# Patient Record
Sex: FEMALE | Race: OTHER | Employment: UNEMPLOYED | URBAN - METROPOLITAN AREA
[De-identification: names, ages, dates, MRNs, and addresses within clinical notes are randomized per-mention and may not be internally consistent; named-entity substitution may affect disease eponyms.]

---

## 2017-09-21 ENCOUNTER — OFFICE VISIT (OUTPATIENT)
Dept: SURGERY | Age: 42
End: 2017-09-21

## 2017-09-21 VITALS — DIASTOLIC BLOOD PRESSURE: 80 MMHG | WEIGHT: 211 LBS | TEMPERATURE: 98.2 F | SYSTOLIC BLOOD PRESSURE: 121 MMHG

## 2017-09-21 DIAGNOSIS — K35.80 ACUTE APPENDICITIS, UNSPECIFIED ACUTE APPENDICITIS TYPE: Primary | ICD-10-CM

## 2017-09-21 PROCEDURE — 99024 POSTOP FOLLOW-UP VISIT: CPT | Performed by: SURGERY

## 2018-07-09 ENCOUNTER — APPOINTMENT (OUTPATIENT)
Dept: CT IMAGING | Age: 43
DRG: 098 | End: 2018-07-09
Payer: COMMERCIAL

## 2018-07-09 ENCOUNTER — HOSPITAL ENCOUNTER (INPATIENT)
Age: 43
LOS: 11 days | Discharge: HOME HEALTH | DRG: 098 | End: 2018-07-21
Attending: EMERGENCY MEDICINE | Admitting: INTERNAL MEDICINE
Payer: COMMERCIAL

## 2018-07-09 DIAGNOSIS — K04.7 DENTAL ABSCESS: Primary | ICD-10-CM

## 2018-07-09 DIAGNOSIS — L02.01 ABSCESS OF CHIN: ICD-10-CM

## 2018-07-09 DIAGNOSIS — R51.9 FACIAL PAIN, ACUTE: ICD-10-CM

## 2018-07-09 DIAGNOSIS — D50.8 OTHER IRON DEFICIENCY ANEMIA: ICD-10-CM

## 2018-07-09 DIAGNOSIS — L03.211 FACIAL CELLULITIS: ICD-10-CM

## 2018-07-09 LAB
ALBUMIN SERPL-MCNC: 3.9 G/DL (ref 3.9–4.9)
ALP BLD-CCNC: 103 U/L (ref 40–130)
ALT SERPL-CCNC: 16 U/L (ref 0–33)
ANION GAP SERPL CALCULATED.3IONS-SCNC: 11 MEQ/L (ref 7–13)
ANISOCYTOSIS: ABNORMAL
AST SERPL-CCNC: 16 U/L (ref 0–35)
BASOPHILS ABSOLUTE: 0 K/UL (ref 0–0.2)
BASOPHILS RELATIVE PERCENT: 0.2 %
BILIRUB SERPL-MCNC: 0.5 MG/DL (ref 0–1.2)
BUN BLDV-MCNC: 13 MG/DL (ref 6–20)
CALCIUM SERPL-MCNC: 8.4 MG/DL (ref 8.6–10.2)
CHLORIDE BLD-SCNC: 102 MEQ/L (ref 98–107)
CO2: 25 MEQ/L (ref 22–29)
CREAT SERPL-MCNC: 0.78 MG/DL (ref 0.5–0.9)
EOSINOPHILS ABSOLUTE: 0 K/UL (ref 0–0.7)
EOSINOPHILS RELATIVE PERCENT: 0.1 %
GFR AFRICAN AMERICAN: >60
GFR NON-AFRICAN AMERICAN: >60
GLOBULIN: 3.5 G/DL (ref 2.3–3.5)
GLUCOSE BLD-MCNC: 102 MG/DL (ref 74–109)
HCT VFR BLD CALC: 26.4 % (ref 37–47)
HEMOGLOBIN: 8.1 G/DL (ref 12–16)
HYPOCHROMIA: ABNORMAL
LACTIC ACID: 0.9 MMOL/L (ref 0.5–2.2)
LYMPHOCYTES ABSOLUTE: 0.3 K/UL (ref 1–4.8)
LYMPHOCYTES RELATIVE PERCENT: 5.6 %
MCH RBC QN AUTO: 18.6 PG (ref 27–31.3)
MCHC RBC AUTO-ENTMCNC: 30.9 % (ref 33–37)
MCV RBC AUTO: 60.3 FL (ref 82–100)
MICROCYTES: ABNORMAL
MONOCYTES ABSOLUTE: 0 K/UL (ref 0.2–0.8)
MONOCYTES RELATIVE PERCENT: 0.8 %
NEUTROPHILS ABSOLUTE: 4.4 K/UL (ref 1.4–6.5)
NEUTROPHILS RELATIVE PERCENT: 93.3 %
PDW BLD-RTO: 17.4 % (ref 11.5–14.5)
PLATELET # BLD: 284 K/UL (ref 130–400)
POTASSIUM SERPL-SCNC: 3.7 MEQ/L (ref 3.5–5.1)
RBC # BLD: 4.38 M/UL (ref 4.2–5.4)
SODIUM BLD-SCNC: 138 MEQ/L (ref 132–144)
TOTAL PROTEIN: 7.4 G/DL (ref 6.4–8.1)
WBC # BLD: 4.7 K/UL (ref 4.8–10.8)

## 2018-07-09 PROCEDURE — 2580000003 HC RX 258: Performed by: EMERGENCY MEDICINE

## 2018-07-09 PROCEDURE — 6360000002 HC RX W HCPCS: Performed by: EMERGENCY MEDICINE

## 2018-07-09 PROCEDURE — 2500000003 HC RX 250 WO HCPCS: Performed by: EMERGENCY MEDICINE

## 2018-07-09 PROCEDURE — 70487 CT MAXILLOFACIAL W/DYE: CPT

## 2018-07-09 PROCEDURE — 80053 COMPREHEN METABOLIC PANEL: CPT

## 2018-07-09 PROCEDURE — G0378 HOSPITAL OBSERVATION PER HR: HCPCS

## 2018-07-09 PROCEDURE — 96376 TX/PRO/DX INJ SAME DRUG ADON: CPT

## 2018-07-09 PROCEDURE — 6360000004 HC RX CONTRAST MEDICATION: Performed by: RADIOLOGY

## 2018-07-09 PROCEDURE — 96375 TX/PRO/DX INJ NEW DRUG ADDON: CPT

## 2018-07-09 PROCEDURE — 6370000000 HC RX 637 (ALT 250 FOR IP): Performed by: EMERGENCY MEDICINE

## 2018-07-09 PROCEDURE — 36415 COLL VENOUS BLD VENIPUNCTURE: CPT

## 2018-07-09 PROCEDURE — 85025 COMPLETE CBC W/AUTO DIFF WBC: CPT

## 2018-07-09 PROCEDURE — 99285 EMERGENCY DEPT VISIT HI MDM: CPT

## 2018-07-09 PROCEDURE — 83605 ASSAY OF LACTIC ACID: CPT

## 2018-07-09 PROCEDURE — 96365 THER/PROPH/DIAG IV INF INIT: CPT

## 2018-07-09 RX ORDER — SODIUM CHLORIDE 0.9 % (FLUSH) 0.9 %
10 SYRINGE (ML) INJECTION PRN
Status: DISCONTINUED | OUTPATIENT
Start: 2018-07-09 | End: 2018-07-21 | Stop reason: HOSPADM

## 2018-07-09 RX ORDER — FENTANYL CITRATE 50 UG/ML
50 INJECTION, SOLUTION INTRAMUSCULAR; INTRAVENOUS ONCE
Status: COMPLETED | OUTPATIENT
Start: 2018-07-09 | End: 2018-07-09

## 2018-07-09 RX ORDER — FENTANYL CITRATE 50 UG/ML
25 INJECTION, SOLUTION INTRAMUSCULAR; INTRAVENOUS ONCE
Status: COMPLETED | OUTPATIENT
Start: 2018-07-09 | End: 2018-07-09

## 2018-07-09 RX ORDER — SODIUM CHLORIDE 0.9 % (FLUSH) 0.9 %
10 SYRINGE (ML) INJECTION EVERY 12 HOURS SCHEDULED
Status: DISCONTINUED | OUTPATIENT
Start: 2018-07-10 | End: 2018-07-21 | Stop reason: HOSPADM

## 2018-07-09 RX ORDER — KETOROLAC TROMETHAMINE 30 MG/ML
30 INJECTION, SOLUTION INTRAMUSCULAR; INTRAVENOUS EVERY 6 HOURS PRN
Status: DISPENSED | OUTPATIENT
Start: 2018-07-09 | End: 2018-07-14

## 2018-07-09 RX ORDER — KETOROLAC TROMETHAMINE 30 MG/ML
30 INJECTION, SOLUTION INTRAMUSCULAR; INTRAVENOUS ONCE
Status: COMPLETED | OUTPATIENT
Start: 2018-07-09 | End: 2018-07-09

## 2018-07-09 RX ORDER — ONDANSETRON 2 MG/ML
4 INJECTION INTRAMUSCULAR; INTRAVENOUS EVERY 6 HOURS PRN
Status: DISCONTINUED | OUTPATIENT
Start: 2018-07-09 | End: 2018-07-21 | Stop reason: HOSPADM

## 2018-07-09 RX ORDER — FERROUS SULFATE 325(65) MG
325 TABLET ORAL ONCE
Status: COMPLETED | OUTPATIENT
Start: 2018-07-09 | End: 2018-07-09

## 2018-07-09 RX ORDER — ONDANSETRON 2 MG/ML
4 INJECTION INTRAMUSCULAR; INTRAVENOUS ONCE
Status: COMPLETED | OUTPATIENT
Start: 2018-07-09 | End: 2018-07-09

## 2018-07-09 RX ORDER — CLINDAMYCIN PHOSPHATE 600 MG/50ML
600 INJECTION INTRAVENOUS EVERY 8 HOURS
Status: DISCONTINUED | OUTPATIENT
Start: 2018-07-10 | End: 2018-07-14

## 2018-07-09 RX ORDER — CLINDAMYCIN PHOSPHATE 600 MG/50ML
600 INJECTION INTRAVENOUS ONCE
Status: COMPLETED | OUTPATIENT
Start: 2018-07-09 | End: 2018-07-09

## 2018-07-09 RX ORDER — 0.9 % SODIUM CHLORIDE 0.9 %
1000 INTRAVENOUS SOLUTION INTRAVENOUS ONCE
Status: COMPLETED | OUTPATIENT
Start: 2018-07-09 | End: 2018-07-09

## 2018-07-09 RX ADMIN — FERROUS SULFATE TAB 325 MG (65 MG ELEMENTAL FE) 325 MG: 325 (65 FE) TAB at 20:35

## 2018-07-09 RX ADMIN — IOPAMIDOL 100 ML: 755 INJECTION, SOLUTION INTRAVENOUS at 20:51

## 2018-07-09 RX ADMIN — ONDANSETRON 4 MG: 2 INJECTION, SOLUTION INTRAMUSCULAR; INTRAVENOUS at 16:11

## 2018-07-09 RX ADMIN — ONDANSETRON 4 MG: 2 INJECTION, SOLUTION INTRAMUSCULAR; INTRAVENOUS at 19:55

## 2018-07-09 RX ADMIN — SODIUM CHLORIDE 1000 ML: 9 INJECTION, SOLUTION INTRAVENOUS at 16:11

## 2018-07-09 RX ADMIN — HYDROMORPHONE HYDROCHLORIDE 1 MG: 1 INJECTION, SOLUTION INTRAMUSCULAR; INTRAVENOUS; SUBCUTANEOUS at 19:55

## 2018-07-09 RX ADMIN — FENTANYL CITRATE 50 MCG: 50 INJECTION, SOLUTION INTRAMUSCULAR; INTRAVENOUS at 18:20

## 2018-07-09 RX ADMIN — CLINDAMYCIN PHOSPHATE 600 MG: 12 INJECTION, SOLUTION INTRAVENOUS at 16:52

## 2018-07-09 RX ADMIN — KETOROLAC TROMETHAMINE 30 MG: 30 INJECTION, SOLUTION INTRAMUSCULAR at 16:11

## 2018-07-09 RX ADMIN — SODIUM CHLORIDE 1000 ML: 9 INJECTION, SOLUTION INTRAVENOUS at 17:14

## 2018-07-09 RX ADMIN — FENTANYL CITRATE 25 MCG: 50 INJECTION, SOLUTION INTRAMUSCULAR; INTRAVENOUS at 16:11

## 2018-07-09 ASSESSMENT — PAIN DESCRIPTION - FREQUENCY
FREQUENCY: CONTINUOUS

## 2018-07-09 ASSESSMENT — PAIN DESCRIPTION - ORIENTATION
ORIENTATION: LEFT

## 2018-07-09 ASSESSMENT — PAIN DESCRIPTION - LOCATION
LOCATION: TEETH
LOCATION: TEETH

## 2018-07-09 ASSESSMENT — PAIN DESCRIPTION - PAIN TYPE
TYPE: ACUTE PAIN

## 2018-07-09 ASSESSMENT — PAIN SCALES - GENERAL
PAINLEVEL_OUTOF10: 10
PAINLEVEL_OUTOF10: 7
PAINLEVEL_OUTOF10: 10
PAINLEVEL_OUTOF10: 10

## 2018-07-09 ASSESSMENT — PAIN DESCRIPTION - PROGRESSION
CLINICAL_PROGRESSION: NOT CHANGED

## 2018-07-09 ASSESSMENT — PAIN DESCRIPTION - DESCRIPTORS
DESCRIPTORS: THROBBING
DESCRIPTORS: ACHING;THROBBING
DESCRIPTORS: THROBBING
DESCRIPTORS: THROBBING

## 2018-07-09 ASSESSMENT — PAIN DESCRIPTION - ONSET
ONSET: ON-GOING

## 2018-07-09 ASSESSMENT — ENCOUNTER SYMPTOMS
ABDOMINAL PAIN: 0
VOMITING: 1
EYE PAIN: 0
FACIAL SWELLING: 1
SHORTNESS OF BREATH: 0
NAUSEA: 1
CHEST TIGHTNESS: 0
SORE THROAT: 0

## 2018-07-09 ASSESSMENT — PAIN SCALES - WONG BAKER
WONGBAKER_NUMERICALRESPONSE: 2
WONGBAKER_NUMERICALRESPONSE: 2

## 2018-07-09 NOTE — ED PROVIDER NOTES
past medical history. SURGICAL HISTORY     History reviewed. No pertinent surgical history. CURRENT MEDICATIONS       There are no discharge medications for this patient. ALLERGIES     Patient has no known allergies. FAMILY HISTORY     History reviewed. No pertinent family history. SOCIAL HISTORY       Social History     Social History    Marital status: Single     Spouse name: N/A    Number of children: N/A    Years of education: N/A     Social History Main Topics    Smoking status: Never Smoker    Smokeless tobacco: Never Used    Alcohol use No    Drug use: No    Sexual activity: Not Asked     Other Topics Concern    None     Social History Narrative    None       SCREENINGS             PHYSICAL EXAM    (up to 7 for level 4, 8 or more for level 5)     ED Triage Vitals [07/09/18 1529]   BP Temp Temp Source Pulse Resp SpO2 Height Weight   126/89 100.4 °F (38 °C) Oral 131 22 97 % 5' 3\" (1.6 m) 194 lb (88 kg)       Physical Exam   Constitutional: She is oriented to person, place, and time. She appears well-developed and well-nourished. She appears distressed. HENT:   Head: Normocephalic and atraumatic. Right Ear: External ear normal.   Left Ear: External ear normal.   Mouth/Throat: No oropharyngeal exudate. Left lower alveolar ridge is swollen. Tooth 30 is tender to percussion and palpation    Sublingual tissue is not swollen. Uvula is midline. Patient is not drooling. Eyes: Conjunctivae are normal. Pupils are equal, round, and reactive to light. Neck: Normal range of motion. Neck supple. No JVD present. No tracheal deviation present. No thyromegaly present. Cardiovascular: Normal rate and normal heart sounds. No murmur heard. Pulmonary/Chest: Effort normal and breath sounds normal. No respiratory distress. She has no wheezes. Abdominal: Soft. Bowel sounds are normal. There is no tenderness. There is no guarding. Musculoskeletal: Normal range of motion.  She PLASMA       All other labs were within normal range or not returned as of this dictation. EMERGENCY DEPARTMENT COURSE and DIFFERENTIAL DIAGNOSIS/MDM:   Vitals:    Vitals:    07/09/18 1605 07/09/18 1820 07/09/18 1926 07/09/18 2032   BP: 129/81 115/71 115/74    Pulse: 128 128 125 122   Resp: 20 20 20 15   Temp:   99.8 °F (37.7 °C)    TempSrc:   Oral    SpO2: 97% 97% 98% 95%   Weight:       Height:               MDM patient with persistent tachycardia rate approximately 125 despite fluid bolus. Temperature was improved. Pain was still an issue but did not seem to be a level of causing her elevated heart rate. She also has chronic anemia. Patient is to be taking iron and she has not been on that for a while. My recommendation is to have her admitted for continued IV fluids, IV antibiotics and follow her vital signs still improvement. She also needs her iron deficiency anemia addressed      REASSESSMENT              CONSULTS:  None    PROCEDURES:  Unless otherwise noted below, none     Procedures    FINAL IMPRESSION      1. Dental abscess    2. Other iron deficiency anemia          DISPOSITION/PLAN   DISPOSITION Decision To Admit 07/09/2018 10:39:58 PM      PATIENT REFERRED TO:  Bernard Cosme, [address]  [id number]  [place] [id number] [phone]            DISCHARGE MEDICATIONS:  There are no discharge medications for this patient.          (Please note that portions of this note were completed with a voice recognition program.  Efforts were made to edit the dictations but occasionally words are mis-transcribed.)    Dago Lam MD (electronically signed)  Attending Emergency Physician          Dago Lam MD  07/09/18 8610       Dago Lam MD  07/09/18 5378

## 2018-07-09 NOTE — Clinical Note
Patient Class: Observation [104]   REQUIRED: Diagnosis: Dental abscess [740395]   Estimated Length of Stay: Estimated stay of less than 2 midnights   Future Attending Provider: Kenia Cee [[id number]]   Admitting Provider: Kenia Cee [[id number]]   Telemetry Bed Required?: Yes

## 2018-07-10 LAB
ABO/RH: NORMAL
ANION GAP SERPL CALCULATED.3IONS-SCNC: 10 MEQ/L (ref 7–13)
ANISOCYTOSIS: ABNORMAL
ANTIBODY SCREEN: NORMAL
BASOPHILS ABSOLUTE: 0 K/UL (ref 0–0.2)
BASOPHILS RELATIVE PERCENT: 0.4 %
BLOOD BANK DISPENSE STATUS: NORMAL
BLOOD BANK DISPENSE STATUS: NORMAL
BLOOD BANK PRODUCT CODE: NORMAL
BLOOD BANK PRODUCT CODE: NORMAL
BPU ID: NORMAL
BPU ID: NORMAL
BUN BLDV-MCNC: 9 MG/DL (ref 6–20)
C-REACTIVE PROTEIN, HIGH SENSITIVITY: 146.1 MG/L (ref 0–5)
CALCIUM SERPL-MCNC: 7.9 MG/DL (ref 8.6–10.2)
CHLORIDE BLD-SCNC: 104 MEQ/L (ref 98–107)
CO2: 25 MEQ/L (ref 22–29)
CREAT SERPL-MCNC: 0.63 MG/DL (ref 0.5–0.9)
DESCRIPTION BLOOD BANK: NORMAL
DESCRIPTION BLOOD BANK: NORMAL
EOSINOPHILS ABSOLUTE: 0 K/UL (ref 0–0.7)
EOSINOPHILS RELATIVE PERCENT: 0.1 %
GFR AFRICAN AMERICAN: >60
GFR NON-AFRICAN AMERICAN: >60
GLUCOSE BLD-MCNC: 104 MG/DL (ref 74–109)
HCT VFR BLD CALC: 22.4 % (ref 37–47)
HEMOGLOBIN: 6.6 G/DL (ref 12–16)
HYPOCHROMIA: ABNORMAL
LYMPHOCYTES ABSOLUTE: 0.2 K/UL (ref 1–4.8)
LYMPHOCYTES RELATIVE PERCENT: 1.9 %
MCH RBC QN AUTO: 18 PG (ref 27–31.3)
MCHC RBC AUTO-ENTMCNC: 29.6 % (ref 33–37)
MCV RBC AUTO: 61 FL (ref 82–100)
MICROCYTES: ABNORMAL
MONOCYTES ABSOLUTE: 0.7 K/UL (ref 0.2–0.8)
MONOCYTES RELATIVE PERCENT: 5.2 %
NEUTROPHILS ABSOLUTE: 11.5 K/UL (ref 1.4–6.5)
NEUTROPHILS RELATIVE PERCENT: 92.4 %
PDW BLD-RTO: 17.4 % (ref 11.5–14.5)
PLATELET # BLD: 223 K/UL (ref 130–400)
POTASSIUM REFLEX MAGNESIUM: 3.6 MEQ/L (ref 3.5–5.1)
RBC # BLD: 3.68 M/UL (ref 4.2–5.4)
SODIUM BLD-SCNC: 139 MEQ/L (ref 132–144)
WBC # BLD: 12.5 K/UL (ref 4.8–10.8)

## 2018-07-10 PROCEDURE — 86850 RBC ANTIBODY SCREEN: CPT

## 2018-07-10 PROCEDURE — 6360000002 HC RX W HCPCS: Performed by: NURSE PRACTITIONER

## 2018-07-10 PROCEDURE — 6360000002 HC RX W HCPCS: Performed by: INTERNAL MEDICINE

## 2018-07-10 PROCEDURE — 2500000003 HC RX 250 WO HCPCS: Performed by: NURSE PRACTITIONER

## 2018-07-10 PROCEDURE — 2580000003 HC RX 258: Performed by: INTERNAL MEDICINE

## 2018-07-10 PROCEDURE — 2580000003 HC RX 258: Performed by: NURSE PRACTITIONER

## 2018-07-10 PROCEDURE — 6370000000 HC RX 637 (ALT 250 FOR IP): Performed by: PHYSICIAN ASSISTANT

## 2018-07-10 PROCEDURE — 99254 IP/OBS CNSLTJ NEW/EST MOD 60: CPT | Performed by: INTERNAL MEDICINE

## 2018-07-10 PROCEDURE — P9016 RBC LEUKOCYTES REDUCED: HCPCS

## 2018-07-10 PROCEDURE — 96376 TX/PRO/DX INJ SAME DRUG ADON: CPT

## 2018-07-10 PROCEDURE — 87040 BLOOD CULTURE FOR BACTERIA: CPT

## 2018-07-10 PROCEDURE — 85025 COMPLETE CBC W/AUTO DIFF WBC: CPT

## 2018-07-10 PROCEDURE — 80048 BASIC METABOLIC PNL TOTAL CA: CPT

## 2018-07-10 PROCEDURE — 86900 BLOOD TYPING SEROLOGIC ABO: CPT

## 2018-07-10 PROCEDURE — 36415 COLL VENOUS BLD VENIPUNCTURE: CPT

## 2018-07-10 PROCEDURE — 86920 COMPATIBILITY TEST SPIN: CPT

## 2018-07-10 PROCEDURE — 96375 TX/PRO/DX INJ NEW DRUG ADDON: CPT

## 2018-07-10 PROCEDURE — 84145 PROCALCITONIN (PCT): CPT

## 2018-07-10 PROCEDURE — 1210000000 HC MED SURG R&B

## 2018-07-10 PROCEDURE — 86901 BLOOD TYPING SEROLOGIC RH(D): CPT

## 2018-07-10 PROCEDURE — 86141 C-REACTIVE PROTEIN HS: CPT

## 2018-07-10 RX ORDER — ACETAMINOPHEN 325 MG/1
650 TABLET ORAL EVERY 4 HOURS PRN
Status: DISCONTINUED | OUTPATIENT
Start: 2018-07-10 | End: 2018-07-21 | Stop reason: HOSPADM

## 2018-07-10 RX ORDER — MORPHINE SULFATE 2 MG/ML
2 INJECTION, SOLUTION INTRAMUSCULAR; INTRAVENOUS EVERY 4 HOURS PRN
Status: DISCONTINUED | OUTPATIENT
Start: 2018-07-10 | End: 2018-07-11

## 2018-07-10 RX ORDER — 0.9 % SODIUM CHLORIDE 0.9 %
250 INTRAVENOUS SOLUTION INTRAVENOUS ONCE
Status: COMPLETED | OUTPATIENT
Start: 2018-07-10 | End: 2018-07-11

## 2018-07-10 RX ORDER — SODIUM CHLORIDE 9 MG/ML
INJECTION, SOLUTION INTRAVENOUS CONTINUOUS
Status: DISCONTINUED | OUTPATIENT
Start: 2018-07-10 | End: 2018-07-12

## 2018-07-10 RX ORDER — RANITIDINE 150 MG/1
150 TABLET ORAL DAILY
COMMUNITY
End: 2022-04-14

## 2018-07-10 RX ADMIN — Medication 10 ML: at 08:24

## 2018-07-10 RX ADMIN — KETOROLAC TROMETHAMINE 30 MG: 30 INJECTION, SOLUTION INTRAMUSCULAR; INTRAVENOUS at 16:00

## 2018-07-10 RX ADMIN — Medication 10 ML: at 21:25

## 2018-07-10 RX ADMIN — ONDANSETRON HYDROCHLORIDE 4 MG: 2 INJECTION, SOLUTION INTRAMUSCULAR; INTRAVENOUS at 12:18

## 2018-07-10 RX ADMIN — CLINDAMYCIN IN 5 PERCENT DEXTROSE 600 MG: 12 INJECTION, SOLUTION INTRAVENOUS at 08:23

## 2018-07-10 RX ADMIN — SODIUM CHLORIDE: 9 INJECTION, SOLUTION INTRAVENOUS at 12:14

## 2018-07-10 RX ADMIN — SODIUM CHLORIDE: 9 INJECTION, SOLUTION INTRAVENOUS at 22:03

## 2018-07-10 RX ADMIN — KETOROLAC TROMETHAMINE 30 MG: 30 INJECTION, SOLUTION INTRAMUSCULAR; INTRAVENOUS at 23:53

## 2018-07-10 RX ADMIN — ONDANSETRON HYDROCHLORIDE 4 MG: 2 INJECTION, SOLUTION INTRAMUSCULAR; INTRAVENOUS at 04:49

## 2018-07-10 RX ADMIN — ONDANSETRON HYDROCHLORIDE 4 MG: 2 INJECTION, SOLUTION INTRAMUSCULAR; INTRAVENOUS at 20:06

## 2018-07-10 RX ADMIN — SODIUM CHLORIDE 250 ML: 9 INJECTION, SOLUTION INTRAVENOUS at 16:01

## 2018-07-10 RX ADMIN — MORPHINE SULFATE 2 MG: 2 INJECTION, SOLUTION INTRAMUSCULAR; INTRAVENOUS at 21:24

## 2018-07-10 RX ADMIN — CLINDAMYCIN IN 5 PERCENT DEXTROSE 600 MG: 12 INJECTION, SOLUTION INTRAVENOUS at 17:18

## 2018-07-10 RX ADMIN — ACETAMINOPHEN 650 MG: 325 TABLET ORAL at 16:13

## 2018-07-10 RX ADMIN — CLINDAMYCIN IN 5 PERCENT DEXTROSE 600 MG: 12 INJECTION, SOLUTION INTRAVENOUS at 00:24

## 2018-07-10 RX ADMIN — KETOROLAC TROMETHAMINE 30 MG: 30 INJECTION, SOLUTION INTRAMUSCULAR; INTRAVENOUS at 07:25

## 2018-07-10 RX ADMIN — KETOROLAC TROMETHAMINE 30 MG: 30 INJECTION, SOLUTION INTRAMUSCULAR; INTRAVENOUS at 00:23

## 2018-07-10 RX ADMIN — MORPHINE SULFATE 2 MG: 2 INJECTION, SOLUTION INTRAMUSCULAR; INTRAVENOUS at 12:14

## 2018-07-10 RX ADMIN — MORPHINE SULFATE 2 MG: 2 INJECTION, SOLUTION INTRAMUSCULAR; INTRAVENOUS at 17:18

## 2018-07-10 ASSESSMENT — PAIN DESCRIPTION - PAIN TYPE
TYPE: ACUTE PAIN
TYPE: ACUTE PAIN

## 2018-07-10 ASSESSMENT — PAIN SCALES - GENERAL
PAINLEVEL_OUTOF10: 9
PAINLEVEL_OUTOF10: 10
PAINLEVEL_OUTOF10: 8
PAINLEVEL_OUTOF10: 9
PAINLEVEL_OUTOF10: 10
PAINLEVEL_OUTOF10: 7

## 2018-07-10 ASSESSMENT — PAIN DESCRIPTION - FREQUENCY
FREQUENCY: CONTINUOUS
FREQUENCY: CONTINUOUS

## 2018-07-10 ASSESSMENT — PAIN DESCRIPTION - ONSET
ONSET: ON-GOING
ONSET: ON-GOING

## 2018-07-10 ASSESSMENT — PAIN DESCRIPTION - ORIENTATION
ORIENTATION: LEFT
ORIENTATION: LEFT

## 2018-07-10 ASSESSMENT — PAIN DESCRIPTION - LOCATION
LOCATION: TEETH;FACE
LOCATION: TEETH;FACE

## 2018-07-10 NOTE — H&P
molar with swelling in jaw  Neck: Supple, with full range of motion. No jugular venous distention. Trachea midline. Respiratory:  Normal respiratory effort. Clear to auscultation, bilaterally without Rales/Wheezes/Rhonchi. Cardiovascular:  Regular rate and rhythm with normal S1/S2 without murmurs, rubs or gallops. Abdomen: Soft, non-tender, non-distended with normal bowel sounds. Musculoskeletal:  No clubbing, cyanosis or edema bilaterally. Full range of motion without deformity. Skin: Skin color, texture, turgor normal.  No rashes or lesions. Neurologic:  Neurovascularly intact without any focal sensory/motor deficits. Cranial nerves: II-XII intact, grossly non-focal.  Psychiatric:  Alert and oriented, thought content appropriate, normal insight  Capillary Refill: Brisk,< 3 seconds   Peripheral Pulses: +2 palpable, equal bilaterally       Labs:     Recent Labs      07/09/18   1600   WBC  4.7*   HGB  8.1*   HCT  26.4*   PLT  284     Recent Labs      07/09/18   1600   NA  138   K  3.7   CL  102   CO2  25   BUN  13   CREATININE  0.78   CALCIUM  8.4*     Recent Labs      07/09/18   1600   AST  16   ALT  16   BILITOT  0.5   ALKPHOS  103     No results for input(s): INR in the last 72 hours. No results for input(s): Dell Meager in the last 72 hours.     Urinalysis:    No results found for: Lena Rosado, BACTERIA, [address] [facility], [facility], [facility] 994    Radiology:     CXR: I have reviewed the CXR with the following interpretation:   EKG:  I have reviewed the EKG with the following interpretation:     CT MAXILLOFACIAL W CONTRAST    (Results Pending)       ASSESSMENT/PLAN:  Dental Abscess/fever : continue IV clindamycin and fluid, encouraged to follow up outpt with dentist, pain control , trend temp and WBC   Anemia: chronic, will monitor cbc     DVT Prophylaxis:Lovenox  Diet:  dental soft   Code Status: full       Dispo - observation        Ema Landaverde, APRN - CNP    Thank you Merlene Bentley MD for the

## 2018-07-10 NOTE — PROGRESS NOTES
Dr. Hawa Cooper ENT called and stated that since abscess is from dental origin oral surgeon would need to be consulted if primary wants to do so.   Electronically signed by Leeroy Contreras RN on 7/10/2018 at 3:24 PM

## 2018-07-10 NOTE — PROGRESS NOTES
Hospitalist Progress Note      PCP: Valerie Faulkner MD    Date of Admission: 7/9/2018    Chief Complaint:  Patient seen and examined, still having severe dental and jaw pain    Medications:  Reviewed    Infusion Medications    sodium chloride       Scheduled Medications    sodium chloride flush  10 mL Intravenous 2 times per day    enoxaparin  40 mg Subcutaneous Daily    clindamycin (CLEOCIN) IV  600 mg Intravenous Q8H     PRN Meds: morphine, sodium chloride flush, magnesium hydroxide, ondansetron, ketorolac    No intake or output data in the 24 hours ending 07/10/18 1113    Exam:    /75   Pulse 108   Temp 97.7 °F (36.5 °C) (Oral)   Resp 18   Ht 5' 3\" (1.6 m)   Wt 194 lb 14.2 oz (88.4 kg)   LMP 07/05/2018   SpO2 98%   BMI 34.52 kg/m²     General appearance: No apparent distress, appears stated age and cooperative. HEENT: has swelling of the left mandible. Neck: Supple, with full range of motion. No jugular venous distention. Trachea midline. Respiratory:  Normal respiratory effort. Clear to auscultation, bilaterally without Rales/Wheezes/Rhonchi. Cardiovascular: Regular rate and rhythm with normal S1/S2 without murmurs, rubs or gallops. Abdomen: Soft, non-tender, non-distended with normal bowel sounds. Musculoskeletal: No clubbing, cyanosis or edema bilaterally. Full range of motion without deformity. Skin: Skin color, texture, turgor normal.  No rashes or lesions. Neuro: Non Focal. Symetrical motor and tone. Nl Comprehension, Alert,awake and oriented. NL CN. Symetrical tone and reflexes.   Psychiatric: Alert and oriented, thought content appropriate, normal insight  Capillary Refill: Brisk,< 3 seconds   Peripheral Pulses: +2 palpable, equal bilaterally       Labs:   Recent Labs      07/09/18   1600  07/10/18   0541   WBC  4.7*  12.5*   HGB  8.1*  6.6*   HCT  26.4*  22.4*   PLT  284  223     Recent Labs      07/09/18   1600  07/10/18   0541   NA  138  139   K  3.7  3.6   CL  102  104

## 2018-07-10 NOTE — ED NOTES
Report called to [facility] charge RN [phone] [place]  07/09/18 6040

## 2018-07-10 NOTE — CARE COORDINATION
MET WITH PATIENT TO DISCUSS D/C PLANS. SHE IS FROM HOME WITH HER CHILDREN PRESENTLY. SHE PLANS TO RETURN HOME. PATIENT WITH LEFT SIDE OF FACE NOTED CONSIDERABLY SWOLLEN. ORAL SURGEON TO SEE HER. SHE IS RECEIVING BLOOD CURRENTLY. CARE TEAM TO FOLLOW FOR ? IV ANTIBIOTIC NEEDS.  Electronically signed by Rodrigo Mitchell RN on 7/10/2018 at 4:27 PM

## 2018-07-10 NOTE — PROGRESS NOTES
Rakesh Camilo served Dr Alice Sinclair as Pt states she is on her period. Just got back a critical hgb of 6.6.  yesterday it was 8.1.

## 2018-07-11 LAB
ANION GAP SERPL CALCULATED.3IONS-SCNC: 12 MEQ/L (ref 7–13)
BASOPHILS ABSOLUTE: 0.1 K/UL (ref 0–0.2)
BASOPHILS RELATIVE PERCENT: 0.6 %
BUN BLDV-MCNC: 8 MG/DL (ref 6–20)
CALCIUM SERPL-MCNC: 8 MG/DL (ref 8.6–10.2)
CHLORIDE BLD-SCNC: 104 MEQ/L (ref 98–107)
CO2: 23 MEQ/L (ref 22–29)
CREAT SERPL-MCNC: 0.6 MG/DL (ref 0.5–0.9)
EOSINOPHILS ABSOLUTE: 0.1 K/UL (ref 0–0.7)
EOSINOPHILS RELATIVE PERCENT: 0.6 %
GFR AFRICAN AMERICAN: >60
GFR NON-AFRICAN AMERICAN: >60
GLUCOSE BLD-MCNC: 91 MG/DL (ref 74–109)
HCT VFR BLD CALC: 26.3 % (ref 37–47)
HEMOGLOBIN: 8.3 G/DL (ref 12–16)
LYMPHOCYTES ABSOLUTE: 0.6 K/UL (ref 1–4.8)
LYMPHOCYTES RELATIVE PERCENT: 8 %
MCH RBC QN AUTO: 20.9 PG (ref 27–31.3)
MCHC RBC AUTO-ENTMCNC: 31.5 % (ref 33–37)
MCV RBC AUTO: 66.3 FL (ref 82–100)
MONOCYTES ABSOLUTE: 0.6 K/UL (ref 0.2–0.8)
MONOCYTES RELATIVE PERCENT: 8.1 %
NEUTROPHILS ABSOLUTE: 6.7 K/UL (ref 1.4–6.5)
NEUTROPHILS RELATIVE PERCENT: 82.7 %
PDW BLD-RTO: 24 % (ref 11.5–14.5)
PLATELET # BLD: 184 K/UL (ref 130–400)
POTASSIUM SERPL-SCNC: 3.3 MEQ/L (ref 3.5–5.1)
RBC # BLD: 3.97 M/UL (ref 4.2–5.4)
SODIUM BLD-SCNC: 139 MEQ/L (ref 132–144)
WBC # BLD: 8.1 K/UL (ref 4.8–10.8)

## 2018-07-11 PROCEDURE — 99232 SBSQ HOSP IP/OBS MODERATE 35: CPT | Performed by: INTERNAL MEDICINE

## 2018-07-11 PROCEDURE — 6360000002 HC RX W HCPCS: Performed by: INTERNAL MEDICINE

## 2018-07-11 PROCEDURE — 6370000000 HC RX 637 (ALT 250 FOR IP): Performed by: INTERNAL MEDICINE

## 2018-07-11 PROCEDURE — 6360000002 HC RX W HCPCS: Performed by: NURSE PRACTITIONER

## 2018-07-11 PROCEDURE — 36415 COLL VENOUS BLD VENIPUNCTURE: CPT

## 2018-07-11 PROCEDURE — 2580000003 HC RX 258: Performed by: INTERNAL MEDICINE

## 2018-07-11 PROCEDURE — 80048 BASIC METABOLIC PNL TOTAL CA: CPT

## 2018-07-11 PROCEDURE — 2500000003 HC RX 250 WO HCPCS: Performed by: NURSE PRACTITIONER

## 2018-07-11 PROCEDURE — 1210000000 HC MED SURG R&B

## 2018-07-11 PROCEDURE — 85025 COMPLETE CBC W/AUTO DIFF WBC: CPT

## 2018-07-11 RX ORDER — MORPHINE SULFATE 2 MG/ML
2 INJECTION, SOLUTION INTRAMUSCULAR; INTRAVENOUS EVERY 4 HOURS PRN
Status: DISPENSED | OUTPATIENT
Start: 2018-07-11 | End: 2018-07-12

## 2018-07-11 RX ORDER — VANCOMYCIN HYDROCHLORIDE 1 G/200ML
1000 INJECTION, SOLUTION INTRAVENOUS EVERY 12 HOURS
Status: DISCONTINUED | OUTPATIENT
Start: 2018-07-11 | End: 2018-07-17

## 2018-07-11 RX ORDER — OXYCODONE HYDROCHLORIDE AND ACETAMINOPHEN 5; 325 MG/1; MG/1
1 TABLET ORAL EVERY 4 HOURS PRN
Status: DISCONTINUED | OUTPATIENT
Start: 2018-07-11 | End: 2018-07-15

## 2018-07-11 RX ORDER — POTASSIUM CHLORIDE 1.5 G/1.77G
30 POWDER, FOR SOLUTION ORAL ONCE
Status: COMPLETED | OUTPATIENT
Start: 2018-07-11 | End: 2018-07-11

## 2018-07-11 RX ORDER — POTASSIUM CHLORIDE 20 MEQ/1
40 TABLET, EXTENDED RELEASE ORAL ONCE
Status: COMPLETED | OUTPATIENT
Start: 2018-07-11 | End: 2018-07-11

## 2018-07-11 RX ADMIN — VANCOMYCIN HYDROCHLORIDE 1000 MG: 1 INJECTION, SOLUTION INTRAVENOUS at 14:34

## 2018-07-11 RX ADMIN — KETOROLAC TROMETHAMINE 30 MG: 30 INJECTION, SOLUTION INTRAMUSCULAR; INTRAVENOUS at 22:48

## 2018-07-11 RX ADMIN — CLINDAMYCIN IN 5 PERCENT DEXTROSE 600 MG: 12 INJECTION, SOLUTION INTRAVENOUS at 09:07

## 2018-07-11 RX ADMIN — ONDANSETRON HYDROCHLORIDE 4 MG: 2 INJECTION, SOLUTION INTRAMUSCULAR; INTRAVENOUS at 21:20

## 2018-07-11 RX ADMIN — MORPHINE SULFATE 2 MG: 2 INJECTION, SOLUTION INTRAMUSCULAR; INTRAVENOUS at 09:07

## 2018-07-11 RX ADMIN — CLINDAMYCIN IN 5 PERCENT DEXTROSE 600 MG: 12 INJECTION, SOLUTION INTRAVENOUS at 16:53

## 2018-07-11 RX ADMIN — VANCOMYCIN HYDROCHLORIDE 1000 MG: 1 INJECTION, SOLUTION INTRAVENOUS at 01:48

## 2018-07-11 RX ADMIN — KETOROLAC TROMETHAMINE 30 MG: 30 INJECTION, SOLUTION INTRAMUSCULAR; INTRAVENOUS at 10:59

## 2018-07-11 RX ADMIN — ONDANSETRON HYDROCHLORIDE 4 MG: 2 INJECTION, SOLUTION INTRAMUSCULAR; INTRAVENOUS at 04:04

## 2018-07-11 RX ADMIN — OXYCODONE HYDROCHLORIDE AND ACETAMINOPHEN 1 TABLET: 5; 325 TABLET ORAL at 20:03

## 2018-07-11 RX ADMIN — SODIUM CHLORIDE: 9 INJECTION, SOLUTION INTRAVENOUS at 21:19

## 2018-07-11 RX ADMIN — MORPHINE SULFATE 2 MG: 2 INJECTION, SOLUTION INTRAMUSCULAR; INTRAVENOUS at 04:04

## 2018-07-11 RX ADMIN — OXYCODONE HYDROCHLORIDE AND ACETAMINOPHEN 1 TABLET: 5; 325 TABLET ORAL at 10:59

## 2018-07-11 RX ADMIN — MORPHINE SULFATE 2 MG: 2 INJECTION, SOLUTION INTRAMUSCULAR; INTRAVENOUS at 18:39

## 2018-07-11 RX ADMIN — CLINDAMYCIN IN 5 PERCENT DEXTROSE 600 MG: 12 INJECTION, SOLUTION INTRAVENOUS at 01:11

## 2018-07-11 RX ADMIN — POTASSIUM CHLORIDE 30 MEQ: 1.5 POWDER, FOR SOLUTION ORAL at 09:07

## 2018-07-11 RX ADMIN — POTASSIUM CHLORIDE 40 MEQ: 20 TABLET, EXTENDED RELEASE ORAL at 10:59

## 2018-07-11 RX ADMIN — MORPHINE SULFATE 2 MG: 2 INJECTION, SOLUTION INTRAMUSCULAR; INTRAVENOUS at 14:34

## 2018-07-11 ASSESSMENT — PAIN SCALES - GENERAL
PAINLEVEL_OUTOF10: 10
PAINLEVEL_OUTOF10: 9
PAINLEVEL_OUTOF10: 10
PAINLEVEL_OUTOF10: 6
PAINLEVEL_OUTOF10: 7
PAINLEVEL_OUTOF10: 9
PAINLEVEL_OUTOF10: 10

## 2018-07-11 ASSESSMENT — PAIN DESCRIPTION - PROGRESSION
CLINICAL_PROGRESSION: NOT CHANGED

## 2018-07-11 ASSESSMENT — PAIN DESCRIPTION - ORIENTATION: ORIENTATION: LEFT;DISTAL

## 2018-07-11 ASSESSMENT — PAIN DESCRIPTION - DESCRIPTORS: DESCRIPTORS: ACHING;CRUSHING;DISCOMFORT;DULL

## 2018-07-11 ASSESSMENT — PAIN DESCRIPTION - LOCATION
LOCATION: TEETH;FACE
LOCATION: FACE;JAW;TEETH

## 2018-07-11 ASSESSMENT — PAIN DESCRIPTION - PAIN TYPE
TYPE: ACUTE PAIN
TYPE: ACUTE PAIN

## 2018-07-11 ASSESSMENT — PAIN DESCRIPTION - FREQUENCY: FREQUENCY: CONTINUOUS

## 2018-07-11 ASSESSMENT — PAIN DESCRIPTION - ONSET: ONSET: GRADUAL

## 2018-07-11 NOTE — FLOWSHEET NOTE
Pt finidhed 2nd unit of blood, tolerated well. Will recheck Hgb in AM with 0600 labs. Pt's left side of face is swollen and painful. Pt getting pain medication as ordered and ice. Pt swishing and spitting, stating there is a bad taste of pus in her mouth. Prior to blood transfusion, pt had low grade oral temp of 99.7 at 2345, 650 mg of po tylenol given. O2 98% RA, will continue to monitor.

## 2018-07-11 NOTE — PROGRESS NOTES
PHARMACY NOTE:  Drug Shortage Communication and Action Plan: IV Opioids    Vishnu Swartz was ordered morphine 2 mg iv q4h prn.  Per the [address], the appropriate stop time was implemented per pharmacy upon order verification based upon the below parameters due to recent drug shortage    IV Opioid Stop Time:  Patient with a diet 24 hour stop time X   Patient NPO 72 hour stop time

## 2018-07-11 NOTE — PROGRESS NOTES
Infectious Disease Progress Note    Patient informed that I am an ID physician on entry and permission obtained from the patient to speak in front of any visitors prior to any discussion for HIPPA purposes. 7/11/2018    Patient is a hospital followup regarding L dental abscess with facial cellulitis - still awaiting oral surgeon consult. L face is still very swollen although she is able to open her mouth a little more. +pain    General: Patient in no acute distress, cooperative  L face swollen and painful  Skin: no new rashes   HEENT: EOMI, MMM, Neck is supple  Heart: S1 S2  Lungs:bilaterally clear  Abdomen: soft, ND, +BS, NTTP  Extrem:+pulses, no calf pain  Neuro exam: CN II-XII intact      Lab Results   Component Value Date    WBC 8.1 07/11/2018    HGB 8.3 (L) 07/11/2018    HCT 26.3 (L) 07/11/2018    MCV 66.3 (L) 07/11/2018     07/11/2018     Lab Results   Component Value Date     07/11/2018    K 3.3 07/11/2018    K 3.6 07/10/2018     07/11/2018    CO2 23 07/11/2018    BUN 8 07/11/2018    CREATININE 0.60 07/11/2018    GLUCOSE 91 07/11/2018    CALCIUM 8.0 07/11/2018        WBC trends are being monitored. Antibiotic doses are being adjusted per most recent renal labs. Vitals:    07/11/18 0834   BP: 127/85   Pulse: 89   Resp: 16   Temp: 99.3 °F (37.4 °C)   SpO2: 97%           Patient Active Problem List   Diagnosis    Dental abscess     CT:  THERE IS DIFFUSE SUBCUTANEOUS STRANDING, INFLAMMATORY CHANGES OVERLYING THE LEFT PERIMANDIBULAR REGION. THERE IS ASSOCIATED ABSCESS SUBJACENT TO THE LEFT MANDIBLE AND REGIONS OF TOOTH NUMBER APPROXIMATELY 19 THROUGH 22. THERE IS SOME ASSOCIATED DENTAL    CARIES. FINDINGS MOST LIKELY REPRESENT ODONTOGENIC ABSCESS IN ORIGIN. ASSESSMENT and PLAN:    From an ID standpoint, will continue treatment for L dental abscess with L face cellulitis. Needs oral surgery evaluation and opinion - waiting  Continue Vanco and Clindamycin for now.    BC

## 2018-07-11 NOTE — CONSULTS
Inpatient consult to Infectious Diseases  Consult performed by: Mary Mcwilliams  Consult ordered by: Lily Cage, [address] [facility]  1975  female  Medical Record Number: [id number]    Patient informed that I am an Infectious Disease physician and permission obtained from the patient to speak in front of any visitors prior to any discussion for HIPPA purposes. HPI: 37 y.o. female with dental abscess L lower jaw. Oral surgery consult is pending. +broken tooth, now with sharp pain and vomiting; sometimes fevers   + dental caries  Scheduled for extraction July 16 but couldn't wait as she was feeling miserable. Took 5 days of Amoxicillin last month. Review of Systems: All 14 review of systems were discussed with the patient and are negative other than as stated above      History reviewed. No pertinent past medical history. History reviewed. No pertinent surgical history. Social History     Social History    Marital status: Single     Spouse name: N/A    Number of children: N/A    Years of education: N/A     Occupational History    Not on file. Social History Main Topics    Smoking status: Never Smoker    Smokeless tobacco: Never Used    Alcohol use No    Drug use: No    Sexual activity: Not on file     Other Topics Concern    Not on file     Social History Narrative    No narrative on file       History reviewed. No pertinent family history. No current facility-administered medications on file prior to encounter. No current outpatient prescriptions on file prior to encounter. Physical Exam:    General: Patient appears in no acute distress, cooperative  Skin: no new rashes or erythema or induration  HEENT: EOMI, MMM, Neck is supple; L jaw swelling and pain  Heart: S1 S2  Lungs: bilaterally clear to auscultation  Abdomen: soft, ND, NTTP, +BS  Extrem:+pulses, no calf pain, no asymmetry of the upper or lower extremities  Neuro exam: CN II-XII intact    Labs:    I

## 2018-07-12 ENCOUNTER — ANESTHESIA EVENT (OUTPATIENT)
Dept: OPERATING ROOM | Age: 43
DRG: 098 | End: 2018-07-12
Payer: COMMERCIAL

## 2018-07-12 ENCOUNTER — ANESTHESIA (OUTPATIENT)
Dept: OPERATING ROOM | Age: 43
DRG: 098 | End: 2018-07-12
Payer: COMMERCIAL

## 2018-07-12 VITALS — SYSTOLIC BLOOD PRESSURE: 112 MMHG | OXYGEN SATURATION: 100 % | DIASTOLIC BLOOD PRESSURE: 83 MMHG

## 2018-07-12 LAB
ANION GAP SERPL CALCULATED.3IONS-SCNC: 8 MEQ/L (ref 7–13)
BASOPHILS ABSOLUTE: 0 K/UL (ref 0–0.2)
BASOPHILS RELATIVE PERCENT: 0.7 %
BUN BLDV-MCNC: 6 MG/DL (ref 6–20)
CALCIUM SERPL-MCNC: 7.8 MG/DL (ref 8.6–10.2)
CHLORIDE BLD-SCNC: 106 MEQ/L (ref 98–107)
CO2: 24 MEQ/L (ref 22–29)
CREAT SERPL-MCNC: 0.55 MG/DL (ref 0.5–0.9)
EOSINOPHILS ABSOLUTE: 0.1 K/UL (ref 0–0.7)
EOSINOPHILS RELATIVE PERCENT: 1.3 %
GFR AFRICAN AMERICAN: >60
GFR NON-AFRICAN AMERICAN: >60
GLUCOSE BLD-MCNC: 112 MG/DL (ref 74–109)
HCT VFR BLD CALC: 24.9 % (ref 37–47)
HEMOGLOBIN: 7.9 G/DL (ref 12–16)
LYMPHOCYTES ABSOLUTE: 0.9 K/UL (ref 1–4.8)
LYMPHOCYTES RELATIVE PERCENT: 14.8 %
MCH RBC QN AUTO: 20.9 PG (ref 27–31.3)
MCHC RBC AUTO-ENTMCNC: 31.6 % (ref 33–37)
MCV RBC AUTO: 66.2 FL (ref 82–100)
MONOCYTES ABSOLUTE: 0.6 K/UL (ref 0.2–0.8)
MONOCYTES RELATIVE PERCENT: 9.6 %
NEUTROPHILS ABSOLUTE: 4.4 K/UL (ref 1.4–6.5)
NEUTROPHILS RELATIVE PERCENT: 73.6 %
PDW BLD-RTO: 25 % (ref 11.5–14.5)
PLATELET # BLD: 195 K/UL (ref 130–400)
POTASSIUM SERPL-SCNC: 3.2 MEQ/L (ref 3.5–5.1)
PROCALCITONIN: 30.77 NG/ML
RBC # BLD: 3.77 M/UL (ref 4.2–5.4)
SODIUM BLD-SCNC: 138 MEQ/L (ref 132–144)
WBC # BLD: 6 K/UL (ref 4.8–10.8)

## 2018-07-12 PROCEDURE — 3700000001 HC ADD 15 MINUTES (ANESTHESIA): Performed by: DENTIST

## 2018-07-12 PROCEDURE — 2500000003 HC RX 250 WO HCPCS: Performed by: NURSE PRACTITIONER

## 2018-07-12 PROCEDURE — 0J950ZZ DRAINAGE OF LEFT NECK SUBCUTANEOUS TISSUE AND FASCIA, OPEN APPROACH: ICD-10-PCS | Performed by: DENTIST

## 2018-07-12 PROCEDURE — 2500000003 HC RX 250 WO HCPCS: Performed by: DENTIST

## 2018-07-12 PROCEDURE — 2580000003 HC RX 258: Performed by: INTERNAL MEDICINE

## 2018-07-12 PROCEDURE — 80048 BASIC METABOLIC PNL TOTAL CA: CPT

## 2018-07-12 PROCEDURE — 3700000000 HC ANESTHESIA ATTENDED CARE: Performed by: DENTIST

## 2018-07-12 PROCEDURE — 36415 COLL VENOUS BLD VENIPUNCTURE: CPT

## 2018-07-12 PROCEDURE — 87185 SC STD ENZYME DETCJ PER NZM: CPT

## 2018-07-12 PROCEDURE — 2580000003 HC RX 258: Performed by: NURSE PRACTITIONER

## 2018-07-12 PROCEDURE — A6402 STERILE GAUZE <= 16 SQ IN: HCPCS | Performed by: DENTIST

## 2018-07-12 PROCEDURE — 2500000003 HC RX 250 WO HCPCS: Performed by: NURSE ANESTHETIST, CERTIFIED REGISTERED

## 2018-07-12 PROCEDURE — 99232 SBSQ HOSP IP/OBS MODERATE 35: CPT | Performed by: INTERNAL MEDICINE

## 2018-07-12 PROCEDURE — 2580000003 HC RX 258: Performed by: NURSE ANESTHETIST, CERTIFIED REGISTERED

## 2018-07-12 PROCEDURE — 6370000000 HC RX 637 (ALT 250 FOR IP): Performed by: INTERNAL MEDICINE

## 2018-07-12 PROCEDURE — 6360000002 HC RX W HCPCS: Performed by: INTERNAL MEDICINE

## 2018-07-12 PROCEDURE — 1210000000 HC MED SURG R&B

## 2018-07-12 PROCEDURE — 6360000002 HC RX W HCPCS: Performed by: NURSE PRACTITIONER

## 2018-07-12 PROCEDURE — 3600000002 HC SURGERY LEVEL 2 BASE: Performed by: DENTIST

## 2018-07-12 PROCEDURE — 6360000002 HC RX W HCPCS: Performed by: ANESTHESIOLOGY

## 2018-07-12 PROCEDURE — 7100000001 HC PACU RECOVERY - ADDTL 15 MIN: Performed by: DENTIST

## 2018-07-12 PROCEDURE — 87070 CULTURE OTHR SPECIMN AEROBIC: CPT

## 2018-07-12 PROCEDURE — 7100000000 HC PACU RECOVERY - FIRST 15 MIN: Performed by: DENTIST

## 2018-07-12 PROCEDURE — 85025 COMPLETE CBC W/AUTO DIFF WBC: CPT

## 2018-07-12 PROCEDURE — 3600000012 HC SURGERY LEVEL 2 ADDTL 15MIN: Performed by: DENTIST

## 2018-07-12 PROCEDURE — 0CDXXZ0 EXTRACTION OF LOWER TOOTH, SINGLE, EXTERNAL APPROACH: ICD-10-PCS | Performed by: DENTIST

## 2018-07-12 PROCEDURE — 87075 CULTR BACTERIA EXCEPT BLOOD: CPT

## 2018-07-12 PROCEDURE — 87205 SMEAR GRAM STAIN: CPT

## 2018-07-12 PROCEDURE — 6360000002 HC RX W HCPCS: Performed by: NURSE ANESTHETIST, CERTIFIED REGISTERED

## 2018-07-12 RX ORDER — METOCLOPRAMIDE HYDROCHLORIDE 5 MG/ML
10 INJECTION INTRAMUSCULAR; INTRAVENOUS
Status: DISCONTINUED | OUTPATIENT
Start: 2018-07-12 | End: 2018-07-12 | Stop reason: HOSPADM

## 2018-07-12 RX ORDER — POTASSIUM CHLORIDE 20 MEQ/1
40 TABLET, EXTENDED RELEASE ORAL 2 TIMES DAILY WITH MEALS
Status: ACTIVE | OUTPATIENT
Start: 2018-07-12 | End: 2018-07-13

## 2018-07-12 RX ORDER — MIDAZOLAM HYDROCHLORIDE 1 MG/ML
INJECTION INTRAMUSCULAR; INTRAVENOUS PRN
Status: DISCONTINUED | OUTPATIENT
Start: 2018-07-12 | End: 2018-07-12 | Stop reason: SDUPTHER

## 2018-07-12 RX ORDER — ONDANSETRON 2 MG/ML
4 INJECTION INTRAMUSCULAR; INTRAVENOUS
Status: DISCONTINUED | OUTPATIENT
Start: 2018-07-12 | End: 2018-07-12 | Stop reason: HOSPADM

## 2018-07-12 RX ORDER — CLINDAMYCIN PHOSPHATE 600 MG/50ML
INJECTION INTRAVENOUS CONTINUOUS PRN
Status: COMPLETED | OUTPATIENT
Start: 2018-07-12 | End: 2018-07-12

## 2018-07-12 RX ORDER — SODIUM CHLORIDE 9 MG/ML
INJECTION, SOLUTION INTRAVENOUS CONTINUOUS PRN
Status: DISCONTINUED | OUTPATIENT
Start: 2018-07-12 | End: 2018-07-12 | Stop reason: SDUPTHER

## 2018-07-12 RX ORDER — LIDOCAINE HYDROCHLORIDE 20 MG/ML
INJECTION, SOLUTION INFILTRATION; PERINEURAL PRN
Status: DISCONTINUED | OUTPATIENT
Start: 2018-07-12 | End: 2018-07-12 | Stop reason: SDUPTHER

## 2018-07-12 RX ORDER — KETOROLAC TROMETHAMINE 30 MG/ML
INJECTION, SOLUTION INTRAMUSCULAR; INTRAVENOUS PRN
Status: DISCONTINUED | OUTPATIENT
Start: 2018-07-12 | End: 2018-07-12 | Stop reason: SDUPTHER

## 2018-07-12 RX ORDER — DIPHENHYDRAMINE HYDROCHLORIDE 50 MG/ML
12.5 INJECTION INTRAMUSCULAR; INTRAVENOUS
Status: DISCONTINUED | OUTPATIENT
Start: 2018-07-12 | End: 2018-07-12 | Stop reason: HOSPADM

## 2018-07-12 RX ORDER — FENTANYL CITRATE 50 UG/ML
INJECTION, SOLUTION INTRAMUSCULAR; INTRAVENOUS PRN
Status: DISCONTINUED | OUTPATIENT
Start: 2018-07-12 | End: 2018-07-12 | Stop reason: SDUPTHER

## 2018-07-12 RX ORDER — HYDROCODONE BITARTRATE AND ACETAMINOPHEN 5; 325 MG/1; MG/1
1 TABLET ORAL PRN
Status: DISCONTINUED | OUTPATIENT
Start: 2018-07-12 | End: 2018-07-12 | Stop reason: HOSPADM

## 2018-07-12 RX ORDER — DIMETHICONE, CAMPHOR (SYNTHETIC), MENTHOL, AND PHENOL 1.1; .5; .625; .5 G/100G; G/100G; G/100G; G/100G
OINTMENT TOPICAL
Status: DISPENSED
Start: 2018-07-12 | End: 2018-07-13

## 2018-07-12 RX ORDER — FENTANYL CITRATE 50 UG/ML
50 INJECTION, SOLUTION INTRAMUSCULAR; INTRAVENOUS EVERY 10 MIN PRN
Status: COMPLETED | OUTPATIENT
Start: 2018-07-12 | End: 2018-07-12

## 2018-07-12 RX ORDER — SUCCINYLCHOLINE/SOD CL,ISO/PF 100 MG/5ML
SYRINGE (ML) INTRAVENOUS PRN
Status: DISCONTINUED | OUTPATIENT
Start: 2018-07-12 | End: 2018-07-12 | Stop reason: SDUPTHER

## 2018-07-12 RX ORDER — PROPOFOL 10 MG/ML
INJECTION, EMULSION INTRAVENOUS PRN
Status: DISCONTINUED | OUTPATIENT
Start: 2018-07-12 | End: 2018-07-12 | Stop reason: SDUPTHER

## 2018-07-12 RX ORDER — ROCURONIUM BROMIDE 10 MG/ML
INJECTION, SOLUTION INTRAVENOUS PRN
Status: DISCONTINUED | OUTPATIENT
Start: 2018-07-12 | End: 2018-07-12 | Stop reason: SDUPTHER

## 2018-07-12 RX ORDER — ONDANSETRON 2 MG/ML
INJECTION INTRAMUSCULAR; INTRAVENOUS PRN
Status: DISCONTINUED | OUTPATIENT
Start: 2018-07-12 | End: 2018-07-12 | Stop reason: SDUPTHER

## 2018-07-12 RX ORDER — MORPHINE SULFATE 2 MG/ML
2 INJECTION, SOLUTION INTRAMUSCULAR; INTRAVENOUS EVERY 4 HOURS PRN
Status: DISPENSED | OUTPATIENT
Start: 2018-07-12 | End: 2018-07-13

## 2018-07-12 RX ORDER — MEPERIDINE HYDROCHLORIDE 25 MG/ML
12.5 INJECTION INTRAMUSCULAR; INTRAVENOUS; SUBCUTANEOUS EVERY 5 MIN PRN
Status: DISCONTINUED | OUTPATIENT
Start: 2018-07-12 | End: 2018-07-12 | Stop reason: HOSPADM

## 2018-07-12 RX ORDER — HYDROCODONE BITARTRATE AND ACETAMINOPHEN 5; 325 MG/1; MG/1
2 TABLET ORAL PRN
Status: DISCONTINUED | OUTPATIENT
Start: 2018-07-12 | End: 2018-07-12 | Stop reason: HOSPADM

## 2018-07-12 RX ADMIN — PROPOFOL 150 MG: 10 INJECTION, EMULSION INTRAVENOUS at 16:47

## 2018-07-12 RX ADMIN — SODIUM CHLORIDE: 9 INJECTION, SOLUTION INTRAVENOUS at 06:17

## 2018-07-12 RX ADMIN — SODIUM CHLORIDE: 9 INJECTION, SOLUTION INTRAVENOUS at 16:25

## 2018-07-12 RX ADMIN — Medication 10 ML: at 08:44

## 2018-07-12 RX ADMIN — ONDANSETRON HYDROCHLORIDE 4 MG: 2 INJECTION, SOLUTION INTRAMUSCULAR; INTRAVENOUS at 08:44

## 2018-07-12 RX ADMIN — PROPOFOL 50 MG: 10 INJECTION, EMULSION INTRAVENOUS at 16:58

## 2018-07-12 RX ADMIN — CLINDAMYCIN IN 5 PERCENT DEXTROSE 600 MG: 12 INJECTION, SOLUTION INTRAVENOUS at 17:41

## 2018-07-12 RX ADMIN — ONDANSETRON HYDROCHLORIDE 4 MG: 2 INJECTION, SOLUTION INTRAMUSCULAR; INTRAVENOUS at 14:41

## 2018-07-12 RX ADMIN — ROCURONIUM BROMIDE 10 MG: 10 INJECTION INTRAVENOUS at 16:47

## 2018-07-12 RX ADMIN — MIDAZOLAM HYDROCHLORIDE 2 MG: 1 INJECTION, SOLUTION INTRAMUSCULAR; INTRAVENOUS at 16:43

## 2018-07-12 RX ADMIN — MORPHINE SULFATE 2 MG: 2 INJECTION, SOLUTION INTRAMUSCULAR; INTRAVENOUS at 14:41

## 2018-07-12 RX ADMIN — VANCOMYCIN HYDROCHLORIDE 1000 MG: 1 INJECTION, SOLUTION INTRAVENOUS at 14:40

## 2018-07-12 RX ADMIN — FENTANYL CITRATE 50 MCG: 50 INJECTION, SOLUTION INTRAMUSCULAR; INTRAVENOUS at 17:10

## 2018-07-12 RX ADMIN — MORPHINE SULFATE 2 MG: 2 INJECTION, SOLUTION INTRAMUSCULAR; INTRAVENOUS at 04:00

## 2018-07-12 RX ADMIN — HYDROMORPHONE HYDROCHLORIDE 0.5 MG: 1 INJECTION, SOLUTION INTRAMUSCULAR; INTRAVENOUS; SUBCUTANEOUS at 17:29

## 2018-07-12 RX ADMIN — KETOROLAC TROMETHAMINE 30 MG: 30 INJECTION, SOLUTION INTRAMUSCULAR at 17:14

## 2018-07-12 RX ADMIN — Medication 100 MG: at 16:47

## 2018-07-12 RX ADMIN — FENTANYL CITRATE 50 MCG: 50 INJECTION, SOLUTION INTRAMUSCULAR; INTRAVENOUS at 17:55

## 2018-07-12 RX ADMIN — OXYCODONE HYDROCHLORIDE AND ACETAMINOPHEN 1 TABLET: 5; 325 TABLET ORAL at 00:18

## 2018-07-12 RX ADMIN — FENTANYL CITRATE 50 MCG: 50 INJECTION, SOLUTION INTRAMUSCULAR; INTRAVENOUS at 17:49

## 2018-07-12 RX ADMIN — FENTANYL CITRATE 50 MCG: 50 INJECTION, SOLUTION INTRAMUSCULAR; INTRAVENOUS at 17:38

## 2018-07-12 RX ADMIN — HYDROMORPHONE HYDROCHLORIDE 0.5 MG: 1 INJECTION, SOLUTION INTRAMUSCULAR; INTRAVENOUS; SUBCUTANEOUS at 17:44

## 2018-07-12 RX ADMIN — HYDROMORPHONE HYDROCHLORIDE 0.5 MG: 1 INJECTION, SOLUTION INTRAMUSCULAR; INTRAVENOUS; SUBCUTANEOUS at 18:01

## 2018-07-12 RX ADMIN — ONDANSETRON HYDROCHLORIDE 4 MG: 2 INJECTION, SOLUTION INTRAMUSCULAR; INTRAVENOUS at 17:00

## 2018-07-12 RX ADMIN — LIDOCAINE HYDROCHLORIDE 80 MG: 20 INJECTION, SOLUTION INFILTRATION; PERINEURAL at 16:47

## 2018-07-12 RX ADMIN — OXYCODONE HYDROCHLORIDE AND ACETAMINOPHEN 1 TABLET: 5; 325 TABLET ORAL at 06:17

## 2018-07-12 RX ADMIN — MORPHINE SULFATE 2 MG: 2 INJECTION, SOLUTION INTRAMUSCULAR; INTRAVENOUS at 08:44

## 2018-07-12 RX ADMIN — VANCOMYCIN HYDROCHLORIDE 1000 MG: 1 INJECTION, SOLUTION INTRAVENOUS at 04:00

## 2018-07-12 RX ADMIN — FENTANYL CITRATE 50 MCG: 50 INJECTION, SOLUTION INTRAMUSCULAR; INTRAVENOUS at 18:05

## 2018-07-12 RX ADMIN — Medication 10 ML: at 21:22

## 2018-07-12 RX ADMIN — CLINDAMYCIN IN 5 PERCENT DEXTROSE 600 MG: 12 INJECTION, SOLUTION INTRAVENOUS at 00:24

## 2018-07-12 RX ADMIN — FENTANYL CITRATE 50 MCG: 50 INJECTION, SOLUTION INTRAMUSCULAR; INTRAVENOUS at 16:47

## 2018-07-12 RX ADMIN — CLINDAMYCIN IN 5 PERCENT DEXTROSE 600 MG: 12 INJECTION, SOLUTION INTRAVENOUS at 08:44

## 2018-07-12 ASSESSMENT — PAIN DESCRIPTION - PROGRESSION
CLINICAL_PROGRESSION: GRADUALLY IMPROVING

## 2018-07-12 ASSESSMENT — PAIN SCALES - GENERAL
PAINLEVEL_OUTOF10: 8
PAINLEVEL_OUTOF10: 10
PAINLEVEL_OUTOF10: 8
PAINLEVEL_OUTOF10: 8
PAINLEVEL_OUTOF10: 4
PAINLEVEL_OUTOF10: 0
PAINLEVEL_OUTOF10: 6
PAINLEVEL_OUTOF10: 10
PAINLEVEL_OUTOF10: 9
PAINLEVEL_OUTOF10: 4
PAINLEVEL_OUTOF10: 8
PAINLEVEL_OUTOF10: 10
PAINLEVEL_OUTOF10: 10
PAINLEVEL_OUTOF10: 7
PAINLEVEL_OUTOF10: 10
PAINLEVEL_OUTOF10: 10

## 2018-07-12 ASSESSMENT — PULMONARY FUNCTION TESTS
PIF_VALUE: 17
PIF_VALUE: 15
PIF_VALUE: 24
PIF_VALUE: 24
PIF_VALUE: 0
PIF_VALUE: 2
PIF_VALUE: 0
PIF_VALUE: 2
PIF_VALUE: 23
PIF_VALUE: 35
PIF_VALUE: 2
PIF_VALUE: 2
PIF_VALUE: 23
PIF_VALUE: 8
PIF_VALUE: 24
PIF_VALUE: 21
PIF_VALUE: 24
PIF_VALUE: 31
PIF_VALUE: 29
PIF_VALUE: 2
PIF_VALUE: 4
PIF_VALUE: 23
PIF_VALUE: 22
PIF_VALUE: 23
PIF_VALUE: 19
PIF_VALUE: 19
PIF_VALUE: 0
PIF_VALUE: 24
PIF_VALUE: 36

## 2018-07-12 ASSESSMENT — PAIN DESCRIPTION - PAIN TYPE: TYPE: ACUTE PAIN

## 2018-07-12 ASSESSMENT — PAIN DESCRIPTION - ORIENTATION: ORIENTATION: LEFT;DISTAL

## 2018-07-12 ASSESSMENT — PAIN DESCRIPTION - LOCATION: LOCATION: FACE;JAW

## 2018-07-12 ASSESSMENT — PAIN DESCRIPTION - ONSET: ONSET: AWAKENED FROM SLEEP

## 2018-07-12 ASSESSMENT — PAIN DESCRIPTION - FREQUENCY: FREQUENCY: CONTINUOUS

## 2018-07-12 ASSESSMENT — PAIN DESCRIPTION - DESCRIPTORS: DESCRIPTORS: ACHING;DISCOMFORT;DULL

## 2018-07-12 NOTE — FLOWSHEET NOTE
2000:  Assessment completed. Left mandible edematous and painful. Denied difficulty swallowing. Cough remains. 2200:  Medicated for pain as requested. 0000:  Slight increase in left mandible edema. No nausea. 0430:  Ice as ordered to left mandible. 0600:  Denies difficulty swallowing or breathing. Left mandible remains edematous and painful. Medicated as ordered throughout night.

## 2018-07-12 NOTE — ANESTHESIA PRE PROCEDURE
Department of Anesthesiology  Preprocedure Note       Name:  Melvin Cruz   Age:  37 y.o.  :  1975                                          MRN:  [id number]         Date:  2018      Surgeon: Magdalene Cano):  Garret Acosta DDS    Procedure: Procedure(s):  REMOVAL OF TOOTH #18 CARIOUS ABSCESS    Medications prior to admission:   Prior to Admission medications    Medication Sig Start Date End Date Taking?  Authorizing Provider   ranitidine (ZANTAC) 150 MG tablet Take 150 mg by mouth daily   Yes Historical Provider, MD       Current medications:    Current Facility-Administered Medications   Medication Dose Route Frequency Provider Last Rate Last Dose    morphine injection 2 mg  2 mg Intravenous Q4H PRN Carisa Nam MD   2 mg at 18 1441    potassium chloride (KLOR-CON M) extended release tablet 40 mEq  40 mEq Oral BID WC Carisa Nam MD        vancomycin (VANCOCIN) 1000 mg in dextrose 5% 200 mL IVPB  1,000 mg Intravenous Q12H Margie Scales  mL/hr at 18 1440 1,000 mg at 18 1440    oxyCODONE-acetaminophen (PERCOCET) 5-325 MG per tablet 1 tablet  1 tablet Oral Q4H PRN Carisa Nam MD   1 tablet at 18 0617    0.9 % sodium chloride infusion   Intravenous Continuous Carisa Nam  mL/hr at 18 0617      acetaminophen (TYLENOL) tablet 650 mg  650 mg Oral Q4H PRN Rigo Delgado PA-C   650 mg at 07/10/18 1613    sodium chloride flush 0.9 % injection 10 mL  10 mL Intravenous 2 times per day Yodit Ajz, APRN - CNP   10 mL at 18 0844    sodium chloride flush 0.9 % injection 10 mL  10 mL Intravenous PRN Yodit Jaz, APRN - CNP        magnesium hydroxide (MILK OF MAGNESIA) 400 MG/5ML suspension 30 mL  30 mL Oral Daily PRN Yodit Jaz, APRN - CNP        ondansetron (ZOFRAN) injection 4 mg  4 mg Intravenous Q6H PRN Yodit Jaz, APRN - CNP   4 mg at 18 1441    enoxaparin (LOVENOX) injection 40 mg  40 mg Subcutaneous Daily Kafilat Lio Mylene - CNP   Stopped at 07/11/18 1050    clindamycin (CLEOCIN) 600 mg in dextrose 5 % 50 mL IVPB  600 mg Intravenous Q8H MARIA EUGENIA Pitt - CNP   Stopped at 07/12/18 0915    ketorolac (TORADOL) injection 30 mg  30 mg Intravenous Q6H PRN Linette Serna APRN - CNP   30 mg at 07/11/18 2248       Allergies:  No Known Allergies    Problem List:    Patient Active Problem List   Diagnosis Code    Dental abscess K04.7    Facial cellulitis L03.211       Past Medical History:  History reviewed. No pertinent past medical history. Past Surgical History:  History reviewed. No pertinent surgical history. Social History:    Social History   Substance Use Topics    Smoking status: Never Smoker    Smokeless tobacco: Never Used    Alcohol use No                                Counseling given: Not Answered      Vital Signs (Current):   Vitals:    07/11/18 0005 07/11/18 0112 07/11/18 0231 07/11/18 0834   BP: 112/74 115/74 119/83 127/85   Pulse: 86 82 80 89   Resp: 16 16 16 16   Temp: 98.6 °F (37 °C) 98.2 °F (36.8 °C) 98.1 °F (36.7 °C) 99.3 °F (37.4 °C)   TempSrc: Oral Oral Oral Oral   SpO2: 98% 99% 98% 97%   Weight:       Height:                                                  BP Readings from Last 3 Encounters:   07/11/18 127/85   09/21/17 121/80       NPO Status:                                                                                 BMI:   Wt Readings from Last 3 Encounters:   07/09/18 194 lb 14.2 oz (88.4 kg)   09/21/17 211 lb (95.7 kg)     Body mass index is 34.52 kg/m².     CBC:   Lab Results   Component Value Date    WBC 6.0 07/12/2018    RBC 3.77 07/12/2018    HGB 7.9 07/12/2018    HCT 24.9 07/12/2018    MCV 66.2 07/12/2018    RDW 25.0 07/12/2018     07/12/2018       CMP:   Lab Results   Component Value Date     07/12/2018    K 3.2 07/12/2018    K 3.6 07/10/2018     07/12/2018    CO2 24 07/12/2018    BUN 6 07/12/2018    CREATININE 0.55 07/12/2018    GFRAA >60.0 07/12/2018

## 2018-07-12 NOTE — PROGRESS NOTES
Infectious Disease Progress Note    Patient informed that I am an ID physician on entry and permission obtained from the patient to speak in front of any visitors prior to any discussion for HIPPA purposes. 7/12/2018    Patient is a hospital followup regarding L dental abscess with facial cellulitis   Surgery today  +pain  + fevers  Oral surgery has seen patient. Cultures from surgery will be pending. L face swollen and painful  HEENT: EOMI, MMM, Neck is supple  Heart: S1 S2  Lungs:bilaterally clear  Abdomen: soft, ND, +BS, NTTP  Extrem:+pulses, no calf pain  Neuro exam: CN II-XII intact      Lab Results   Component Value Date    WBC 6.0 07/12/2018    HGB 7.9 (L) 07/12/2018    HCT 24.9 (L) 07/12/2018    MCV 66.2 (L) 07/12/2018     07/12/2018     Lab Results   Component Value Date     07/12/2018    K 3.2 07/12/2018    K 3.6 07/10/2018     07/12/2018    CO2 24 07/12/2018    BUN 6 07/12/2018    CREATININE 0.55 07/12/2018    GLUCOSE 112 07/12/2018    CALCIUM 7.8 07/12/2018        WBC trends are being monitored. Antibiotic doses are being adjusted per most recent renal labs. Vitals:    07/11/18 0834   BP: 127/85   Pulse: 89   Resp: 16   Temp: 99.3 °F (37.4 °C)   SpO2: 97%           Patient Active Problem List   Diagnosis    Dental abscess    Facial cellulitis     CT:  THERE IS DIFFUSE SUBCUTANEOUS STRANDING, INFLAMMATORY CHANGES OVERLYING THE LEFT PERIMANDIBULAR REGION. THERE IS ASSOCIATED ABSCESS SUBJACENT TO THE LEFT MANDIBLE AND REGIONS OF TOOTH NUMBER APPROXIMATELY 19 THROUGH 22. THERE IS SOME ASSOCIATED DENTAL    CARIES. FINDINGS MOST LIKELY REPRESENT ODONTOGENIC ABSCESS IN ORIGIN. ASSESSMENT and PLAN:    From an ID standpoint, will continue treatment for L dental abscess with L face cellulitis. Continue Vanco and Clindamycin for now. All cultures pending.      Imaging and labs were reviewed per medical records and any ID pertinent labs were addressed with the

## 2018-07-13 LAB
ANION GAP SERPL CALCULATED.3IONS-SCNC: 9 MEQ/L (ref 7–13)
ANISOCYTOSIS: ABNORMAL
BASOPHILS ABSOLUTE: 0 K/UL (ref 0–0.2)
BASOPHILS RELATIVE PERCENT: 0.5 %
BUN BLDV-MCNC: 4 MG/DL (ref 6–20)
CALCIUM SERPL-MCNC: 8.2 MG/DL (ref 8.6–10.2)
CHLORIDE BLD-SCNC: 103 MEQ/L (ref 98–107)
CO2: 27 MEQ/L (ref 22–29)
CREAT SERPL-MCNC: 0.57 MG/DL (ref 0.5–0.9)
EOSINOPHILS ABSOLUTE: 0 K/UL (ref 0–0.7)
EOSINOPHILS RELATIVE PERCENT: 0.5 %
FERRITIN: 28.4 NG/ML (ref 13–150)
GFR AFRICAN AMERICAN: >60
GFR NON-AFRICAN AMERICAN: >60
GLUCOSE BLD-MCNC: 89 MG/DL (ref 74–109)
HCT VFR BLD CALC: 26.7 % (ref 37–47)
HEMOGLOBIN: 8.5 G/DL (ref 12–16)
HYPOCHROMIA: ABNORMAL
IRON SATURATION: 7 % (ref 11–46)
IRON: 20 UG/DL (ref 37–145)
LYMPHOCYTES ABSOLUTE: 1.1 K/UL (ref 1–4.8)
LYMPHOCYTES RELATIVE PERCENT: 13.3 %
MCH RBC QN AUTO: 20.8 PG (ref 27–31.3)
MCHC RBC AUTO-ENTMCNC: 31.7 % (ref 33–37)
MCV RBC AUTO: 65.6 FL (ref 82–100)
MICROCYTES: ABNORMAL
MONOCYTES ABSOLUTE: 0.7 K/UL (ref 0.2–0.8)
MONOCYTES RELATIVE PERCENT: 8.8 %
NEUTROPHILS ABSOLUTE: 6.3 K/UL (ref 1.4–6.5)
NEUTROPHILS RELATIVE PERCENT: 76.9 %
PDW BLD-RTO: 24.8 % (ref 11.5–14.5)
PLATELET # BLD: 229 K/UL (ref 130–400)
PLATELET SLIDE REVIEW: NORMAL
POTASSIUM SERPL-SCNC: 3.3 MEQ/L (ref 3.5–5.1)
RBC # BLD: 4.06 M/UL (ref 4.2–5.4)
SODIUM BLD-SCNC: 139 MEQ/L (ref 132–144)
TOTAL IRON BINDING CAPACITY: 276 UG/DL (ref 178–450)
VANCOMYCIN TROUGH: 5.1 UG/ML (ref 10–20)
WBC # BLD: 8.2 K/UL (ref 4.8–10.8)

## 2018-07-13 PROCEDURE — 83540 ASSAY OF IRON: CPT

## 2018-07-13 PROCEDURE — 99232 SBSQ HOSP IP/OBS MODERATE 35: CPT | Performed by: INTERNAL MEDICINE

## 2018-07-13 PROCEDURE — 36415 COLL VENOUS BLD VENIPUNCTURE: CPT

## 2018-07-13 PROCEDURE — 2580000003 HC RX 258: Performed by: NURSE PRACTITIONER

## 2018-07-13 PROCEDURE — 6360000002 HC RX W HCPCS: Performed by: INTERNAL MEDICINE

## 2018-07-13 PROCEDURE — 2700000000 HC OXYGEN THERAPY PER DAY

## 2018-07-13 PROCEDURE — 6370000000 HC RX 637 (ALT 250 FOR IP): Performed by: INTERNAL MEDICINE

## 2018-07-13 PROCEDURE — 85025 COMPLETE CBC W/AUTO DIFF WBC: CPT

## 2018-07-13 PROCEDURE — 6360000002 HC RX W HCPCS: Performed by: NURSE PRACTITIONER

## 2018-07-13 PROCEDURE — 82728 ASSAY OF FERRITIN: CPT

## 2018-07-13 PROCEDURE — 2500000003 HC RX 250 WO HCPCS: Performed by: NURSE PRACTITIONER

## 2018-07-13 PROCEDURE — 1210000000 HC MED SURG R&B

## 2018-07-13 PROCEDURE — 80048 BASIC METABOLIC PNL TOTAL CA: CPT

## 2018-07-13 PROCEDURE — 83550 IRON BINDING TEST: CPT

## 2018-07-13 PROCEDURE — 80202 ASSAY OF VANCOMYCIN: CPT

## 2018-07-13 RX ORDER — POTASSIUM CHLORIDE 20 MEQ/1
40 TABLET, EXTENDED RELEASE ORAL ONCE
Status: COMPLETED | OUTPATIENT
Start: 2018-07-13 | End: 2018-07-13

## 2018-07-13 RX ADMIN — ENOXAPARIN SODIUM 40 MG: 100 INJECTION SUBCUTANEOUS at 08:17

## 2018-07-13 RX ADMIN — VANCOMYCIN HYDROCHLORIDE 1000 MG: 1 INJECTION, SOLUTION INTRAVENOUS at 14:54

## 2018-07-13 RX ADMIN — CLINDAMYCIN IN 5 PERCENT DEXTROSE 600 MG: 12 INJECTION, SOLUTION INTRAVENOUS at 17:30

## 2018-07-13 RX ADMIN — KETOROLAC TROMETHAMINE 30 MG: 30 INJECTION, SOLUTION INTRAMUSCULAR; INTRAVENOUS at 23:57

## 2018-07-13 RX ADMIN — CLINDAMYCIN IN 5 PERCENT DEXTROSE 600 MG: 12 INJECTION, SOLUTION INTRAVENOUS at 08:17

## 2018-07-13 RX ADMIN — MORPHINE SULFATE 2 MG: 2 INJECTION, SOLUTION INTRAMUSCULAR; INTRAVENOUS at 08:16

## 2018-07-13 RX ADMIN — KETOROLAC TROMETHAMINE 30 MG: 30 INJECTION, SOLUTION INTRAMUSCULAR; INTRAVENOUS at 17:30

## 2018-07-13 RX ADMIN — VANCOMYCIN HYDROCHLORIDE 1000 MG: 1 INJECTION, SOLUTION INTRAVENOUS at 01:19

## 2018-07-13 RX ADMIN — CLINDAMYCIN IN 5 PERCENT DEXTROSE 600 MG: 12 INJECTION, SOLUTION INTRAVENOUS at 00:38

## 2018-07-13 RX ADMIN — KETOROLAC TROMETHAMINE 30 MG: 30 INJECTION, SOLUTION INTRAMUSCULAR; INTRAVENOUS at 11:04

## 2018-07-13 RX ADMIN — OXYCODONE HYDROCHLORIDE AND ACETAMINOPHEN 1 TABLET: 5; 325 TABLET ORAL at 05:26

## 2018-07-13 RX ADMIN — POTASSIUM CHLORIDE 40 MEQ: 20 TABLET, EXTENDED RELEASE ORAL at 11:04

## 2018-07-13 RX ADMIN — Medication 10 ML: at 08:17

## 2018-07-13 RX ADMIN — OXYCODONE HYDROCHLORIDE AND ACETAMINOPHEN 1 TABLET: 5; 325 TABLET ORAL at 21:52

## 2018-07-13 RX ADMIN — MORPHINE SULFATE 2 MG: 2 INJECTION, SOLUTION INTRAMUSCULAR; INTRAVENOUS at 00:38

## 2018-07-13 RX ADMIN — OXYCODONE HYDROCHLORIDE AND ACETAMINOPHEN 1 TABLET: 5; 325 TABLET ORAL at 14:55

## 2018-07-13 RX ADMIN — KETOROLAC TROMETHAMINE 30 MG: 30 INJECTION, SOLUTION INTRAMUSCULAR; INTRAVENOUS at 02:58

## 2018-07-13 ASSESSMENT — PAIN SCALES - GENERAL
PAINLEVEL_OUTOF10: 8
PAINLEVEL_OUTOF10: 9
PAINLEVEL_OUTOF10: 10
PAINLEVEL_OUTOF10: 5
PAINLEVEL_OUTOF10: 9
PAINLEVEL_OUTOF10: 10

## 2018-07-13 NOTE — PROGRESS NOTES
Pt medicated for pain through out this shift. Sleeping at this time. Continues liquid diet. Tolerating fluids well. Unlabored breathing, no distress noted at this time.

## 2018-07-13 NOTE — PROGRESS NOTES
Infectious Disease Progress Note    Patient informed that I am an ID physician on entry and permission obtained from the patient to speak in front of any visitors prior to any discussion for HIPPA purposes. 7/13/2018    Patient is a hospital followup regarding L dental abscess with facial cellulitis     +pain  No new fevers  S/p extraction of tooth - cultures are pending. Her lower lip and chin are swollen. Remains on Vanco and Clinda for now. HEENT: EOMI, MMM, Neck is supple, + chin swelling and lip swelling. Heart: S1 S2  Lungs:bilaterally clear  Abdomen: soft, ND, +BS, NTTP  Extrem:+pulses, no calf pain  Neuro exam: CN II-XII intact      Lab Results   Component Value Date    WBC 8.2 07/13/2018    HGB 8.5 (L) 07/13/2018    HCT 26.7 (L) 07/13/2018    MCV 65.6 (L) 07/13/2018     07/13/2018     Lab Results   Component Value Date     07/13/2018    K 3.3 07/13/2018    K 3.6 07/10/2018     07/13/2018    CO2 27 07/13/2018    BUN 4 07/13/2018    CREATININE 0.57 07/13/2018    GLUCOSE 89 07/13/2018    CALCIUM 8.2 07/13/2018        WBC trends are being monitored. Antibiotic doses are being adjusted per most recent renal labs. Vitals:    07/13/18 0041   BP: 131/74   Pulse: 86   Resp: 16   Temp:    SpO2: 99%           Patient Active Problem List   Diagnosis    Dental abscess    Facial cellulitis     CT:  THERE IS DIFFUSE SUBCUTANEOUS STRANDING, INFLAMMATORY CHANGES OVERLYING THE LEFT PERIMANDIBULAR REGION. THERE IS ASSOCIATED ABSCESS SUBJACENT TO THE LEFT MANDIBLE AND REGIONS OF TOOTH NUMBER APPROXIMATELY 19 THROUGH 22. THERE IS SOME ASSOCIATED DENTAL    CARIES. FINDINGS MOST LIKELY REPRESENT ODONTOGENIC ABSCESS IN ORIGIN. ASSESSMENT and PLAN:    From an ID standpoint, will continue treatment for L dental abscess with L face cellulitis. + chin and lower lip swelling post surgery. Continue Vanco and Clindamycin for now. All cultures pending. Likely PO on discharge.  No PICC

## 2018-07-13 NOTE — PROGRESS NOTES
last 72 hours. No results for input(s): INR in the last 72 hours. No results for input(s): Eyal Car in the last 72 hours. Urinalysis:    No results found for: Mirian Beltranander, BACTERIA, RBCUA, BLOODU, [address]    Radiology:  CT MAXILLOFACIAL W CONTRAST   Final Result      THERE IS DIFFUSE SUBCUTANEOUS STRANDING, INFLAMMATORY CHANGES OVERLYING THE LEFT PERIMANDIBULAR REGION. THERE IS ASSOCIATED ABSCESS SUBJACENT TO THE LEFT MANDIBLE AND REGIONS OF TOOTH NUMBER APPROXIMATELY 19 THROUGH 22. THERE IS SOME ASSOCIATED DENTAL    CARIES. FINDINGS MOST LIKELY REPRESENT ODONTOGENIC ABSCESS IN ORIGIN. Other findings detailed above      All CT scans at this facility use dose modulation, iterative reconstruction, and/or weight based dosing when appropriate to reduce radiation dose to as low as reasonably achievable. Assessment/Plan:    Active Hospital Problems    Diagnosis Date Noted    Facial cellulitis [R21.652]     Dental abscess [K04.7] 07/09/2018     1. Dental Abscess with fever,tachycardia and leukocytosis  -  s/p extraction of tooth on 7/12  -  On IV antibiotics  -  ID and oral surgery following    2. Acute posthemorrhagic/chronic microcytic anemia  - Hb improved s/p transfusion of 2 units of PRBCs  - monitor H/H     3.  Hypokalemia  -replaced     Diet: DIET CLEAR LIQUID; Dental Soft    Code Status: Full Code               Electronically signed by Rebecca Mares MD on 7/13/2018 at 8:53 AM

## 2018-07-13 NOTE — CARE COORDINATION
SPOKE WITH DR Garcia [facility],[address]. AWAITING CULTURE RESULTS.   SHE DID SAY THAT SHE ANTICIPATES SENDING PATIENT HOME ON ORAL ANTIBIOTICS

## 2018-07-14 ENCOUNTER — APPOINTMENT (OUTPATIENT)
Dept: GENERAL RADIOLOGY | Age: 43
DRG: 098 | End: 2018-07-14
Payer: COMMERCIAL

## 2018-07-14 LAB
ANION GAP SERPL CALCULATED.3IONS-SCNC: 11 MEQ/L (ref 7–13)
BASOPHILS ABSOLUTE: 0.1 K/UL (ref 0–0.2)
BASOPHILS RELATIVE PERCENT: 0.9 %
BUN BLDV-MCNC: 6 MG/DL (ref 6–20)
CALCIUM SERPL-MCNC: 8.4 MG/DL (ref 8.6–10.2)
CHLORIDE BLD-SCNC: 105 MEQ/L (ref 98–107)
CO2: 25 MEQ/L (ref 22–29)
CREAT SERPL-MCNC: 0.49 MG/DL (ref 0.5–0.9)
EOSINOPHILS ABSOLUTE: 0.2 K/UL (ref 0–0.7)
EOSINOPHILS RELATIVE PERCENT: 2.4 %
GFR AFRICAN AMERICAN: >60
GFR NON-AFRICAN AMERICAN: >60
GLUCOSE BLD-MCNC: 91 MG/DL (ref 74–109)
HCT VFR BLD CALC: 26.8 % (ref 37–47)
HEMOGLOBIN: 8.3 G/DL (ref 12–16)
LYMPHOCYTES ABSOLUTE: 1.3 K/UL (ref 1–4.8)
LYMPHOCYTES RELATIVE PERCENT: 20.4 %
MCH RBC QN AUTO: 20.3 PG (ref 27–31.3)
MCHC RBC AUTO-ENTMCNC: 31 % (ref 33–37)
MCV RBC AUTO: 65.4 FL (ref 82–100)
MONOCYTES ABSOLUTE: 0.5 K/UL (ref 0.2–0.8)
MONOCYTES RELATIVE PERCENT: 7.4 %
NEUTROPHILS ABSOLUTE: 4.4 K/UL (ref 1.4–6.5)
NEUTROPHILS RELATIVE PERCENT: 68.9 %
PDW BLD-RTO: 24.9 % (ref 11.5–14.5)
PLATELET # BLD: 244 K/UL (ref 130–400)
POTASSIUM SERPL-SCNC: 3.1 MEQ/L (ref 3.5–5.1)
RBC # BLD: 4.09 M/UL (ref 4.2–5.4)
SODIUM BLD-SCNC: 141 MEQ/L (ref 132–144)
WBC # BLD: 6.4 K/UL (ref 4.8–10.8)

## 2018-07-14 PROCEDURE — 2500000003 HC RX 250 WO HCPCS: Performed by: NURSE PRACTITIONER

## 2018-07-14 PROCEDURE — 6360000002 HC RX W HCPCS: Performed by: INTERNAL MEDICINE

## 2018-07-14 PROCEDURE — 6370000000 HC RX 637 (ALT 250 FOR IP): Performed by: INTERNAL MEDICINE

## 2018-07-14 PROCEDURE — 6370000000 HC RX 637 (ALT 250 FOR IP): Performed by: NURSE PRACTITIONER

## 2018-07-14 PROCEDURE — 36415 COLL VENOUS BLD VENIPUNCTURE: CPT

## 2018-07-14 PROCEDURE — 85025 COMPLETE CBC W/AUTO DIFF WBC: CPT

## 2018-07-14 PROCEDURE — 2500000003 HC RX 250 WO HCPCS: Performed by: DENTIST

## 2018-07-14 PROCEDURE — 80048 BASIC METABOLIC PNL TOTAL CA: CPT

## 2018-07-14 PROCEDURE — 70110 X-RAY EXAM OF JAW 4/> VIEWS: CPT

## 2018-07-14 PROCEDURE — 1210000000 HC MED SURG R&B

## 2018-07-14 PROCEDURE — 6360000002 HC RX W HCPCS: Performed by: NURSE PRACTITIONER

## 2018-07-14 PROCEDURE — 2580000003 HC RX 258: Performed by: NURSE PRACTITIONER

## 2018-07-14 PROCEDURE — 99232 SBSQ HOSP IP/OBS MODERATE 35: CPT | Performed by: INTERNAL MEDICINE

## 2018-07-14 RX ORDER — CLINDAMYCIN PHOSPHATE 900 MG/50ML
900 INJECTION INTRAVENOUS EVERY 6 HOURS
Status: DISCONTINUED | OUTPATIENT
Start: 2018-07-14 | End: 2018-07-16

## 2018-07-14 RX ORDER — POTASSIUM CHLORIDE 20 MEQ/1
40 TABLET, EXTENDED RELEASE ORAL ONCE
Status: COMPLETED | OUTPATIENT
Start: 2018-07-14 | End: 2018-07-14

## 2018-07-14 RX ORDER — AMOXICILLIN AND CLAVULANATE POTASSIUM 875; 125 MG/1; MG/1
1 TABLET, FILM COATED ORAL 2 TIMES DAILY
Qty: 20 TABLET | Refills: 0 | Status: SHIPPED | OUTPATIENT
Start: 2018-07-14 | End: 2018-07-18 | Stop reason: HOSPADM

## 2018-07-14 RX ORDER — L. ACIDOPHILUS/L.BULGARICUS 1MM CELL
1 TABLET ORAL 2 TIMES DAILY
Status: DISCONTINUED | OUTPATIENT
Start: 2018-07-14 | End: 2018-07-21 | Stop reason: HOSPADM

## 2018-07-14 RX ORDER — MORPHINE SULFATE 2 MG/ML
2 INJECTION, SOLUTION INTRAMUSCULAR; INTRAVENOUS EVERY 4 HOURS PRN
Status: DISCONTINUED | OUTPATIENT
Start: 2018-07-14 | End: 2018-07-15

## 2018-07-14 RX ADMIN — KETOROLAC TROMETHAMINE 30 MG: 30 INJECTION, SOLUTION INTRAMUSCULAR; INTRAVENOUS at 13:49

## 2018-07-14 RX ADMIN — ONDANSETRON HYDROCHLORIDE 4 MG: 2 INJECTION, SOLUTION INTRAMUSCULAR; INTRAVENOUS at 21:48

## 2018-07-14 RX ADMIN — Medication 10 ML: at 01:23

## 2018-07-14 RX ADMIN — MORPHINE SULFATE 2 MG: 2 INJECTION, SOLUTION INTRAMUSCULAR; INTRAVENOUS at 16:40

## 2018-07-14 RX ADMIN — Medication 10 ML: at 09:46

## 2018-07-14 RX ADMIN — KETOROLAC TROMETHAMINE 30 MG: 30 INJECTION, SOLUTION INTRAMUSCULAR; INTRAVENOUS at 19:56

## 2018-07-14 RX ADMIN — ENOXAPARIN SODIUM 40 MG: 100 INJECTION SUBCUTANEOUS at 09:45

## 2018-07-14 RX ADMIN — POTASSIUM CHLORIDE 40 MEQ: 1500 TABLET, EXTENDED RELEASE ORAL at 13:49

## 2018-07-14 RX ADMIN — LACTOBACILLUS TAB 1 TABLET: TAB at 14:10

## 2018-07-14 RX ADMIN — CLINDAMYCIN IN 5 PERCENT DEXTROSE 600 MG: 12 INJECTION, SOLUTION INTRAVENOUS at 01:24

## 2018-07-14 RX ADMIN — MORPHINE SULFATE 2 MG: 2 INJECTION, SOLUTION INTRAMUSCULAR; INTRAVENOUS at 21:48

## 2018-07-14 RX ADMIN — KETOROLAC TROMETHAMINE 30 MG: 30 INJECTION, SOLUTION INTRAMUSCULAR; INTRAVENOUS at 06:52

## 2018-07-14 RX ADMIN — OXYCODONE HYDROCHLORIDE AND ACETAMINOPHEN 1 TABLET: 5; 325 TABLET ORAL at 09:45

## 2018-07-14 RX ADMIN — CLINDAMYCIN IN 5 PERCENT DEXTROSE 900 MG: 18 INJECTION, SOLUTION INTRAVENOUS at 21:48

## 2018-07-14 RX ADMIN — OXYCODONE HYDROCHLORIDE AND ACETAMINOPHEN 1 TABLET: 5; 325 TABLET ORAL at 02:05

## 2018-07-14 RX ADMIN — LACTOBACILLUS TAB 1 TABLET: TAB at 21:48

## 2018-07-14 RX ADMIN — VANCOMYCIN HYDROCHLORIDE 1000 MG: 1 INJECTION, SOLUTION INTRAVENOUS at 02:23

## 2018-07-14 RX ADMIN — CLINDAMYCIN IN 5 PERCENT DEXTROSE 900 MG: 18 INJECTION, SOLUTION INTRAVENOUS at 15:12

## 2018-07-14 RX ADMIN — VANCOMYCIN HYDROCHLORIDE 1000 MG: 1 INJECTION, SOLUTION INTRAVENOUS at 13:49

## 2018-07-14 RX ADMIN — CLINDAMYCIN IN 5 PERCENT DEXTROSE 600 MG: 12 INJECTION, SOLUTION INTRAVENOUS at 09:45

## 2018-07-14 RX ADMIN — MAGNESIUM HYDROXIDE 30 ML: 400 SUSPENSION ORAL at 21:48

## 2018-07-14 RX ADMIN — Medication 10 ML: at 21:48

## 2018-07-14 ASSESSMENT — PAIN SCALES - GENERAL
PAINLEVEL_OUTOF10: 10
PAINLEVEL_OUTOF10: 8
PAINLEVEL_OUTOF10: 10
PAINLEVEL_OUTOF10: 7
PAINLEVEL_OUTOF10: 8
PAINLEVEL_OUTOF10: 10
PAINLEVEL_OUTOF10: 9
PAINLEVEL_OUTOF10: 8

## 2018-07-14 ASSESSMENT — PAIN DESCRIPTION - PAIN TYPE: TYPE: ACUTE PAIN

## 2018-07-14 ASSESSMENT — PAIN DESCRIPTION - ORIENTATION: ORIENTATION: LEFT

## 2018-07-14 ASSESSMENT — PAIN DESCRIPTION - LOCATION: LOCATION: MOUTH

## 2018-07-14 NOTE — PROGRESS NOTES
7/14/2018    F/U L dental abscess with facial cellulitis with remarkable improvement with decreased swelling, pain. On soft diet, well tolerated. On IV Vanco and Clinda, well tolerated. Cx was noted. 14 ROS is negative otherwise. Vitals:    07/13/18 1545 07/14/18 0019 07/14/18 0801 07/14/18 1118   BP: 117/75 (!) 136/92 127/88 (!) 151/102   Pulse: 74 74 84 77   Resp: 16 18 16 18   Temp: 99 °F (37.2 °C) 97.5 °F (36.4 °C) 98.1 °F (36.7 °C) 98.4 °F (36.9 °C)   TempSrc: Oral Oral Oral Oral   SpO2: 99% 97% 99% 98%   Weight:       Height:         General Appearance: alert and oriented to person, place and time, well-developed and well-nourished, in no acute distress  Skin: warm and dry, no rash. Head: normocephalic and atraumatic  Eyes: anicteric sclerae  ENT: oropharynx clear and moist with normal mucous membranes.  No oral thrush  Decreased L facial swelling and tenderness  Lungs: normal respiratory effort  Abdomen: soft, no tenderness  No leg edema  No erythema, no tenderness    Lab Results   Component Value Date    WBC 6.4 07/14/2018    HGB 8.3 (L) 07/14/2018    HCT 26.8 (L) 07/14/2018    MCV 65.4 (L) 07/14/2018     07/14/2018     Lab Results   Component Value Date     07/14/2018    K 3.1 07/14/2018    K 3.6 07/10/2018     07/14/2018    CO2 25 07/14/2018    BUN 6 07/14/2018    CREATININE 0.49 07/14/2018    GLUCOSE 91 07/14/2018    CALCIUM 8.4 07/14/2018            Patient Active Problem List   Diagnosis    Dental abscess    Facial cellulitis   ASSESSMENT:   L facial cellulitis  Dental abscess    PLAN:  PO Augmentin for 10 days on discharge  Lactobacillus  F/U PRN  Check Cx  Ash Cottrell MD

## 2018-07-15 ENCOUNTER — ANESTHESIA (OUTPATIENT)
Dept: OPERATING ROOM | Age: 43
DRG: 098 | End: 2018-07-15
Payer: COMMERCIAL

## 2018-07-15 ENCOUNTER — ANESTHESIA EVENT (OUTPATIENT)
Dept: OPERATING ROOM | Age: 43
DRG: 098 | End: 2018-07-15
Payer: COMMERCIAL

## 2018-07-15 VITALS — OXYGEN SATURATION: 100 % | SYSTOLIC BLOOD PRESSURE: 116 MMHG | DIASTOLIC BLOOD PRESSURE: 78 MMHG

## 2018-07-15 LAB
ANAEROBIC CULTURE: ABNORMAL
ANION GAP SERPL CALCULATED.3IONS-SCNC: 15 MEQ/L (ref 7–13)
BASOPHILS ABSOLUTE: 0.1 K/UL (ref 0–0.2)
BASOPHILS RELATIVE PERCENT: 0.8 %
BLOOD CULTURE, ROUTINE: NORMAL
BUN BLDV-MCNC: 7 MG/DL (ref 6–20)
CALCIUM SERPL-MCNC: 8.8 MG/DL (ref 8.6–10.2)
CHLORIDE BLD-SCNC: 101 MEQ/L (ref 98–107)
CO2: 24 MEQ/L (ref 22–29)
CREAT SERPL-MCNC: 0.52 MG/DL (ref 0.5–0.9)
EOSINOPHILS ABSOLUTE: 0.2 K/UL (ref 0–0.7)
EOSINOPHILS RELATIVE PERCENT: 2.9 %
GFR AFRICAN AMERICAN: >60
GFR NON-AFRICAN AMERICAN: >60
GLUCOSE BLD-MCNC: 84 MG/DL (ref 74–109)
GRAM STAIN RESULT: ABNORMAL
HCT VFR BLD CALC: 31.1 % (ref 37–47)
HEMOGLOBIN: 9.8 G/DL (ref 12–16)
LYMPHOCYTES ABSOLUTE: 1.8 K/UL (ref 1–4.8)
LYMPHOCYTES RELATIVE PERCENT: 23.2 %
MCH RBC QN AUTO: 20.6 PG (ref 27–31.3)
MCHC RBC AUTO-ENTMCNC: 31.5 % (ref 33–37)
MCV RBC AUTO: 65.5 FL (ref 82–100)
MONOCYTES ABSOLUTE: 0.6 K/UL (ref 0.2–0.8)
MONOCYTES RELATIVE PERCENT: 7.8 %
NEUTROPHILS ABSOLUTE: 5 K/UL (ref 1.4–6.5)
NEUTROPHILS RELATIVE PERCENT: 65.3 %
ORGANISM: ABNORMAL
PDW BLD-RTO: 25.8 % (ref 11.5–14.5)
PLATELET # BLD: 296 K/UL (ref 130–400)
POTASSIUM SERPL-SCNC: 3.7 MEQ/L (ref 3.5–5.1)
RBC # BLD: 4.74 M/UL (ref 4.2–5.4)
SODIUM BLD-SCNC: 140 MEQ/L (ref 132–144)
WBC # BLD: 7.7 K/UL (ref 4.8–10.8)
WOUND/ABSCESS: ABNORMAL

## 2018-07-15 PROCEDURE — 2709999900 HC NON-CHARGEABLE SUPPLY: Performed by: DENTIST

## 2018-07-15 PROCEDURE — 2580000003 HC RX 258: Performed by: DENTIST

## 2018-07-15 PROCEDURE — 6370000000 HC RX 637 (ALT 250 FOR IP): Performed by: DENTIST

## 2018-07-15 PROCEDURE — 87070 CULTURE OTHR SPECIMN AEROBIC: CPT

## 2018-07-15 PROCEDURE — 3700000001 HC ADD 15 MINUTES (ANESTHESIA): Performed by: DENTIST

## 2018-07-15 PROCEDURE — 88311 DECALCIFY TISSUE: CPT

## 2018-07-15 PROCEDURE — 88304 TISSUE EXAM BY PATHOLOGIST: CPT

## 2018-07-15 PROCEDURE — 6360000002 HC RX W HCPCS: Performed by: INTERNAL MEDICINE

## 2018-07-15 PROCEDURE — 2500000003 HC RX 250 WO HCPCS: Performed by: DENTIST

## 2018-07-15 PROCEDURE — 6360000002 HC RX W HCPCS: Performed by: STUDENT IN AN ORGANIZED HEALTH CARE EDUCATION/TRAINING PROGRAM

## 2018-07-15 PROCEDURE — 3600000012 HC SURGERY LEVEL 2 ADDTL 15MIN: Performed by: DENTIST

## 2018-07-15 PROCEDURE — 2580000003 HC RX 258: Performed by: STUDENT IN AN ORGANIZED HEALTH CARE EDUCATION/TRAINING PROGRAM

## 2018-07-15 PROCEDURE — 6370000000 HC RX 637 (ALT 250 FOR IP): Performed by: INTERNAL MEDICINE

## 2018-07-15 PROCEDURE — 3700000000 HC ANESTHESIA ATTENDED CARE: Performed by: DENTIST

## 2018-07-15 PROCEDURE — 85025 COMPLETE CBC W/AUTO DIFF WBC: CPT

## 2018-07-15 PROCEDURE — 0W9500Z DRAINAGE OF LOWER JAW WITH DRAINAGE DEVICE, OPEN APPROACH: ICD-10-PCS | Performed by: DENTIST

## 2018-07-15 PROCEDURE — 7100000000 HC PACU RECOVERY - FIRST 15 MIN: Performed by: DENTIST

## 2018-07-15 PROCEDURE — 6360000002 HC RX W HCPCS: Performed by: DENTIST

## 2018-07-15 PROCEDURE — 80048 BASIC METABOLIC PNL TOTAL CA: CPT

## 2018-07-15 PROCEDURE — 0CDXXZ0 EXTRACTION OF LOWER TOOTH, SINGLE, EXTERNAL APPROACH: ICD-10-PCS | Performed by: DENTIST

## 2018-07-15 PROCEDURE — 87205 SMEAR GRAM STAIN: CPT

## 2018-07-15 PROCEDURE — 7100000001 HC PACU RECOVERY - ADDTL 15 MIN: Performed by: DENTIST

## 2018-07-15 PROCEDURE — 3600000002 HC SURGERY LEVEL 2 BASE: Performed by: DENTIST

## 2018-07-15 PROCEDURE — 87185 SC STD ENZYME DETCJ PER NZM: CPT

## 2018-07-15 PROCEDURE — 87075 CULTR BACTERIA EXCEPT BLOOD: CPT

## 2018-07-15 PROCEDURE — 1210000000 HC MED SURG R&B

## 2018-07-15 PROCEDURE — 0CBX0Z1 EXCISION OF LOWER TOOTH, OPEN APPROACH, MULTIPLE: ICD-10-PCS | Performed by: DENTIST

## 2018-07-15 PROCEDURE — 2580000003 HC RX 258: Performed by: NURSE PRACTITIONER

## 2018-07-15 PROCEDURE — 36415 COLL VENOUS BLD VENIPUNCTURE: CPT

## 2018-07-15 RX ORDER — SUCCINYLCHOLINE/SOD CL,ISO/PF 100 MG/5ML
SYRINGE (ML) INTRAVENOUS PRN
Status: DISCONTINUED | OUTPATIENT
Start: 2018-07-15 | End: 2018-07-15 | Stop reason: SDUPTHER

## 2018-07-15 RX ORDER — DIPHENHYDRAMINE HYDROCHLORIDE 50 MG/ML
12.5 INJECTION INTRAMUSCULAR; INTRAVENOUS
Status: DISCONTINUED | OUTPATIENT
Start: 2018-07-15 | End: 2018-07-15 | Stop reason: HOSPADM

## 2018-07-15 RX ORDER — PROPOFOL 10 MG/ML
INJECTION, EMULSION INTRAVENOUS PRN
Status: DISCONTINUED | OUTPATIENT
Start: 2018-07-15 | End: 2018-07-15 | Stop reason: SDUPTHER

## 2018-07-15 RX ORDER — LIDOCAINE HYDROCHLORIDE 10 MG/ML
1 INJECTION, SOLUTION EPIDURAL; INFILTRATION; INTRACAUDAL; PERINEURAL
Status: DISCONTINUED | OUTPATIENT
Start: 2018-07-15 | End: 2018-07-15 | Stop reason: HOSPADM

## 2018-07-15 RX ORDER — SODIUM CHLORIDE 0.9 % (FLUSH) 0.9 %
10 SYRINGE (ML) INJECTION PRN
Status: DISCONTINUED | OUTPATIENT
Start: 2018-07-15 | End: 2018-07-15 | Stop reason: HOSPADM

## 2018-07-15 RX ORDER — CLINDAMYCIN PHOSPHATE 600 MG/50ML
INJECTION INTRAVENOUS CONTINUOUS PRN
Status: COMPLETED | OUTPATIENT
Start: 2018-07-15 | End: 2018-07-15

## 2018-07-15 RX ORDER — MEPERIDINE HYDROCHLORIDE 25 MG/ML
12.5 INJECTION INTRAMUSCULAR; INTRAVENOUS; SUBCUTANEOUS EVERY 5 MIN PRN
Status: DISCONTINUED | OUTPATIENT
Start: 2018-07-15 | End: 2018-07-15 | Stop reason: HOSPADM

## 2018-07-15 RX ORDER — FENTANYL CITRATE 50 UG/ML
50 INJECTION, SOLUTION INTRAMUSCULAR; INTRAVENOUS EVERY 10 MIN PRN
Status: DISCONTINUED | OUTPATIENT
Start: 2018-07-15 | End: 2018-07-15 | Stop reason: HOSPADM

## 2018-07-15 RX ORDER — FENTANYL CITRATE 50 UG/ML
INJECTION, SOLUTION INTRAMUSCULAR; INTRAVENOUS PRN
Status: DISCONTINUED | OUTPATIENT
Start: 2018-07-15 | End: 2018-07-15 | Stop reason: SDUPTHER

## 2018-07-15 RX ORDER — ONDANSETRON 2 MG/ML
4 INJECTION INTRAMUSCULAR; INTRAVENOUS
Status: DISCONTINUED | OUTPATIENT
Start: 2018-07-15 | End: 2018-07-15 | Stop reason: HOSPADM

## 2018-07-15 RX ORDER — SODIUM CHLORIDE, SODIUM LACTATE, POTASSIUM CHLORIDE, CALCIUM CHLORIDE 600; 310; 30; 20 MG/100ML; MG/100ML; MG/100ML; MG/100ML
INJECTION, SOLUTION INTRAVENOUS CONTINUOUS
Status: DISCONTINUED | OUTPATIENT
Start: 2018-07-15 | End: 2018-07-17

## 2018-07-15 RX ORDER — HYDROCODONE BITARTRATE AND ACETAMINOPHEN 5; 325 MG/1; MG/1
1 TABLET ORAL PRN
Status: DISCONTINUED | OUTPATIENT
Start: 2018-07-15 | End: 2018-07-15 | Stop reason: HOSPADM

## 2018-07-15 RX ORDER — ONDANSETRON 2 MG/ML
INJECTION INTRAMUSCULAR; INTRAVENOUS PRN
Status: DISCONTINUED | OUTPATIENT
Start: 2018-07-15 | End: 2018-07-15 | Stop reason: SDUPTHER

## 2018-07-15 RX ORDER — OXYCODONE HYDROCHLORIDE AND ACETAMINOPHEN 5; 325 MG/1; MG/1
1 TABLET ORAL EVERY 4 HOURS PRN
Status: DISCONTINUED | OUTPATIENT
Start: 2018-07-15 | End: 2018-07-19

## 2018-07-15 RX ORDER — DEXAMETHASONE SODIUM PHOSPHATE 10 MG/ML
INJECTION INTRAMUSCULAR; INTRAVENOUS PRN
Status: DISCONTINUED | OUTPATIENT
Start: 2018-07-15 | End: 2018-07-15 | Stop reason: SDUPTHER

## 2018-07-15 RX ORDER — HYDROCODONE BITARTRATE AND ACETAMINOPHEN 5; 325 MG/1; MG/1
2 TABLET ORAL PRN
Status: DISCONTINUED | OUTPATIENT
Start: 2018-07-15 | End: 2018-07-15 | Stop reason: HOSPADM

## 2018-07-15 RX ORDER — IBUPROFEN 800 MG/1
800 TABLET ORAL EVERY 4 HOURS PRN
Status: DISCONTINUED | OUTPATIENT
Start: 2018-07-15 | End: 2018-07-17

## 2018-07-15 RX ORDER — METOCLOPRAMIDE HYDROCHLORIDE 5 MG/ML
10 INJECTION INTRAMUSCULAR; INTRAVENOUS
Status: DISCONTINUED | OUTPATIENT
Start: 2018-07-15 | End: 2018-07-15 | Stop reason: HOSPADM

## 2018-07-15 RX ORDER — SODIUM CHLORIDE 0.9 % (FLUSH) 0.9 %
10 SYRINGE (ML) INJECTION EVERY 12 HOURS SCHEDULED
Status: DISCONTINUED | OUTPATIENT
Start: 2018-07-15 | End: 2018-07-15 | Stop reason: HOSPADM

## 2018-07-15 RX ORDER — MAGNESIUM HYDROXIDE 1200 MG/15ML
LIQUID ORAL CONTINUOUS PRN
Status: COMPLETED | OUTPATIENT
Start: 2018-07-15 | End: 2018-07-15

## 2018-07-15 RX ORDER — DEXTROSE, SODIUM CHLORIDE, SODIUM LACTATE, POTASSIUM CHLORIDE, AND CALCIUM CHLORIDE 5; .6; .31; .03; .02 G/100ML; G/100ML; G/100ML; G/100ML; G/100ML
INJECTION, SOLUTION INTRAVENOUS CONTINUOUS
Status: DISCONTINUED | OUTPATIENT
Start: 2018-07-15 | End: 2018-07-17

## 2018-07-15 RX ADMIN — HYDROMORPHONE HYDROCHLORIDE 0.5 MG: 1 INJECTION, SOLUTION INTRAMUSCULAR; INTRAVENOUS; SUBCUTANEOUS at 10:37

## 2018-07-15 RX ADMIN — FENTANYL CITRATE 50 MCG: 50 INJECTION, SOLUTION INTRAMUSCULAR; INTRAVENOUS at 08:30

## 2018-07-15 RX ADMIN — OXYCODONE HYDROCHLORIDE AND ACETAMINOPHEN 1 TABLET: 5; 325 TABLET ORAL at 21:32

## 2018-07-15 RX ADMIN — Medication 10 ML: at 22:54

## 2018-07-15 RX ADMIN — FENTANYL CITRATE 50 MCG: 50 INJECTION, SOLUTION INTRAMUSCULAR; INTRAVENOUS at 10:00

## 2018-07-15 RX ADMIN — CLINDAMYCIN IN 5 PERCENT DEXTROSE 900 MG: 18 INJECTION, SOLUTION INTRAVENOUS at 09:24

## 2018-07-15 RX ADMIN — HYDROMORPHONE HYDROCHLORIDE 1 MG: 1 INJECTION, SOLUTION INTRAMUSCULAR; INTRAVENOUS; SUBCUTANEOUS at 14:15

## 2018-07-15 RX ADMIN — ONDANSETRON HYDROCHLORIDE 4 MG: 2 INJECTION, SOLUTION INTRAMUSCULAR; INTRAVENOUS at 08:21

## 2018-07-15 RX ADMIN — OXYCODONE HYDROCHLORIDE AND ACETAMINOPHEN 1 TABLET: 5; 325 TABLET ORAL at 11:57

## 2018-07-15 RX ADMIN — SODIUM CHLORIDE, POTASSIUM CHLORIDE, SODIUM LACTATE AND CALCIUM CHLORIDE: 600; 310; 30; 20 INJECTION, SOLUTION INTRAVENOUS at 08:00

## 2018-07-15 RX ADMIN — LACTOBACILLUS TAB 1 TABLET: TAB at 21:32

## 2018-07-15 RX ADMIN — FENTANYL CITRATE 50 MCG: 50 INJECTION, SOLUTION INTRAMUSCULAR; INTRAVENOUS at 09:50

## 2018-07-15 RX ADMIN — HYDROMORPHONE HYDROCHLORIDE 0.5 MG: 1 INJECTION, SOLUTION INTRAMUSCULAR; INTRAVENOUS; SUBCUTANEOUS at 10:20

## 2018-07-15 RX ADMIN — SODIUM CHLORIDE, POTASSIUM CHLORIDE, SODIUM LACTATE AND CALCIUM CHLORIDE: 600; 310; 30; 20 INJECTION, SOLUTION INTRAVENOUS at 08:07

## 2018-07-15 RX ADMIN — PROPOFOL 200 MG: 10 INJECTION, EMULSION INTRAVENOUS at 08:12

## 2018-07-15 RX ADMIN — DEXAMETHASONE SODIUM PHOSPHATE 10 MG: 10 INJECTION INTRAMUSCULAR; INTRAVENOUS at 08:21

## 2018-07-15 RX ADMIN — CLINDAMYCIN IN 5 PERCENT DEXTROSE 900 MG: 18 INJECTION, SOLUTION INTRAVENOUS at 22:54

## 2018-07-15 RX ADMIN — MORPHINE SULFATE 2 MG: 2 INJECTION, SOLUTION INTRAMUSCULAR; INTRAVENOUS at 03:57

## 2018-07-15 RX ADMIN — PROPOFOL 60 MG: 10 INJECTION, EMULSION INTRAVENOUS at 09:08

## 2018-07-15 RX ADMIN — SODIUM CHLORIDE, SODIUM LACTATE, POTASSIUM CHLORIDE, CALCIUM CHLORIDE AND DEXTROSE MONOHYDRATE: 5; 600; 310; 30; 20 INJECTION, SOLUTION INTRAVENOUS at 11:57

## 2018-07-15 RX ADMIN — SODIUM CHLORIDE, POTASSIUM CHLORIDE, SODIUM LACTATE AND CALCIUM CHLORIDE: 600; 310; 30; 20 INJECTION, SOLUTION INTRAVENOUS at 08:51

## 2018-07-15 RX ADMIN — HYDROMORPHONE HYDROCHLORIDE 1 MG: 1 INJECTION, SOLUTION INTRAMUSCULAR; INTRAVENOUS; SUBCUTANEOUS at 18:31

## 2018-07-15 RX ADMIN — VANCOMYCIN HYDROCHLORIDE 1000 MG: 1 INJECTION, SOLUTION INTRAVENOUS at 01:56

## 2018-07-15 RX ADMIN — Medication 100 MG: at 08:12

## 2018-07-15 RX ADMIN — OXYCODONE HYDROCHLORIDE AND ACETAMINOPHEN 1 TABLET: 5; 325 TABLET ORAL at 00:11

## 2018-07-15 RX ADMIN — HYDROMORPHONE HYDROCHLORIDE 1 MG: 1 INJECTION, SOLUTION INTRAMUSCULAR; INTRAVENOUS; SUBCUTANEOUS at 22:54

## 2018-07-15 RX ADMIN — CLINDAMYCIN IN 5 PERCENT DEXTROSE 900 MG: 18 INJECTION, SOLUTION INTRAVENOUS at 16:48

## 2018-07-15 RX ADMIN — VANCOMYCIN HYDROCHLORIDE 1000 MG: 1 INJECTION, SOLUTION INTRAVENOUS at 14:14

## 2018-07-15 RX ADMIN — HYDROMORPHONE HYDROCHLORIDE 0.5 MG: 1 INJECTION, SOLUTION INTRAMUSCULAR; INTRAVENOUS; SUBCUTANEOUS at 10:10

## 2018-07-15 RX ADMIN — FENTANYL CITRATE 50 MCG: 50 INJECTION, SOLUTION INTRAMUSCULAR; INTRAVENOUS at 08:12

## 2018-07-15 RX ADMIN — CLINDAMYCIN IN 5 PERCENT DEXTROSE 900 MG: 18 INJECTION, SOLUTION INTRAVENOUS at 02:51

## 2018-07-15 RX ADMIN — OXYCODONE HYDROCHLORIDE AND ACETAMINOPHEN 1 TABLET: 5; 325 TABLET ORAL at 16:49

## 2018-07-15 ASSESSMENT — PULMONARY FUNCTION TESTS
PIF_VALUE: 22
PIF_VALUE: 25
PIF_VALUE: 24
PIF_VALUE: 2
PIF_VALUE: 0
PIF_VALUE: 24
PIF_VALUE: 24
PIF_VALUE: 26
PIF_VALUE: 25
PIF_VALUE: 26
PIF_VALUE: 25
PIF_VALUE: 25
PIF_VALUE: 34
PIF_VALUE: 24
PIF_VALUE: 25
PIF_VALUE: 24
PIF_VALUE: 25
PIF_VALUE: 27
PIF_VALUE: 30
PIF_VALUE: 25
PIF_VALUE: 24
PIF_VALUE: 24
PIF_VALUE: 25
PIF_VALUE: 29
PIF_VALUE: 25
PIF_VALUE: 25
PIF_VALUE: 3
PIF_VALUE: 24
PIF_VALUE: 27
PIF_VALUE: 1
PIF_VALUE: 26
PIF_VALUE: 36
PIF_VALUE: 24
PIF_VALUE: 26
PIF_VALUE: 25
PIF_VALUE: 25
PIF_VALUE: 24
PIF_VALUE: 23
PIF_VALUE: 22
PIF_VALUE: 7
PIF_VALUE: 41
PIF_VALUE: 24
PIF_VALUE: 39
PIF_VALUE: 24
PIF_VALUE: 5
PIF_VALUE: 11
PIF_VALUE: 25
PIF_VALUE: 10
PIF_VALUE: 32
PIF_VALUE: 26
PIF_VALUE: 24
PIF_VALUE: 24
PIF_VALUE: 25
PIF_VALUE: 24
PIF_VALUE: 4
PIF_VALUE: 27
PIF_VALUE: 24
PIF_VALUE: 26
PIF_VALUE: 33
PIF_VALUE: 24
PIF_VALUE: 27
PIF_VALUE: 26
PIF_VALUE: 24
PIF_VALUE: 24
PIF_VALUE: 25
PIF_VALUE: 27
PIF_VALUE: 4
PIF_VALUE: 35
PIF_VALUE: 10
PIF_VALUE: 33
PIF_VALUE: 26
PIF_VALUE: 22
PIF_VALUE: 26
PIF_VALUE: 24
PIF_VALUE: 24
PIF_VALUE: 23
PIF_VALUE: 2
PIF_VALUE: 24
PIF_VALUE: 25
PIF_VALUE: 24
PIF_VALUE: 25
PIF_VALUE: 25
PIF_VALUE: 34
PIF_VALUE: 24
PIF_VALUE: 3
PIF_VALUE: 25
PIF_VALUE: 24
PIF_VALUE: 25

## 2018-07-15 ASSESSMENT — PAIN SCALES - GENERAL
PAINLEVEL_OUTOF10: 10
PAINLEVEL_OUTOF10: 9
PAINLEVEL_OUTOF10: 10
PAINLEVEL_OUTOF10: 10
PAINLEVEL_OUTOF10: 9
PAINLEVEL_OUTOF10: 10
PAINLEVEL_OUTOF10: 9
PAINLEVEL_OUTOF10: 10
PAINLEVEL_OUTOF10: 10
PAINLEVEL_OUTOF10: 5
PAINLEVEL_OUTOF10: 10
PAINLEVEL_OUTOF10: 9
PAINLEVEL_OUTOF10: 10
PAINLEVEL_OUTOF10: 5

## 2018-07-15 NOTE — ANESTHESIA POSTPROCEDURE EVALUATION
Department of Anesthesiology  Postprocedure Note    Patient: Aria Morales  MRN: [id number]  YOB: 1975  Date of evaluation: 7/15/2018  Time:  9:45 AM     Procedure Summary     Date:  07/15/18 [facility] / [facility]    Anesthesia Start:  0807 Anesthesia Stop:      Procedure:  DENTAL EXTRACTION (N/A Mouth) Diagnosis:  (Mandibular Abscess)    Surgeon:  Kody Johnson DDS Responsible Provider:  Joshua Christy DO    Anesthesia Type:  general ASA Status:  2          Anesthesia Type: general    Fam Phase I: Fam Score: 10    Fam Phase II: Fam Score: 10    Last vitals: Reviewed and per EMR flowsheets.        Anesthesia Post Evaluation    Patient location during evaluation: bedside  Patient participation: complete - patient participated  Level of consciousness: awake and awake and alert  Pain score: 2  Airway patency: patent  Nausea & Vomiting: no nausea and no vomiting  Complications: no  Cardiovascular status: blood pressure returned to baseline and hemodynamically stable  Respiratory status: acceptable  Hydration status: euvolemic

## 2018-07-15 NOTE — PROGRESS NOTES
Patient has been receiving pain medication throughout the night to manage pain dental abscess. Patient has been resting comfortably throughout the night. Patient got bathed and cleaned up for surgery this morning. Will continue to monitor patient.

## 2018-07-15 NOTE — PROGRESS NOTES
07/13/18   0536  07/14/18   0517  07/15/18   0508   WBC  8.2  6.4  7.7   HGB  8.5*  8.3*  9.8*   HCT  26.7*  26.8*  31.1*   PLT  229  244  296     Recent Labs      07/13/18   0536  07/14/18   0517  07/15/18   0508   NA  139  141  140   K  3.3*  3.1*  3.7   CL  103  105  101   CO2  27  25  24   BUN  4*  6  7   CREATININE  0.57  0.49*  0.52   CALCIUM  8.2*  8.4*  8.8     No results for input(s): AST, ALT, BILIDIR, BILITOT, ALKPHOS in the last 72 hours. No results for input(s): INR in the last 72 hours. No results for input(s): Kylah Sargent in the last 72 hours. Urinalysis:    No results found for: Joshua Arnt, BACTERIA, RBCUA, BLOODU, [address]    Radiology:  CT MAXILLOFACIAL W CONTRAST   Final Result      THERE IS DIFFUSE SUBCUTANEOUS STRANDING, INFLAMMATORY CHANGES OVERLYING THE LEFT PERIMANDIBULAR REGION. THERE IS ASSOCIATED ABSCESS SUBJACENT TO THE LEFT MANDIBLE AND REGIONS OF TOOTH NUMBER APPROXIMATELY 19 THROUGH 22. THERE IS SOME ASSOCIATED DENTAL    CARIES. FINDINGS MOST LIKELY REPRESENT ODONTOGENIC ABSCESS IN ORIGIN. Other findings detailed above      All CT scans at this facility use dose modulation, iterative reconstruction, and/or weight based dosing when appropriate to reduce radiation dose to as low as reasonably achievable. XR MANDIBLE (MIN 4 VIEWS)    (Results Pending)           Assessment/Plan:    Active Hospital Problems    Diagnosis Date Noted    Facial cellulitis [K98.268]     Dental abscess [K04.7] 07/09/2018     1. Dental Abscess with fever,tachycardia and leukocytosis  -  s/p extraction of tooth 19 on 7/12  - discussed with oral surgeon who is concerned regarding further propagation of her abscess, IV clindamycin increased, repeat imaging ordered,   - s/p extraction of tooth 18 and I&D of abscess of symphysis of mandible  - still having a lot of pain,switched to IV dilaudid  -  ID also following    2.  Acute posthemorrhagic/chronic microcytic anemia  - Hb improved s/p transfusion of 2 units of PRBCs  - monitor H/H     3.  Hypokalemia  -replaced     Diet: Diet NPO, After Midnight    Code Status: Full Code               Electronically signed by Yulia Fleming MD on 7/15/2018 at 9:49 AM

## 2018-07-15 NOTE — ANESTHESIA PRE PROCEDURE
Department of Anesthesiology  Preprocedure Note       Name:  Roldan Jim   Age:  37 y.o.  :  1975                                          MRN:  [id number]         Date:  7/15/2018      Surgeon: Bong Ya):  Dusty Friedman DDS    Procedure: Procedure(s):  DENTAL EXTRACTION    Medications prior to admission:   Prior to Admission medications    Medication Sig Start Date End Date Taking?  Authorizing Provider   amoxicillin-clavulanate (AUGMENTIN) 875-125 MG per tablet Take 1 tablet by mouth 2 times daily for 10 days 18 Yes Kamryn Pérez MD   ranitidine (ZANTAC) 150 MG tablet Take 150 mg by mouth daily   Yes Historical Provider, MD       Current medications:    Current Facility-Administered Medications   Medication Dose Route Frequency Provider Last Rate Last Dose    lactobacillus acidophilus [facility]) 1 tablet  1 tablet Oral BID Aisha Dakins Abbud, MD   1 tablet at 18    clindamycin (CLEOCIN) 900 mg in dextrose 5 % 50 mL IVPB  900 mg Intravenous Q6H Dusty Friedman DDS   Stopped at 07/15/18 0359    morphine injection 2 mg  2 mg Intravenous Q4H PRN Kerrie Alfaro MD   2 mg at 07/15/18 0357    vancomycin (VANCOCIN) 1000 mg in dextrose 5% 200 mL IVPB  1,000 mg Intravenous Q12H Margie Scales DO   Stopped at 07/15/18 0256    oxyCODONE-acetaminophen (PERCOCET) 5-325 MG per tablet 1 tablet  1 tablet Oral Q4H PRN Kerrie Alfaro MD   1 tablet at 07/15/18 0011    acetaminophen (TYLENOL) tablet 650 mg  650 mg Oral Q4H PRN Marbella Aviles PA-C   650 mg at 07/10/18 1613    sodium chloride flush 0.9 % injection 10 mL  10 mL Intravenous 2 times per day Mortimer Deputy, APRN - CNP   10 mL at 18    sodium chloride flush 0.9 % injection 10 mL  10 mL Intravenous PRN Mortimer Deputy, APRN - CNP        magnesium hydroxide (MILK OF MAGNESIA) 400 MG/5ML suspension 30 mL  30 mL Oral Daily PRN Mortimer Deputy, APRN - CNP   30 mL at 18    ondansetron (ZOFRAN) injection Davian,    7/15/2018

## 2018-07-16 LAB
ABO/RH: NORMAL
ANION GAP SERPL CALCULATED.3IONS-SCNC: 11 MEQ/L (ref 7–13)
ANISOCYTOSIS: ABNORMAL
ANTIBODY SCREEN: NORMAL
BASOPHILS ABSOLUTE: 0 K/UL (ref 0–0.2)
BASOPHILS RELATIVE PERCENT: 0.5 %
BUN BLDV-MCNC: 6 MG/DL (ref 6–20)
CALCIUM SERPL-MCNC: 8.5 MG/DL (ref 8.6–10.2)
CHLORIDE BLD-SCNC: 102 MEQ/L (ref 98–107)
CO2: 26 MEQ/L (ref 22–29)
CREAT SERPL-MCNC: 0.58 MG/DL (ref 0.5–0.9)
EOSINOPHILS ABSOLUTE: 0 K/UL (ref 0–0.7)
EOSINOPHILS RELATIVE PERCENT: 0.5 %
GFR AFRICAN AMERICAN: >60
GFR NON-AFRICAN AMERICAN: >60
GLUCOSE BLD-MCNC: 105 MG/DL (ref 74–109)
HCT VFR BLD CALC: 25 % (ref 37–47)
HEMOGLOBIN: 7.9 G/DL (ref 12–16)
HYPOCHROMIA: ABNORMAL
LYMPHOCYTES ABSOLUTE: 1.8 K/UL (ref 1–4.8)
LYMPHOCYTES RELATIVE PERCENT: 21 %
MCH RBC QN AUTO: 20.6 PG (ref 27–31.3)
MCHC RBC AUTO-ENTMCNC: 31.8 % (ref 33–37)
MCV RBC AUTO: 64.9 FL (ref 82–100)
METAMYELOCYTES RELATIVE PERCENT: 1 %
MICROCYTES: ABNORMAL
MONOCYTES ABSOLUTE: 0.5 K/UL (ref 0.2–0.8)
MONOCYTES RELATIVE PERCENT: 5.8 %
MYELOCYTE PERCENT: 2 %
NEUTROPHILS ABSOLUTE: 6.4 K/UL (ref 1.4–6.5)
NEUTROPHILS RELATIVE PERCENT: 70 %
PDW BLD-RTO: 25.6 % (ref 11.5–14.5)
PLATELET # BLD: 286 K/UL (ref 130–400)
PLATELET SLIDE REVIEW: NORMAL
POTASSIUM SERPL-SCNC: 3.5 MEQ/L (ref 3.5–5.1)
RBC # BLD: 3.85 M/UL (ref 4.2–5.4)
SMUDGE CELLS: 1
SODIUM BLD-SCNC: 139 MEQ/L (ref 132–144)
WBC # BLD: 8.8 K/UL (ref 4.8–10.8)

## 2018-07-16 PROCEDURE — 6360000002 HC RX W HCPCS: Performed by: INTERNAL MEDICINE

## 2018-07-16 PROCEDURE — 2580000003 HC RX 258: Performed by: NURSE PRACTITIONER

## 2018-07-16 PROCEDURE — 99232 SBSQ HOSP IP/OBS MODERATE 35: CPT | Performed by: INTERNAL MEDICINE

## 2018-07-16 PROCEDURE — 85025 COMPLETE CBC W/AUTO DIFF WBC: CPT

## 2018-07-16 PROCEDURE — 6360000002 HC RX W HCPCS: Performed by: DENTIST

## 2018-07-16 PROCEDURE — 6370000000 HC RX 637 (ALT 250 FOR IP): Performed by: INTERNAL MEDICINE

## 2018-07-16 PROCEDURE — 2500000003 HC RX 250 WO HCPCS: Performed by: DENTIST

## 2018-07-16 PROCEDURE — 86923 COMPATIBILITY TEST ELECTRIC: CPT

## 2018-07-16 PROCEDURE — 36430 TRANSFUSION BLD/BLD COMPNT: CPT

## 2018-07-16 PROCEDURE — 86901 BLOOD TYPING SEROLOGIC RH(D): CPT

## 2018-07-16 PROCEDURE — P9016 RBC LEUKOCYTES REDUCED: HCPCS

## 2018-07-16 PROCEDURE — 2580000003 HC RX 258: Performed by: DENTIST

## 2018-07-16 PROCEDURE — 86900 BLOOD TYPING SEROLOGIC ABO: CPT

## 2018-07-16 PROCEDURE — 86850 RBC ANTIBODY SCREEN: CPT

## 2018-07-16 PROCEDURE — 6370000000 HC RX 637 (ALT 250 FOR IP): Performed by: DENTIST

## 2018-07-16 PROCEDURE — 2580000003 HC RX 258: Performed by: INTERNAL MEDICINE

## 2018-07-16 PROCEDURE — 36415 COLL VENOUS BLD VENIPUNCTURE: CPT

## 2018-07-16 PROCEDURE — 1210000000 HC MED SURG R&B

## 2018-07-16 PROCEDURE — 80048 BASIC METABOLIC PNL TOTAL CA: CPT

## 2018-07-16 RX ORDER — FERROUS SULFATE 325(65) MG
325 TABLET ORAL 2 TIMES DAILY WITH MEALS
Status: DISCONTINUED | OUTPATIENT
Start: 2018-07-16 | End: 2018-07-21 | Stop reason: HOSPADM

## 2018-07-16 RX ORDER — SODIUM CHLORIDE 0.9 % (FLUSH) 0.9 %
10 SYRINGE (ML) INJECTION EVERY 12 HOURS SCHEDULED
Status: DISCONTINUED | OUTPATIENT
Start: 2018-07-16 | End: 2018-07-16

## 2018-07-16 RX ORDER — SODIUM CHLORIDE 0.9 % (FLUSH) 0.9 %
10 SYRINGE (ML) INJECTION PRN
Status: DISCONTINUED | OUTPATIENT
Start: 2018-07-16 | End: 2018-07-16

## 2018-07-16 RX ORDER — LIDOCAINE HYDROCHLORIDE 20 MG/ML
5 INJECTION, SOLUTION INFILTRATION; PERINEURAL ONCE
Status: COMPLETED | OUTPATIENT
Start: 2018-07-16 | End: 2018-07-17

## 2018-07-16 RX ORDER — SODIUM CHLORIDE 9 MG/ML
250 INJECTION, SOLUTION INTRAVENOUS ONCE
Status: COMPLETED | OUTPATIENT
Start: 2018-07-16 | End: 2018-07-16

## 2018-07-16 RX ORDER — 0.9 % SODIUM CHLORIDE 0.9 %
250 INTRAVENOUS SOLUTION INTRAVENOUS ONCE
Status: COMPLETED | OUTPATIENT
Start: 2018-07-16 | End: 2018-07-18

## 2018-07-16 RX ADMIN — SODIUM CHLORIDE, SODIUM LACTATE, POTASSIUM CHLORIDE, CALCIUM CHLORIDE AND DEXTROSE MONOHYDRATE: 5; 600; 310; 30; 20 INJECTION, SOLUTION INTRAVENOUS at 06:00

## 2018-07-16 RX ADMIN — LACTOBACILLUS TAB 1 TABLET: TAB at 10:48

## 2018-07-16 RX ADMIN — Medication 10 ML: at 22:27

## 2018-07-16 RX ADMIN — HYDROMORPHONE HYDROCHLORIDE 1 MG: 1 INJECTION, SOLUTION INTRAMUSCULAR; INTRAVENOUS; SUBCUTANEOUS at 17:08

## 2018-07-16 RX ADMIN — VANCOMYCIN HYDROCHLORIDE 1000 MG: 1 INJECTION, SOLUTION INTRAVENOUS at 02:20

## 2018-07-16 RX ADMIN — HYDROMORPHONE HYDROCHLORIDE 1 MG: 1 INJECTION, SOLUTION INTRAMUSCULAR; INTRAVENOUS; SUBCUTANEOUS at 22:27

## 2018-07-16 RX ADMIN — IBUPROFEN 800 MG: 800 TABLET, FILM COATED ORAL at 10:48

## 2018-07-16 RX ADMIN — FERROUS SULFATE TAB 325 MG (65 MG ELEMENTAL FE) 325 MG: 325 (65 FE) TAB at 10:48

## 2018-07-16 RX ADMIN — OXYCODONE HYDROCHLORIDE AND ACETAMINOPHEN 1 TABLET: 5; 325 TABLET ORAL at 02:25

## 2018-07-16 RX ADMIN — FERROUS SULFATE TAB 325 MG (65 MG ELEMENTAL FE) 325 MG: 325 (65 FE) TAB at 22:27

## 2018-07-16 RX ADMIN — SODIUM CHLORIDE 250 ML: 9 INJECTION, SOLUTION INTRAVENOUS at 22:28

## 2018-07-16 RX ADMIN — OXYCODONE HYDROCHLORIDE AND ACETAMINOPHEN 1 TABLET: 5; 325 TABLET ORAL at 23:44

## 2018-07-16 RX ADMIN — CLINDAMYCIN IN 5 PERCENT DEXTROSE 900 MG: 18 INJECTION, SOLUTION INTRAVENOUS at 04:56

## 2018-07-16 RX ADMIN — LACTOBACILLUS TAB 1 TABLET: TAB at 22:27

## 2018-07-16 RX ADMIN — PIPERACILLIN SODIUM AND TAZOBACTAM SODIUM 3.38 G: 3; .375 INJECTION, POWDER, LYOPHILIZED, FOR SOLUTION INTRAVENOUS at 22:28

## 2018-07-16 RX ADMIN — VANCOMYCIN HYDROCHLORIDE 1000 MG: 1 INJECTION, SOLUTION INTRAVENOUS at 23:44

## 2018-07-16 RX ADMIN — OXYCODONE HYDROCHLORIDE AND ACETAMINOPHEN 1 TABLET: 5; 325 TABLET ORAL at 10:49

## 2018-07-16 RX ADMIN — HYDROMORPHONE HYDROCHLORIDE 1 MG: 1 INJECTION, SOLUTION INTRAMUSCULAR; INTRAVENOUS; SUBCUTANEOUS at 04:56

## 2018-07-16 RX ADMIN — CLINDAMYCIN IN 5 PERCENT DEXTROSE 900 MG: 18 INJECTION, SOLUTION INTRAVENOUS at 10:49

## 2018-07-16 ASSESSMENT — PAIN SCALES - GENERAL
PAINLEVEL_OUTOF10: 10

## 2018-07-16 NOTE — FLOWSHEET NOTE
Chin area is swollen but less swollen than this morning. Dilaudid or Percocet available for pain. DR. Ilan Diaz made aware of HGB drop. Per Dr. Brady Massey, changing regimen to Zosyn IV. For PICC Line tomorrow. Blood transfusion 1/2 units. Patient is not in distress.

## 2018-07-17 ENCOUNTER — APPOINTMENT (OUTPATIENT)
Dept: INTERVENTIONAL RADIOLOGY/VASCULAR | Age: 43
DRG: 098 | End: 2018-07-17
Payer: COMMERCIAL

## 2018-07-17 PROBLEM — R51.9 FACIAL PAIN, ACUTE: Status: ACTIVE | Noted: 2018-07-17

## 2018-07-17 LAB
ANION GAP SERPL CALCULATED.3IONS-SCNC: 10 MEQ/L (ref 7–13)
BASOPHILS ABSOLUTE: 0.1 K/UL (ref 0–0.2)
BASOPHILS RELATIVE PERCENT: 1.2 %
BLOOD BANK DISPENSE STATUS: NORMAL
BLOOD BANK DISPENSE STATUS: NORMAL
BLOOD BANK PRODUCT CODE: NORMAL
BLOOD BANK PRODUCT CODE: NORMAL
BPU ID: NORMAL
BPU ID: NORMAL
BUN BLDV-MCNC: 6 MG/DL (ref 6–20)
CALCIUM SERPL-MCNC: 8.7 MG/DL (ref 8.6–10.2)
CHLORIDE BLD-SCNC: 100 MEQ/L (ref 98–107)
CO2: 30 MEQ/L (ref 22–29)
CREAT SERPL-MCNC: 0.57 MG/DL (ref 0.5–0.9)
DESCRIPTION BLOOD BANK: NORMAL
DESCRIPTION BLOOD BANK: NORMAL
EOSINOPHILS ABSOLUTE: 0.3 K/UL (ref 0–0.7)
EOSINOPHILS RELATIVE PERCENT: 3.6 %
GFR AFRICAN AMERICAN: >60
GFR NON-AFRICAN AMERICAN: >60
GLUCOSE BLD-MCNC: 87 MG/DL (ref 74–109)
HCT VFR BLD CALC: 30.1 % (ref 37–47)
HEMOGLOBIN: 9.8 G/DL (ref 12–16)
LYMPHOCYTES ABSOLUTE: 1.9 K/UL (ref 1–4.8)
LYMPHOCYTES RELATIVE PERCENT: 26.6 %
MCH RBC QN AUTO: 22.3 PG (ref 27–31.3)
MCHC RBC AUTO-ENTMCNC: 32.6 % (ref 33–37)
MCV RBC AUTO: 68.3 FL (ref 82–100)
MONOCYTES ABSOLUTE: 0.5 K/UL (ref 0.2–0.8)
MONOCYTES RELATIVE PERCENT: 7.3 %
NEUTROPHILS ABSOLUTE: 4.4 K/UL (ref 1.4–6.5)
NEUTROPHILS RELATIVE PERCENT: 61.3 %
PDW BLD-RTO: 27.3 % (ref 11.5–14.5)
PLATELET # BLD: 262 K/UL (ref 130–400)
POTASSIUM SERPL-SCNC: 3.7 MEQ/L (ref 3.5–5.1)
RBC # BLD: 4.41 M/UL (ref 4.2–5.4)
SODIUM BLD-SCNC: 140 MEQ/L (ref 132–144)
WBC # BLD: 7.2 K/UL (ref 4.8–10.8)

## 2018-07-17 PROCEDURE — 1210000000 HC MED SURG R&B

## 2018-07-17 PROCEDURE — 36415 COLL VENOUS BLD VENIPUNCTURE: CPT

## 2018-07-17 PROCEDURE — 6370000000 HC RX 637 (ALT 250 FOR IP): Performed by: INTERNAL MEDICINE

## 2018-07-17 PROCEDURE — 6360000002 HC RX W HCPCS: Performed by: DENTIST

## 2018-07-17 PROCEDURE — S0028 INJECTION, FAMOTIDINE, 20 MG: HCPCS | Performed by: INTERNAL MEDICINE

## 2018-07-17 PROCEDURE — 77001 FLUOROGUIDE FOR VEIN DEVICE: CPT | Performed by: RADIOLOGY

## 2018-07-17 PROCEDURE — 02HV33Z INSERTION OF INFUSION DEVICE INTO SUPERIOR VENA CAVA, PERCUTANEOUS APPROACH: ICD-10-PCS | Performed by: RADIOLOGY

## 2018-07-17 PROCEDURE — 6360000002 HC RX W HCPCS: Performed by: NURSE PRACTITIONER

## 2018-07-17 PROCEDURE — 2580000003 HC RX 258: Performed by: NURSE PRACTITIONER

## 2018-07-17 PROCEDURE — 6370000000 HC RX 637 (ALT 250 FOR IP): Performed by: DENTIST

## 2018-07-17 PROCEDURE — 2580000003 HC RX 258: Performed by: INTERNAL MEDICINE

## 2018-07-17 PROCEDURE — 6360000002 HC RX W HCPCS: Performed by: INTERNAL MEDICINE

## 2018-07-17 PROCEDURE — 36569 INSJ PICC 5 YR+ W/O IMAGING: CPT | Performed by: RADIOLOGY

## 2018-07-17 PROCEDURE — 76937 US GUIDE VASCULAR ACCESS: CPT | Performed by: RADIOLOGY

## 2018-07-17 PROCEDURE — 2500000003 HC RX 250 WO HCPCS: Performed by: INTERNAL MEDICINE

## 2018-07-17 PROCEDURE — 80048 BASIC METABOLIC PNL TOTAL CA: CPT

## 2018-07-17 PROCEDURE — 2580000003 HC RX 258

## 2018-07-17 PROCEDURE — C1751 CATH, INF, PER/CENT/MIDLINE: HCPCS

## 2018-07-17 PROCEDURE — 93005 ELECTROCARDIOGRAM TRACING: CPT

## 2018-07-17 PROCEDURE — 2580000003 HC RX 258: Performed by: DENTIST

## 2018-07-17 PROCEDURE — P9016 RBC LEUKOCYTES REDUCED: HCPCS

## 2018-07-17 PROCEDURE — 36430 TRANSFUSION BLD/BLD COMPNT: CPT

## 2018-07-17 PROCEDURE — 85025 COMPLETE CBC W/AUTO DIFF WBC: CPT

## 2018-07-17 RX ORDER — KETOROLAC TROMETHAMINE 15 MG/ML
15 INJECTION, SOLUTION INTRAMUSCULAR; INTRAVENOUS EVERY 6 HOURS PRN
Status: DISCONTINUED | OUTPATIENT
Start: 2018-07-17 | End: 2018-07-21 | Stop reason: HOSPADM

## 2018-07-17 RX ORDER — SODIUM CHLORIDE 9 MG/ML
INJECTION, SOLUTION INTRAVENOUS
Status: COMPLETED
Start: 2018-07-17 | End: 2018-07-17

## 2018-07-17 RX ORDER — CALCIUM CARBONATE 200(500)MG
500 TABLET,CHEWABLE ORAL 2 TIMES DAILY PRN
Status: DISCONTINUED | OUTPATIENT
Start: 2018-07-17 | End: 2018-07-21 | Stop reason: HOSPADM

## 2018-07-17 RX ADMIN — ONDANSETRON HYDROCHLORIDE 4 MG: 2 INJECTION, SOLUTION INTRAMUSCULAR; INTRAVENOUS at 21:38

## 2018-07-17 RX ADMIN — OXYCODONE HYDROCHLORIDE AND ACETAMINOPHEN 1 TABLET: 5; 325 TABLET ORAL at 06:15

## 2018-07-17 RX ADMIN — KETOROLAC TROMETHAMINE 15 MG: 15 INJECTION, SOLUTION INTRAMUSCULAR; INTRAVENOUS at 23:54

## 2018-07-17 RX ADMIN — SODIUM CHLORIDE 250 ML: 9 INJECTION, SOLUTION INTRAVENOUS at 11:30

## 2018-07-17 RX ADMIN — IBUPROFEN 800 MG: 800 TABLET, FILM COATED ORAL at 00:48

## 2018-07-17 RX ADMIN — PIPERACILLIN SODIUM AND TAZOBACTAM SODIUM 3.38 G: 3; .375 INJECTION, POWDER, LYOPHILIZED, FOR SOLUTION INTRAVENOUS at 06:16

## 2018-07-17 RX ADMIN — HYDROMORPHONE HYDROCHLORIDE 1 MG: 1 INJECTION, SOLUTION INTRAMUSCULAR; INTRAVENOUS; SUBCUTANEOUS at 03:00

## 2018-07-17 RX ADMIN — PIPERACILLIN SODIUM AND TAZOBACTAM SODIUM 3.38 G: 3; .375 INJECTION, POWDER, LYOPHILIZED, FOR SOLUTION INTRAVENOUS at 17:07

## 2018-07-17 RX ADMIN — FERROUS SULFATE TAB 325 MG (65 MG ELEMENTAL FE) 325 MG: 325 (65 FE) TAB at 09:10

## 2018-07-17 RX ADMIN — LACTOBACILLUS TAB 1 TABLET: TAB at 09:10

## 2018-07-17 RX ADMIN — ONDANSETRON HYDROCHLORIDE 4 MG: 2 INJECTION, SOLUTION INTRAMUSCULAR; INTRAVENOUS at 10:49

## 2018-07-17 RX ADMIN — PIPERACILLIN SODIUM AND TAZOBACTAM SODIUM 3.38 G: 3; .375 INJECTION, POWDER, LYOPHILIZED, FOR SOLUTION INTRAVENOUS at 23:54

## 2018-07-17 RX ADMIN — FAMOTIDINE 20 MG: 10 INJECTION INTRAVENOUS at 21:38

## 2018-07-17 RX ADMIN — FERROUS SULFATE TAB 325 MG (65 MG ELEMENTAL FE) 325 MG: 325 (65 FE) TAB at 17:06

## 2018-07-17 RX ADMIN — PIPERACILLIN SODIUM AND TAZOBACTAM SODIUM 3.38 G: 3; .375 INJECTION, POWDER, LYOPHILIZED, FOR SOLUTION INTRAVENOUS at 12:28

## 2018-07-17 RX ADMIN — LIDOCAINE HYDROCHLORIDE 5 ML: 20 INJECTION, SOLUTION INFILTRATION; PERINEURAL at 11:30

## 2018-07-17 RX ADMIN — OXYCODONE HYDROCHLORIDE AND ACETAMINOPHEN 1 TABLET: 5; 325 TABLET ORAL at 17:06

## 2018-07-17 RX ADMIN — SODIUM CHLORIDE, SODIUM LACTATE, POTASSIUM CHLORIDE, CALCIUM CHLORIDE AND DEXTROSE MONOHYDRATE: 5; 600; 310; 30; 20 INJECTION, SOLUTION INTRAVENOUS at 12:28

## 2018-07-17 RX ADMIN — OXYCODONE HYDROCHLORIDE AND ACETAMINOPHEN 1 TABLET: 5; 325 TABLET ORAL at 21:48

## 2018-07-17 RX ADMIN — Medication 10 ML: at 21:38

## 2018-07-17 RX ADMIN — VANCOMYCIN HYDROCHLORIDE 1000 MG: 1 INJECTION, SOLUTION INTRAVENOUS at 12:28

## 2018-07-17 RX ADMIN — SODIUM CHLORIDE: 9 INJECTION, SOLUTION INTRAVENOUS at 03:15

## 2018-07-17 RX ADMIN — LACTOBACILLUS TAB 1 TABLET: TAB at 21:38

## 2018-07-17 RX ADMIN — ANTACID TABLETS 500 MG: 500 TABLET, CHEWABLE ORAL at 03:54

## 2018-07-17 RX ADMIN — HYDROMORPHONE HYDROCHLORIDE 1 MG: 1 INJECTION, SOLUTION INTRAMUSCULAR; INTRAVENOUS; SUBCUTANEOUS at 14:48

## 2018-07-17 RX ADMIN — OXYCODONE HYDROCHLORIDE AND ACETAMINOPHEN 1 TABLET: 5; 325 TABLET ORAL at 12:26

## 2018-07-17 RX ADMIN — HYDROMORPHONE HYDROCHLORIDE 1 MG: 1 INJECTION, SOLUTION INTRAMUSCULAR; INTRAVENOUS; SUBCUTANEOUS at 19:23

## 2018-07-17 RX ADMIN — Medication 10 ML: at 09:10

## 2018-07-17 RX ADMIN — FAMOTIDINE 20 MG: 10 INJECTION INTRAVENOUS at 09:10

## 2018-07-17 RX ADMIN — HYDROMORPHONE HYDROCHLORIDE 1 MG: 1 INJECTION, SOLUTION INTRAMUSCULAR; INTRAVENOUS; SUBCUTANEOUS at 09:10

## 2018-07-17 ASSESSMENT — PAIN SCALES - GENERAL
PAINLEVEL_OUTOF10: 10
PAINLEVEL_OUTOF10: 8
PAINLEVEL_OUTOF10: 8
PAINLEVEL_OUTOF10: 9
PAINLEVEL_OUTOF10: 8
PAINLEVEL_OUTOF10: 0
PAINLEVEL_OUTOF10: 10
PAINLEVEL_OUTOF10: 8
PAINLEVEL_OUTOF10: 10

## 2018-07-17 ASSESSMENT — PAIN DESCRIPTION - DESCRIPTORS
DESCRIPTORS: ACHING
DESCRIPTORS: ACHING

## 2018-07-17 ASSESSMENT — PAIN DESCRIPTION - PAIN TYPE: TYPE: ACUTE PAIN;SURGICAL PAIN

## 2018-07-17 ASSESSMENT — PAIN DESCRIPTION - ORIENTATION
ORIENTATION: LEFT

## 2018-07-17 ASSESSMENT — PAIN DESCRIPTION - LOCATION
LOCATION: JAW

## 2018-07-17 NOTE — PROGRESS NOTES
Hospitalist  Follow-up      Date of Service: 7/17/2018    Subjective:   Patient stated that she still have pain in the left show. She stated that there was no IV help more than ibuprofen in the past.  Able to swallow. No difficulty swallowing or breathing. History reviewed. No pertinent past medical history. family history is not on file. reports that she has never smoked. She has never used smokeless tobacco. She reports that she does not drink alcohol or use drugs. Case DW RN       Objective:  Exam:  BP (!) 132/94   Pulse 78   Temp 98.06 °F (36.7 °C)   Resp 18   Ht 5' 3\" (1.6 m)   Wt 194 lb 14.2 oz (88.4 kg)   LMP 07/05/2018   SpO2 100%   BMI 34.52 kg/m²     Physical Exam:  General appearance: alert, appears stated age and cooperative  Skin: Skin color, texture, normal. No rashes or lesions  HEENT: No nose bleed, headache, vision problems  CV: C/O chest pain, tightness, pressure,   Respiratory: C/o no SOB, ACHARYA, Orthopnea, PND  GI: No abdominal pain, black stool, bloating  Limbs: No c/o edema, pain, swelling, intermittent claudication, joint pains. PICC line is located in the right arm  Neuro: No dizziness, lightheadedness, syncope, gait problems, memory problems  Psych: grossly normal. No SI/depression.     Medications:  Current Facility-Administered Medications   Medication Dose Route Frequency Provider Last Rate Last Dose    famotidine (PEPCID) injection 20 mg  20 mg Intravenous BID Soraya Peter MD   20 mg at 07/17/18 0910    calcium carbonate (TUMS) chewable tablet 500 mg  500 mg Oral BID PRN Soraya Peter MD   500 mg at 07/17/18 0354    ketorolac (TORADOL) injection 15 mg  15 mg Intravenous Q6H PRN Michael Lopes MD        ferrous sulfate tablet 325 mg  325 mg Oral BID  Keerthi Salgado MD   325 mg at 07/17/18 0910    piperacillin-tazobactam (ZOSYN) 3.375 g in dextrose 5 % 50 mL IVPB (mini-bag)  3.375 g Intravenous Q6H Margie Scales DO   Stopped at 07/17/18 1355    lactated ringers infusion   Intravenous Continuous Kentrell Marcelo,  mL/hr at 07/15/18 0800      dextrose 5 % in lactated ringers infusion   Intravenous Continuous Lynnette Curtis,  mL/hr at 07/17/18 1228      oxyCODONE-acetaminophen (PERCOCET) 5-325 MG per tablet 1 tablet  1 tablet Oral Q4H PRN Lynnette Gutierrez, DDS   1 tablet at 07/17/18 1226    HYDROmorphone (DILAUDID) injection 1 mg  1 mg Intravenous Q4H PRN Lynnette Gutierrez, DDS   1 mg at 07/17/18 0910    lactobacillus acidophilus [facility]) 1 tablet  1 tablet Oral BID Evelia Hooper MD   1 tablet at 07/17/18 0910    vancomycin (VANCOCIN) 1000 mg in dextrose 5% 200 mL IVPB  1,000 mg Intravenous Q12H Margie Scales, DO   Stopped at 07/17/18 1355    acetaminophen (TYLENOL) tablet 650 mg  650 mg Oral Q4H PRN Aristides Stokes PA-C   650 mg at 07/10/18 1613    sodium chloride flush 0.9 % injection 10 mL  10 mL Intravenous 2 times per day Mark Milton, APRN - CNP   10 mL at 07/17/18 0910    sodium chloride flush 0.9 % injection 10 mL  10 mL Intravenous PRN Mark Adamsna, APRN - CNP        magnesium hydroxide (MILK OF MAGNESIA) 400 MG/5ML suspension 30 mL  30 mL Oral Daily PRN Edgare Milton, APRN - CNP   30 mL at 07/14/18 2148    ondansetron (ZOFRAN) injection 4 mg  4 mg Intravenous Q6H PRN Wilsonlee Milton, APRN - CNP   4 mg at 07/17/18 1049    enoxaparin (LOVENOX) injection 40 mg  40 mg Subcutaneous Daily Mark Gonzalez, APRN - CNP   40 mg at 07/14/18 0945       Data:  LABS:   Lab Results   Component Value Date     07/17/2018    K 3.7 07/17/2018    K 3.6 07/10/2018     07/17/2018    CO2 30 07/17/2018    BUN 6 07/17/2018    CREATININE 0.57 07/17/2018    GFRAA >60.0 07/17/2018    LABGLOM >60.0 07/17/2018    GLUCOSE 87 07/17/2018    CALCIUM 8.7 07/17/2018     Lab Results   Component Value Date    WBC 7.2 07/17/2018    RBC 4.41 07/17/2018    HGB 9.8 07/17/2018    HCT 30.1 07/17/2018    MCV 68.3 07/17/2018 RDW 27.3 07/17/2018     07/17/2018   No results found for: INR, PROTIME  Organism   Date Value Ref Range Status   07/12/2018 Bacteroides fragilis group (A)  Final       RADIOLOGY:  Ct Maxillofacial W Contrast    Result Date: 7/9/2018  UNENHANCED CT SCAN OF THE MAXILLOFACIAL REGION. CLINICAL HISTORY: History of broken tooth for 2 months. Pain and swelling for one day. COMPARISON: None TECHNIQUE: Multiple unenhanced serial axial images of the of the maxillofacial region with both sagittal and coronal reconstructions was performed following the intravenous menstruation 100 mL of Isovue-370. Markers were placed region of interest.. FINDINGS: The field-of-view visualized basal brain shows no enhancing soft tissue masses vascular abnormalities or malformations The visualized paranasal sinuses, frontal, ethmoid, sphenoid, well aerated. There is mucoperiosteal thickening seen in the right maxillary sinus without air-fluid levels. No retention cyst or polyps. The mastoids are well aerated. Both ostiomeatal units are grossly patent. There are 2 markers noted. One overlies the left parotid and the second overlies approximately left angle of mandible. The parotids show no unusual enhancement pattern. No periparotid inflammatory stranding. No enhancing focal fluid collections. The submandibular glands, and thyroid glands are unremarkable. Small visualized portion of the apices the lungs are grossly unremarkable. The second marker at the angle of mandible is in the approximate region of diffuse soft tissue swelling seen overlying the left perimandibular region. There is a focal fluid collection with peripheral enhancing borders and air seen to the left of the teeth approximately 19 through 22. There are caries noted in some of the teeth. Findings most likely that of a abscess odontogenic in origin measuring 3.0 x 2.5 in the transverse and AP dimension. Both globes are intact.   No significant preseptal or post septal findings. The field-of-view visualized portions of the extracranial vessels are unremarkable. There is adenopathy seen in the levels Ib, 2, 3. There are no acute fractures or focal bony abnormalities. THERE IS DIFFUSE SUBCUTANEOUS STRANDING, INFLAMMATORY CHANGES OVERLYING THE LEFT PERIMANDIBULAR REGION. THERE IS ASSOCIATED ABSCESS SUBJACENT TO THE LEFT MANDIBLE AND REGIONS OF TOOTH NUMBER APPROXIMATELY 19 THROUGH 22. THERE IS SOME ASSOCIATED DENTAL CARIES. FINDINGS MOST LIKELY REPRESENT ODONTOGENIC ABSCESS IN ORIGIN. Other findings detailed above All CT scans at this facility use dose modulation, iterative reconstruction, and/or weight based dosing when appropriate to reduce radiation dose to as low as reasonably achievable. Asesment and Plan :  .  Left internal/mandibular abscess with left face cellulitis status post incision and drainage. Cultures are pending. Patient is on vancomycin and Zosyn per ID recommendations  . Anemia status post blood transfusion. Hemoglobin improved. .  Hypokalemia improved. Recommendations:  May use IV Toradol to try to relieve the pain and works as anti-inflammatory drug. Continue with antibiotic therapy.   ID recommendation and follow pending cultures  May advance diet to soft patient tolerates    Neyda Knight MD    Electronically signed by Neyda Knight MD on 7/17/18 at 2:22 PM

## 2018-07-17 NOTE — CARE COORDINATION
MET WITH PATIENT TO DISCUSS D/C PLANS. WE DISCUSSED THE POSSIBILITY OF LONG TERM IV ANTIBIOTICS. SHE IS AT HOME WITH HER CHILDREN AND HOME CARE WOULD BE THE BETTER PLAN FOR HER. PICC LINE WAS PLACED TODAY. SHE IS CURRENTLY ON IV VANCO AND ZOSYN. AWAITING IV BENEFITS. CARE TEAM TO FOLLOW.  Electronically signed by Melinda Shepard RN on 7/17/2018 at 4:27 PM

## 2018-07-17 NOTE — HOME CARE
IV BENEFIT REQUEST FORM    FAX FROM: [facility]                        [address] [place]    REQUESTED BY: Electronically signed by Pricila Welch RN on 2018 at 3:36 PM                                               RN/C3: PHONE: [phone]    DATE:/TIME OF REQUEST: 18  TIME: 3:36 PM      TO: Keke Gonsalves TO: [phone]    PHONE: [phone]     THIS PATIENT HAS BEEN IDENTIFIED TO POSSIBLY NEED LONG TERM IV'S. PLEASE CHECK INSURANCE COVERAGE FOR THE FOLLOWING PT/DRUGS. PATIENT'S NAME: Alexander Guillaume                              [facility]   PATIENT'S : 1975  PATIENT ADDRESS: [address] [place] [address]  SSN:      PAYOR NAME:  Payor: Matias Monson / Plan: Zoey Peck / Product Type: *No Product type* /     DRUG: ZOSYN                       DOSE: 3/375GM         FREQUENCY: Q 6 HR    DRUG: VACNOMYCIN           DOSE: 1000MG            FREQUENCY:Q12 HR    __________ CHECK HERE IF PT HAS NO INSURANCE AND REQUESTING SELF PAY COST. *IF [facility] IS NOT A PROVIDER FOR THIS PATIENT, PLEASE FORWARD INFO VIA FAX TO [facility] @ [phone],(PHONE NUMBER: [phone]) TO RUN BENEFIT VERIFICATION AND NOTIFY THE ABOVE C3 OF THIS PLAN. (FAX FACE SHEET WITH DEMOGRAPHICS AND INSURANCE INFO WITH THIS FORM.)  PLEASE FAX BENEFIT INFO TO: THE [phone]    This message is intended only for the use of the individual or entity to which it is addressed and may contain information that is privileged, confidential, and exempt from disclosure under applicable law. If the reader of the notice is not intended recipient of the employee/agent responsible for delivering the message to the intended recipient, you are hereby notified than any dissemination, distribution or copying of this communication is strictly prohibited.  Please contact the sender for further instructions on handling the information.

## 2018-07-17 NOTE — CONSULTS
INITIAL CONSULT -  PAIN MANAGEMENT     SERVICE DATE:  7/17/2018   SERVICE TIME:  5:23 PM  Admission date 7/10/2018  REASON FOR CONSULT:  Severe dental pain due to dental abscess  REQUESTING PHYSICIAN:  Paola Nelson MD   PRIMARY CARE PHYSICIAN:  Brady Ferreira MD    Chief Complaint   Patient presents with    Dental Pain      times 2 weeks with N/V starting today        HISTORY OF PRESENT ILLNESS:  Ms. Brii Rangel is a 37 y.o. female who presents for severe progressive dental abscess causing severe intractable facial pain, patient is receiving antibiotic, IV vancomycin and IV Zosyn. Patient reporting persistent dental pain and she is reporting that having difficulty was chewing opening the mouth sleeping on the left side. Patient known to have some problem with her left sided dental several months ago however she has had to assist broken a few weeks ago which has progressed to infection and has operation done by the dentist Dr. Maritza White in the past few days and started on antibiotics. Patient has denied any alcohol or drugs abuse in the past.    Patient is requesting pain medication around the clock was consulted to comanage patient pain therapy    PAIN  ASSESSMENT:    constant, moderate, severe    aching, stabbing and throbbing    pain is perceived as severe (6-8 pain scale)    PAST MEDICAL HISTORY:  History reviewed. No pertinent past medical history. PAST SURGICAL HISTORY:    Past Surgical History:   Procedure Laterality Date    [facility] PROCEDURE Left 7/12/2018    REMOVAL OF TOOTH #18 WITH IRRIGARION OF ABSCESS performed by Scott Mei DDS at [address] [place] [address] N/A 7/15/2018    DENTAL EXTRACTION performed by Scott Mei DDS at [address] [place] [address]:  History reviewed. No pertinent family history.   SOCIAL HISTORY:    Social History     Social History    Marital status: Single     Spouse name: N/A    Number of children: N/A    Years of education: N/A     Occupational History    Not on file. Social History Main Topics    Smoking status: Never Smoker    Smokeless tobacco: Never Used    Alcohol use No    Drug use: No    Sexual activity: Not on file     Other Topics Concern    Not on file     Social History Narrative    No narrative on file     PSYCHOLOGICAL HISTORY: none  MEDICATIONS:  Prescriptions Prior to Admission: ranitidine (ZANTAC) 150 MG tablet, Take 150 mg by mouth daily  [unfilled]    ALLERGIES:  Patient has no known allergies. COMPLETE REVIEW OF SYSTEMS:  As noted in HPI, 12 point ROS reviewed and otherwise negative. OBJECTIVE  PHYSICAL EXAM:  /89   Pulse 80   Temp 98.06 °F (36.7 °C)   Resp 18   Ht 5' 3\" (1.6 m)   Wt 194 lb 14.2 oz (88.4 kg)   LMP 07/05/2018   SpO2 100%   BMI 34.52 kg/m²   Body mass index is 34.52 kg/m².   CONSTITUTIONAL:  Swelling on the left side of face, tender to touch, talking or chewing   EYES:  Lids and lashes normal, pupils equal, round and reactive to light, extra ocular muscles intact, sclera clear, conjunctiva normal  ENT:  Tenderness and swelling on the left face and mandible   NECK:  Left paracervical tenderness on the left side of the neck  BACK:  symmetric and no curvature  LUNGS:  no increased work of breathing and good air exchange  CARDIOVASCULAR:  regular rate and rhythm  ABDOMEN:  No scars, normal bowel sounds, soft, non-distended, non-tender, no masses palpated, no hepatosplenomegally  MUSCULOSKELETAL:  there is no redness, warmth, or swelling of the joints  full range of motion noted  motor strength is 5 out of 5 all extremities bilaterally  tone is normal  NEUROLOGIC:  Mental Status Exam:  Level of Alertness:   awake  Orientation:   person, place, time  Memory:   normal  Fund of Knowledge:  normal  Cranial Nerves:  cranial nerves II-XII are grossly intact  Motor Exam:  Motor exam is symmetrical 5 out of 5 all extremities bilaterally  Sensory:  Sensory intact  SKIN:  no bruising or bleeding    DATA:   Diagnostic tests reviewed for today's visit:    All labs and imaging results reviewed. Lab Results   Component Value Date    WBC 7.2 07/17/2018    HGB 9.8 07/17/2018    HCT 30.1 07/17/2018    MCV 68.3 07/17/2018     07/17/2018     07/17/2018    K 3.7 07/17/2018    K 3.6 07/10/2018     07/17/2018    CO2 30 07/17/2018    BUN 6 07/17/2018    CREATININE 0.57 07/17/2018    CALCIUM 8.7 07/17/2018    ALKPHOS 103 07/09/2018    ALT 16 07/09/2018    AST 16 07/09/2018    BILITOT 0.5 07/09/2018    LABALBU 3.9 07/09/2018     LABS  Recent Labs      07/15/18   0508  07/16/18   0504  07/17/18   0830   WBC  7.7  8.8  7.2   RBC  4.74  3.85*  4.41   HGB  9.8*  7.9*  9.8*   HCT  31.1*  25.0*  30.1*   MCV  65.5*  64.9*  68.3*   MCH  20.6*  20.6*  22.3*   MCHC  31.5*  31.8*  32.6*   RDW  25.8*  25.6*  27.3*   PLT  296  286  262       Recent Labs      07/15/18   0508  07/16/18   0503  07/17/18   0830   NA  140  139  140   K  3.7  3.5  3.7   CL  101  102  100   CO2  24  26  30*   BUN  7  6  6   CREATININE  0.52  0.58  0.57   GLUCOSE  84  105  87   CALCIUM  8.8  8.5*  8.7       No results for input(s): MG in the last 72 hours. IR PICC WO SQ PORT/PUMP > 5 YEARS [[id number]] Collected: 07/17/18 1231      Order Status: Completed Updated: 07/17/18 1234     Narrative:       IR ULTRASOUND GUIDANCE VASCULAR ACCESS, IR PICC WO SQ PORT/PUMP > 5 YEARS, IR FLUORO GUIDED CVA DEVICE PLACEMENT: 7/17/2018    CLINICAL HISTORY:  PICC insertion for IV antibiotics for dental disease and facial cellulitis. Central venous catheter was inserted using a maximal sterile barrier technique which includes cap, mask, sterile gown, sterile gloves, sterile full-body drape, hand hygiene and 2% chlorhexidine for cutaneous antisepsis.  A sterile ultrasound technique   with sterile gel and sterile probe covers was also utilized.      After discussing the procedure and possible complications with the patient, informed gown, sterile gloves, sterile full-body drape, hand hygiene and 2% chlorhexidine for cutaneous antisepsis. A sterile ultrasound technique   with sterile gel and sterile probe covers was also utilized.      After discussing the procedure and possible complications with the patient, informed consent was obtained.  The patient was placed on the Special Procedures table.  The right upper extremity was sterilely prepared using 2% chlorhexidine and then draped   with sterile towels and a body-sized sterile drape.  All personnel in the Special Procedures Room donned caps and surgical masks.  In addition, the  and first assistant donned sterile gowns and gloves after proper hand cleansing.      A pre-procedure time out was performed in order to assure the correct patient and procedure.  Local anesthetic was administered.   A peripheral vein was accessed with sonographic guidance.  A sonographic spot image was obtained for documentation.  A   guidewire was advanced into the vein with fluoroscopic guidance and a sheath was placed over the guidewire.      A 5-Spanish 41 cm dual lumen PICC was advanced through the sheath, up the arm and into the central vasculature.  It was positioned appropriately.  The sheath was removed.  The catheter was shown to aspirate and infuse properly.  The flange of the catheter   was affixed to the arm using a PICC securement device.   A spot image of the chest showed the tip of the PICC line to lie in the cavoatrial junction. The patient tolerated the procedure well and without complications.      2.14 mGy of fluoroscopy was used, with a single still saved.  No diagnostic images were obtained.         Impression:         SUCCESSFUL PICC PLACEMENT WITHOUT IMMEDIATE COMPLICATIONS.             IR ULTRASOUND GUIDANCE VASCULAR ACCESS [[id number]] Collected: 07/17/18 1231     Order Status: Completed Updated: 07/17/18 1234     Narrative:       IR ULTRASOUND GUIDANCE VASCULAR ACCESS, IR PICC WO SQ PORT/PUMP > 5 YEARS, IR FLUORO GUIDED CVA DEVICE PLACEMENT: 7/17/2018    CLINICAL HISTORY:  PICC insertion for IV antibiotics for dental disease and facial cellulitis. Central venous catheter was inserted using a maximal sterile barrier technique which includes cap, mask, sterile gown, sterile gloves, sterile full-body drape, hand hygiene and 2% chlorhexidine for cutaneous antisepsis. A sterile ultrasound technique   with sterile gel and sterile probe covers was also utilized.      After discussing the procedure and possible complications with the patient, informed consent was obtained.  The patient was placed on the Special Procedures table.  The right upper extremity was sterilely prepared using 2% chlorhexidine and then draped   with sterile towels and a body-sized sterile drape.  All personnel in the Special Procedures Room donned caps and surgical masks.  In addition, the  and first assistant donned sterile gowns and gloves after proper hand cleansing.      A pre-procedure time out was performed in order to assure the correct patient and procedure.  Local anesthetic was administered.   A peripheral vein was accessed with sonographic guidance.  A sonographic spot image was obtained for documentation.  A   guidewire was advanced into the vein with fluoroscopic guidance and a sheath was placed over the guidewire.      A 5-Malay 41 cm dual lumen PICC was advanced through the sheath, up the arm and into the central vasculature.  It was positioned appropriately.  The sheath was removed.  The catheter was shown to aspirate and infuse properly.  The flange of the catheter   was affixed to the arm using a PICC securement device.   A spot image of the chest showed the tip of the PICC line to lie in the cavoatrial junction. The patient tolerated the procedure well and without complications.      4.08 mGy of fluoroscopy was used, with a single still saved. No diagnostic images were obtained.   Impression:         SUCCESSFUL PICC PLACEMENT WITHOUT IMMEDIATE COMPLICATIONS.             XR MANDIBLE (MIN 4 VIEWS) [[id number]] Collected: 07/16/18 0718     Order Status: Completed Updated: 07/16/18 1531     Narrative:       EXAMINATION: XR MANDIBLE (MIN 4 VIEWS):    DATE AND TIME:7/14/2018 at 2:45 PM.    CLINICAL HISTORY: ACUTE MANDIBULAR PAIN.   ATTENTION TO ANTERIOR MANDIBLE WITH ATTENTION TO CENTRAL AND LATERAL INCISORS FOR ABSCESS, RE-EVALUATE POSTERIOR LEFT MANDIBLE AREA OF LEFT SECOND MOLAR FOR RESIDUAL ABSCESS.      COMPARISON: None available. FINDINGS: There is no evidence of fracture or other acute bony abnormality. Attention to the left mandible shows some relative lucency surrounding the left first and second molars similar to the appearance on the recent CT of the facial bones and   consistent with dental caries. Otherwise, no obvious cortical destruction or evidence of periodontal abscess on these plain films.     Impression:       NEGATIVE MANDIBLE.     CT MAXILLOFACIAL W CONTRAST [[id number]] Collected: 07/09/18 2103     Order Status: Completed Updated: 07/09/18 2119     Narrative:       UNENHANCED CT SCAN OF THE MAXILLOFACIAL REGION. CLINICAL HISTORY: History of broken tooth for 2 months. Pain and swelling for one day. COMPARISON: None    TECHNIQUE: Multiple unenhanced serial axial images of the of the maxillofacial region with both sagittal and coronal reconstructions was performed following the intravenous menstruation 100 mL of Isovue-370. Markers were placed region of interest.. FINDINGS:    The field-of-view visualized basal brain shows no enhancing soft tissue masses vascular abnormalities or malformations    The visualized paranasal sinuses, frontal, ethmoid, sphenoid, well aerated. There is mucoperiosteal thickening seen in the right maxillary sinus without air-fluid levels. No retention cyst or polyps. The mastoids are well aerated.   Both ostiomeatal units are grossly patent. There are 2 markers noted. One overlies the left parotid and the second overlies approximately left angle of mandible. The parotids show no unusual enhancement pattern. No periparotid inflammatory stranding. No enhancing focal fluid collections. The submandibular glands, and thyroid glands are unremarkable. Small visualized portion of the apices the lungs are grossly unremarkable. The second marker at the angle of mandible is in the approximate region of diffuse soft tissue swelling seen overlying the left perimandibular region. There is a focal fluid   collection with peripheral enhancing borders and air seen to the left of the teeth approximately 19 through 22. There are caries noted in some of the teeth. Findings most likely that of a abscess odontogenic in origin measuring 3.0 x 2.5 in the   transverse and AP dimension. Both globes are intact.  No significant preseptal or post septal findings. The field-of-view visualized portions of the extracranial vessels are unremarkable. There is adenopathy seen in the levels Ib, 2, 3. There are no acute fractures or focal bony abnormalities.       Impression:         THERE IS DIFFUSE SUBCUTANEOUS STRANDING, INFLAMMATORY CHANGES OVERLYING THE LEFT PERIMANDIBULAR REGION. THERE IS ASSOCIATED ABSCESS SUBJACENT TO THE LEFT MANDIBLE AND REGIONS OF TOOTH NUMBER APPROXIMATELY 19 THROUGH 22. THERE IS SOME ASSOCIATED DENTAL   CARIES. FINDINGS MOST LIKELY REPRESENT ODONTOGENIC ABSCESS IN ORIGIN.     Other findings detailed above    All CT scans at this facility use dose modulation, iterative reconstruction, and/or weight based dosing when appropriate to reduce radiation dose to as low as reasonably achievable.                 ASSESSMENT & PLAN  Patient Active Hospital Problem List:    Active Hospital Problems    Diagnosis Date Noted    Facial pain, acute [R51] 07/17/2018    Facial cellulitis [L03.211]     Dental abscess [K04.7] 07/09/2018        Patient appears to have localized pain in the left TMJ, left mandibular area as well as in the face, more mechanical pain affecting her ability to to eat and use her left-sided TMJ. Patient is demented high-dose pain medication, she may have some underlying nociceptive pain due to the infectious process however she started antibiotic and has been there with should be decreased by time.     RECOMMENDATION:  SEE ORDERS    I will start on Toradol 30 mg injection every 6 hours to help managing the inflammatory process had decreasing the patient needs for IV Dilaudid    I would encourage to wean the IV Dilaudid regularly    I will start encourage non-opioid therapy including Tylenol and topical measure including topical lidocaine to the left mandibular area    On discharge patient made only short course of pain therapy      [place] automated prescription reporting system has been reviewed for patient    SIGNATURE: Pat Infante MD PATIENT NAME: Roxane Beth   DATE: July 17, 2018 MRN: [id number]   TIME: 5:23 PM PAGER/CONTACT #: [phone]

## 2018-07-17 NOTE — CARE COORDINATION
IV CHECK SENT IN ANTICIPATION OF IV ANTIBIOTICS AT HOME. CARE TEAM TO FOLLOW.  Electronically signed by Isaac Rincon RN on 7/17/2018 at 3:39 PM

## 2018-07-18 LAB
ANAEROBIC CULTURE: ABNORMAL
ANAEROBIC CULTURE: ABNORMAL
ANION GAP SERPL CALCULATED.3IONS-SCNC: 11 MEQ/L (ref 7–13)
BASOPHILS ABSOLUTE: 0.1 K/UL (ref 0–0.2)
BASOPHILS RELATIVE PERCENT: 1.2 %
BUN BLDV-MCNC: 7 MG/DL (ref 6–20)
CALCIUM SERPL-MCNC: 8.7 MG/DL (ref 8.6–10.2)
CHLORIDE BLD-SCNC: 98 MEQ/L (ref 98–107)
CO2: 28 MEQ/L (ref 22–29)
CREAT SERPL-MCNC: 0.74 MG/DL (ref 0.5–0.9)
CULTURE SURGICAL: ABNORMAL
CULTURE SURGICAL: ABNORMAL
EKG ATRIAL RATE: 72 BPM
EKG ATRIAL RATE: 74 BPM
EKG P AXIS: 43 DEGREES
EKG P AXIS: 45 DEGREES
EKG P-R INTERVAL: 158 MS
EKG P-R INTERVAL: 162 MS
EKG Q-T INTERVAL: 378 MS
EKG Q-T INTERVAL: 390 MS
EKG QRS DURATION: 88 MS
EKG QRS DURATION: 92 MS
EKG QTC CALCULATION (BAZETT): 419 MS
EKG QTC CALCULATION (BAZETT): 427 MS
EKG R AXIS: -75 DEGREES
EKG R AXIS: -76 DEGREES
EKG T AXIS: 27 DEGREES
EKG T AXIS: 33 DEGREES
EKG VENTRICULAR RATE: 72 BPM
EKG VENTRICULAR RATE: 74 BPM
EOSINOPHILS ABSOLUTE: 0.4 K/UL (ref 0–0.7)
EOSINOPHILS RELATIVE PERCENT: 4.5 %
GFR AFRICAN AMERICAN: >60
GFR NON-AFRICAN AMERICAN: >60
GLUCOSE BLD-MCNC: 91 MG/DL (ref 74–109)
GRAM STAIN RESULT: ABNORMAL
GRAM STAIN RESULT: ABNORMAL
HCT VFR BLD CALC: 31.5 % (ref 37–47)
HEMOGLOBIN: 9.8 G/DL (ref 12–16)
LV EF: 65 %
LVEF MODALITY: NORMAL
LYMPHOCYTES ABSOLUTE: 1.7 K/UL (ref 1–4.8)
LYMPHOCYTES RELATIVE PERCENT: 20.7 %
MAGNESIUM: 2.3 MG/DL (ref 1.7–2.3)
MCH RBC QN AUTO: 21.4 PG (ref 27–31.3)
MCHC RBC AUTO-ENTMCNC: 31.2 % (ref 33–37)
MCV RBC AUTO: 68.7 FL (ref 82–100)
MONOCYTES ABSOLUTE: 0.6 K/UL (ref 0.2–0.8)
MONOCYTES RELATIVE PERCENT: 6.8 %
NEUTROPHILS ABSOLUTE: 5.5 K/UL (ref 1.4–6.5)
NEUTROPHILS RELATIVE PERCENT: 66.8 %
ORGANISM: ABNORMAL
ORGANISM: ABNORMAL
PDW BLD-RTO: 27.3 % (ref 11.5–14.5)
PLATELET # BLD: 273 K/UL (ref 130–400)
POTASSIUM SERPL-SCNC: 3.8 MEQ/L (ref 3.5–5.1)
RBC # BLD: 4.59 M/UL (ref 4.2–5.4)
SODIUM BLD-SCNC: 137 MEQ/L (ref 132–144)
TSH SERPL DL<=0.05 MIU/L-ACNC: 1.41 UIU/ML (ref 0.27–4.2)
WBC # BLD: 8.2 K/UL (ref 4.8–10.8)

## 2018-07-18 PROCEDURE — 6370000000 HC RX 637 (ALT 250 FOR IP): Performed by: DENTIST

## 2018-07-18 PROCEDURE — 6360000002 HC RX W HCPCS: Performed by: INTERNAL MEDICINE

## 2018-07-18 PROCEDURE — S0028 INJECTION, FAMOTIDINE, 20 MG: HCPCS | Performed by: INTERNAL MEDICINE

## 2018-07-18 PROCEDURE — 6370000000 HC RX 637 (ALT 250 FOR IP): Performed by: INTERNAL MEDICINE

## 2018-07-18 PROCEDURE — 6360000002 HC RX W HCPCS: Performed by: DENTIST

## 2018-07-18 PROCEDURE — 2500000003 HC RX 250 WO HCPCS: Performed by: INTERNAL MEDICINE

## 2018-07-18 PROCEDURE — 84443 ASSAY THYROID STIM HORMONE: CPT

## 2018-07-18 PROCEDURE — 2500000003 HC RX 250 WO HCPCS

## 2018-07-18 PROCEDURE — 99254 IP/OBS CNSLTJ NEW/EST MOD 60: CPT | Performed by: INTERNAL MEDICINE

## 2018-07-18 PROCEDURE — 93306 TTE W/DOPPLER COMPLETE: CPT

## 2018-07-18 PROCEDURE — 85025 COMPLETE CBC W/AUTO DIFF WBC: CPT

## 2018-07-18 PROCEDURE — 6360000002 HC RX W HCPCS: Performed by: NURSE PRACTITIONER

## 2018-07-18 PROCEDURE — 1210000000 HC MED SURG R&B

## 2018-07-18 PROCEDURE — 99232 SBSQ HOSP IP/OBS MODERATE 35: CPT | Performed by: INTERNAL MEDICINE

## 2018-07-18 PROCEDURE — 80048 BASIC METABOLIC PNL TOTAL CA: CPT

## 2018-07-18 PROCEDURE — 2580000003 HC RX 258: Performed by: NURSE PRACTITIONER

## 2018-07-18 PROCEDURE — 2580000003 HC RX 258: Performed by: INTERNAL MEDICINE

## 2018-07-18 PROCEDURE — 83735 ASSAY OF MAGNESIUM: CPT

## 2018-07-18 PROCEDURE — 93005 ELECTROCARDIOGRAM TRACING: CPT

## 2018-07-18 RX ORDER — L. ACIDOPHILUS/L.BULGARICUS 1MM CELL
1 TABLET ORAL 2 TIMES DAILY
Qty: 20 TABLET | Refills: 0 | Status: SHIPPED | OUTPATIENT
Start: 2018-07-18 | End: 2018-07-28

## 2018-07-18 RX ORDER — WATER FOR INJ.,BACTERIOSTATIC
VIAL (ML) INJECTION
Status: COMPLETED
Start: 2018-07-18 | End: 2018-07-18

## 2018-07-18 RX ORDER — KETOROLAC TROMETHAMINE 10 MG/1
10 TABLET, FILM COATED ORAL EVERY 6 HOURS PRN
Qty: 20 TABLET | Refills: 0 | Status: SHIPPED | OUTPATIENT
Start: 2018-07-18 | End: 2019-02-28

## 2018-07-18 RX ORDER — OXYCODONE HYDROCHLORIDE AND ACETAMINOPHEN 5; 325 MG/1; MG/1
1 TABLET ORAL EVERY 6 HOURS PRN
Qty: 30 TABLET | Refills: 0 | Status: SHIPPED | OUTPATIENT
Start: 2018-07-18 | End: 2018-07-21 | Stop reason: HOSPADM

## 2018-07-18 RX ADMIN — PIPERACILLIN SODIUM AND TAZOBACTAM SODIUM 3.38 G: 3; .375 INJECTION, POWDER, LYOPHILIZED, FOR SOLUTION INTRAVENOUS at 05:36

## 2018-07-18 RX ADMIN — KETOROLAC TROMETHAMINE 15 MG: 15 INJECTION, SOLUTION INTRAMUSCULAR; INTRAVENOUS at 12:22

## 2018-07-18 RX ADMIN — OXYCODONE HYDROCHLORIDE AND ACETAMINOPHEN 1 TABLET: 5; 325 TABLET ORAL at 16:03

## 2018-07-18 RX ADMIN — KETOROLAC TROMETHAMINE 15 MG: 15 INJECTION, SOLUTION INTRAMUSCULAR; INTRAVENOUS at 06:42

## 2018-07-18 RX ADMIN — KETOROLAC TROMETHAMINE 15 MG: 15 INJECTION, SOLUTION INTRAMUSCULAR; INTRAVENOUS at 18:21

## 2018-07-18 RX ADMIN — PIPERACILLIN SODIUM AND TAZOBACTAM SODIUM 3.38 G: 3; .375 INJECTION, POWDER, LYOPHILIZED, FOR SOLUTION INTRAVENOUS at 17:38

## 2018-07-18 RX ADMIN — OXYCODONE HYDROCHLORIDE AND ACETAMINOPHEN 1 TABLET: 5; 325 TABLET ORAL at 10:43

## 2018-07-18 RX ADMIN — Medication 10 ML: at 22:02

## 2018-07-18 RX ADMIN — BACTERIOSTATIC WATER: 1 INJECTION, SOLUTION INTRAMUSCULAR; INTRAVENOUS; SUBCUTANEOUS at 16:04

## 2018-07-18 RX ADMIN — PIPERACILLIN SODIUM AND TAZOBACTAM SODIUM 3.38 G: 3; .375 INJECTION, POWDER, LYOPHILIZED, FOR SOLUTION INTRAVENOUS at 12:22

## 2018-07-18 RX ADMIN — HYDROMORPHONE HYDROCHLORIDE 1 MG: 1 INJECTION, SOLUTION INTRAMUSCULAR; INTRAVENOUS; SUBCUTANEOUS at 03:59

## 2018-07-18 RX ADMIN — LACTOBACILLUS TAB 1 TABLET: TAB at 20:24

## 2018-07-18 RX ADMIN — HYDROMORPHONE HYDROCHLORIDE 1 MG: 1 INJECTION, SOLUTION INTRAMUSCULAR; INTRAVENOUS; SUBCUTANEOUS at 07:55

## 2018-07-18 RX ADMIN — Medication 10 ML: at 07:56

## 2018-07-18 RX ADMIN — ONDANSETRON HYDROCHLORIDE 4 MG: 2 INJECTION, SOLUTION INTRAMUSCULAR; INTRAVENOUS at 08:36

## 2018-07-18 RX ADMIN — HYDROMORPHONE HYDROCHLORIDE 0.5 MG: 1 INJECTION, SOLUTION INTRAMUSCULAR; INTRAVENOUS; SUBCUTANEOUS at 13:34

## 2018-07-18 RX ADMIN — Medication 10 ML: at 20:26

## 2018-07-18 RX ADMIN — ONDANSETRON HYDROCHLORIDE 4 MG: 2 INJECTION, SOLUTION INTRAMUSCULAR; INTRAVENOUS at 17:38

## 2018-07-18 RX ADMIN — OXYCODONE HYDROCHLORIDE AND ACETAMINOPHEN 1 TABLET: 5; 325 TABLET ORAL at 20:25

## 2018-07-18 RX ADMIN — FERROUS SULFATE TAB 325 MG (65 MG ELEMENTAL FE) 325 MG: 325 (65 FE) TAB at 17:38

## 2018-07-18 RX ADMIN — FERROUS SULFATE TAB 325 MG (65 MG ELEMENTAL FE) 325 MG: 325 (65 FE) TAB at 07:59

## 2018-07-18 RX ADMIN — FAMOTIDINE 20 MG: 10 INJECTION INTRAVENOUS at 20:25

## 2018-07-18 RX ADMIN — LACTOBACILLUS TAB 1 TABLET: TAB at 07:59

## 2018-07-18 RX ADMIN — HYDROMORPHONE HYDROCHLORIDE 0.5 MG: 1 INJECTION, SOLUTION INTRAMUSCULAR; INTRAVENOUS; SUBCUTANEOUS at 22:02

## 2018-07-18 RX ADMIN — FAMOTIDINE 20 MG: 10 INJECTION INTRAVENOUS at 07:59

## 2018-07-18 RX ADMIN — OXYCODONE HYDROCHLORIDE AND ACETAMINOPHEN 1 TABLET: 5; 325 TABLET ORAL at 05:35

## 2018-07-18 ASSESSMENT — ENCOUNTER SYMPTOMS
GASTROINTESTINAL NEGATIVE: 1
BLOOD IN STOOL: 0
CHEST TIGHTNESS: 0
SHORTNESS OF BREATH: 0
COUGH: 0
EYES NEGATIVE: 1
RESPIRATORY NEGATIVE: 1
WHEEZING: 0
NAUSEA: 0
STRIDOR: 0

## 2018-07-18 ASSESSMENT — PAIN DESCRIPTION - DESCRIPTORS
DESCRIPTORS: ACHING

## 2018-07-18 ASSESSMENT — PAIN DESCRIPTION - PROGRESSION
CLINICAL_PROGRESSION: GRADUALLY IMPROVING

## 2018-07-18 ASSESSMENT — PAIN DESCRIPTION - PAIN TYPE
TYPE: ACUTE PAIN

## 2018-07-18 ASSESSMENT — PAIN DESCRIPTION - ORIENTATION
ORIENTATION: LEFT

## 2018-07-18 ASSESSMENT — PAIN DESCRIPTION - FREQUENCY
FREQUENCY: CONTINUOUS

## 2018-07-18 ASSESSMENT — PAIN SCALES - GENERAL
PAINLEVEL_OUTOF10: 6
PAINLEVEL_OUTOF10: 10
PAINLEVEL_OUTOF10: 6
PAINLEVEL_OUTOF10: 6
PAINLEVEL_OUTOF10: 7
PAINLEVEL_OUTOF10: 9
PAINLEVEL_OUTOF10: 10
PAINLEVEL_OUTOF10: 6
PAINLEVEL_OUTOF10: 9
PAINLEVEL_OUTOF10: 8
PAINLEVEL_OUTOF10: 10

## 2018-07-18 ASSESSMENT — PAIN DESCRIPTION - ONSET
ONSET: ON-GOING

## 2018-07-18 ASSESSMENT — PAIN DESCRIPTION - LOCATION
LOCATION: JAW

## 2018-07-18 NOTE — DISCHARGE SUMMARY
Hospital Medicine Discharge Summary    Patient: Randell Cordova     MRN: [id number]  Gender: female  : 1975   Age: 37 y.o. Code Status: Full Code     Admit Date: 2018   Discharge Date:   2018  Disposition:        Primary Care Provider: Peggy Mendoza MD    Admitting Physician: Grace Fowler MD  Discharge Physician: Neyda Knight MD       Discharge Diagnoses:  . Left dental/mandibular abscess with left face cellulitis status post incision and drainage. Cultures are pending. Patient is on vancomycin and Zosyn per ID recommendations  . Anemia status post blood transfusion. Hemoglobin improved. .  Hypokalemia improved. Hospital Course: The patient was started on IV antibiotic therapy. She was seen by oral surgery who recommended and performed incision and drainage. White blood count after that was normal or became normal.  Patient has not had fever. She still has swelling and pain which is significant and required several pain medications but said improving. Patient feels better on Toradol and Percocet. IV Dilaudid is being. Patient has PICC line. Possible plan to discharge patient on IV Zosyn. Initial cultures showed light growth of bacteria avoids    Discharge Medications:   Current Discharge Medication List      START taking these medications    Details   oxyCODONE-acetaminophen (PERCOCET) 5-325 MG per tablet Take 1 tablet by mouth every 6 hours as needed for Pain for up to 7 days. Erlene Ripper: 30 tablet, Refills: 0    Associated Diagnoses: Dental abscess;  Facial cellulitis      lactobacillus acidophilus (FLORANEX) Take 1 tablet by mouth 2 times daily for 10 days  Qty: 20 tablet, Refills: 0      ketorolac (TORADOL) 10 MG tablet Take 1 tablet by mouth every 6 hours as needed for Pain  Qty: 20 tablet, Refills: 0           Current Discharge Medication List        Current Discharge Medication List      CONTINUE these medications which have NOT CHANGED    Details   ranitidine was inserted using a maximal sterile barrier technique which includes cap, mask, sterile gown, sterile gloves, sterile full-body drape, hand hygiene and 2% chlorhexidine for cutaneous antisepsis. A sterile ultrasound technique with sterile gel and sterile probe covers was also utilized. After discussing the procedure and possible complications with the patient, informed consent was obtained. The patient was placed on the Special Procedures table. The right upper extremity was sterilely prepared using 2% chlorhexidine and then draped with sterile towels and a body-sized sterile drape. All personnel in the Special Procedures Room donned caps and surgical masks. In addition, the  and first assistant donned sterile gowns and gloves after proper hand cleansing. A pre-procedure time out was performed in order to assure the correct patient and procedure. Local anesthetic was administered. A peripheral vein was accessed with sonographic guidance. A sonographic spot image was obtained for documentation. A guidewire was advanced into the vein with fluoroscopic guidance and a sheath was placed over the guidewire. A 5-Polish 41 cm dual lumen PICC was advanced through the sheath, up the arm and into the central vasculature. It was positioned appropriately. The sheath was removed. The catheter was shown to aspirate and infuse properly. The flange of the catheter  was affixed to the arm using a PICC securement device. A spot image of the chest showed the tip of the PICC line to lie in the cavoatrial junction. The patient tolerated the procedure well and without complications. 1.69 mGy of fluoroscopy was used, with a single still saved. No diagnostic images were obtained. SUCCESSFUL PICC PLACEMENT WITHOUT IMMEDIATE COMPLICATIONS.      Ir Picc Wo Sq Port/pump > 5 Years    Result Date: 7/17/2018  IR ULTRASOUND GUIDANCE VASCULAR ACCESS, IR PICC WO SQ PORT/PUMP > 5 YEARS, IR FLUORO GUIDED CVA extracranial vessels are unremarkable. There is adenopathy seen in the levels Ib, 2, 3. There are no acute fractures or focal bony abnormalities. THERE IS DIFFUSE SUBCUTANEOUS STRANDING, INFLAMMATORY CHANGES OVERLYING THE LEFT PERIMANDIBULAR REGION. THERE IS ASSOCIATED ABSCESS SUBJACENT TO THE LEFT MANDIBLE AND REGIONS OF TOOTH NUMBER APPROXIMATELY 19 THROUGH 22. THERE IS SOME ASSOCIATED DENTAL CARIES. FINDINGS MOST LIKELY REPRESENT ODONTOGENIC ABSCESS IN ORIGIN. Other findings detailed above All CT scans at this facility use dose modulation, iterative reconstruction, and/or weight based dosing when appropriate to reduce radiation dose to as low as reasonably achievable. Consults:     IP CONSULT TO INFECTIOUS DISEASES  IP CONSULT TO ORAL SURGERY  IP CONSULT TO PAIN MANAGEMENT  IP CONSULT TO HOME CARE NEEDS    Labs: For convenience and continuity at follow-up the following most recent labs are provided:    Lab Results   Component Value Date    WBC 8.2 07/18/2018    HGB 9.8 07/18/2018    HCT 31.5 07/18/2018    MCV 68.7 07/18/2018     07/18/2018     07/18/2018    K 3.8 07/18/2018    K 3.6 07/10/2018    CL 98 07/18/2018    CO2 28 07/18/2018    BUN 7 07/18/2018    CREATININE 0.74 07/18/2018    CALCIUM 8.7 07/18/2018    ALKPHOS 103 07/09/2018    ALT 16 07/09/2018    AST 16 07/09/2018    BILITOT 0.5 07/09/2018    LABALBU 3.9 07/09/2018     No results found for: INR    The patient was seen and examined on day of discharge and this discharge summary is in conjunction with any daily progress note from day of discharge. Time spent on discharge is more than 30 minutes in the examination, evaluation, counseling and review of medications and discharge plan. Signed: Christiano Ramirez MD   7/18/2018      Thank you Brian Acuna MD for the opportunity to be involved in this patient's care.  If you have any questions or concerns please feel free to contact

## 2018-07-18 NOTE — CONSULTS
There are caries noted in some of the teeth. Findings most likely that of a abscess odontogenic in origin measuring 3.0 x 2.5 in the transverse and AP dimension. Both globes are intact. No significant preseptal or post septal findings. The field-of-view visualized portions of the extracranial vessels are unremarkable. There is adenopathy seen in the levels Ib, 2, 3. There are no acute fractures or focal bony abnormalities. THERE IS DIFFUSE SUBCUTANEOUS STRANDING, INFLAMMATORY CHANGES OVERLYING THE LEFT PERIMANDIBULAR REGION. THERE IS ASSOCIATED ABSCESS SUBJACENT TO THE LEFT MANDIBLE AND REGIONS OF TOOTH NUMBER APPROXIMATELY 19 THROUGH 22. THERE IS SOME ASSOCIATED DENTAL CARIES. FINDINGS MOST LIKELY REPRESENT ODONTOGENIC ABSCESS IN ORIGIN. Other findings detailed above All CT scans at this facility use dose modulation, iterative reconstruction, and/or weight based dosing when appropriate to reduce radiation dose to as low as reasonably achievable. Active Hospital Problems    Diagnosis Date Noted    Facial pain, acute [R51] 07/17/2018     Priority: Low    Facial cellulitis [X76.501]      Priority: Low    Dental abscess [K04.7] 07/09/2018     Priority: Low         Impression/Plan:   1. Ventricualr ectopy  2. Tooth abscess  3. Picc line  4. Check TSH MAg and K  5. Check Echo  6. Will follow. Low threshold for IAN if Fevere or worsening Leukocytosis     Thank you for allowing us to participate in the care of this patient. Will continue to follow. Please call if questions or concerns arise.     Electronically signed by Liana Kelley MD on 7/18/2018 at 11:54 AM

## 2018-07-18 NOTE — PROGRESS NOTES
Hospitalist  Follow-up      Date of Service: 7/18/2018    Subjective:    Patient stated that sometimes she experiences intermittent palpitation. On telemetry monitor, she had couplets triplets and sometimes quadruplets of PVCs. She feels them even before the PICC line was placed. She denied chest pain or shortness of breath. She still have pain in the left lower jaw but it's slightly better. History reviewed. No pertinent past medical history. family history is not on file. reports that she has never smoked. She has never used smokeless tobacco. She reports that she does not drink alcohol or use drugs. Case DW RN       Objective:  Exam:  /73   Pulse 78   Temp 98.2 °F (36.8 °C)   Resp 18   Ht 5' 3\" (1.6 m)   Wt 194 lb 14.2 oz (88.4 kg)   LMP 07/05/2018   SpO2 98%   BMI 34.52 kg/m²     Physical Exam:  General appearance: alert, appears stated age and cooperative  Skin: Skin color, texture, normal. No rashes or lesions  HEENT: No nose bleed, headache, vision problems  CV: C/O chest pain, tightness, pressure,   Respiratory: C/o no SOB, ACHARYA, Orthopnea, PND  GI: No abdominal pain, black stool, bloating  Limbs: No c/o edema, pain, swelling, intermittent claudication, joint pains. PICC line is located in the right arm  Neuro: No dizziness, lightheadedness, syncope, gait problems, memory problems  Psych: grossly normal. No SI/depression.     Medications:  Current Facility-Administered Medications   Medication Dose Route Frequency Provider Last Rate Last Dose    HYDROmorphone (DILAUDID) injection 0.5 mg  0.5 mg Intravenous Q8H PRN Susan Draper MD        famotidine (PEPCID) injection 20 mg  20 mg Intravenous BID Linh Lopes MD   20 mg at 07/18/18 4126    calcium carbonate (TUMS) chewable tablet 500 mg  500 mg Oral BID PRN Linh Lopes MD   500 mg at 07/17/18 6867    ketorolac (TORADOL) injection 15 mg  15 mg Intravenous Q6H PRN Susan Draper MD   15 mg at 07/18/18 3500

## 2018-07-18 NOTE — CARE COORDINATION
MET WITH PATIENT AFTER DISCUSSION WITH DR. Stokes 178 STATED PATIENT WILL NEED 10 DAYS OF IV ZOSYN. WITH FREEDOM OF CHOICE, SHE CHOSE [address] [facility] IV ANTIBIOTIC THERAPY TEACHING. REFERRAL CALLED TO CARLOS IN [place] AND TO MELINDA AT [facility] OFFICE. JACOBY SERVED DR. Riddle 39 TO OBTAIN PRESCRIPTION FOR [facility]. SPOKE [address] IF THEY RECEIVE PRESCRIPTION TODAY. THEY CAN MAKE A DELIVERY TO PATIENT'S HOME IN THE MORNING IF D/C'D THIS EVENING. CARDIOLOGY CONSULT ORDERED DUE TO VENTRICULAR ECTOPY. ECHO ORDERED.   Electronically signed by [place] RN on 7/18/2018 at 3:27 PM

## 2018-07-19 LAB
ANION GAP SERPL CALCULATED.3IONS-SCNC: 10 MEQ/L (ref 7–13)
BUN BLDV-MCNC: 7 MG/DL (ref 6–20)
CALCIUM SERPL-MCNC: 8.8 MG/DL (ref 8.6–10.2)
CHLORIDE BLD-SCNC: 102 MEQ/L (ref 98–107)
CO2: 27 MEQ/L (ref 22–29)
CREAT SERPL-MCNC: 0.83 MG/DL (ref 0.5–0.9)
GFR AFRICAN AMERICAN: >60
GFR NON-AFRICAN AMERICAN: >60
GLUCOSE BLD-MCNC: 115 MG/DL (ref 74–109)
POTASSIUM SERPL-SCNC: 3.9 MEQ/L (ref 3.5–5.1)
SODIUM BLD-SCNC: 139 MEQ/L (ref 132–144)

## 2018-07-19 PROCEDURE — 6370000000 HC RX 637 (ALT 250 FOR IP): Performed by: INTERNAL MEDICINE

## 2018-07-19 PROCEDURE — 2580000003 HC RX 258: Performed by: NURSE PRACTITIONER

## 2018-07-19 PROCEDURE — 2500000003 HC RX 250 WO HCPCS: Performed by: INTERNAL MEDICINE

## 2018-07-19 PROCEDURE — 6370000000 HC RX 637 (ALT 250 FOR IP): Performed by: ANESTHESIOLOGY

## 2018-07-19 PROCEDURE — 80048 BASIC METABOLIC PNL TOTAL CA: CPT

## 2018-07-19 PROCEDURE — 6360000002 HC RX W HCPCS: Performed by: INTERNAL MEDICINE

## 2018-07-19 PROCEDURE — 2580000003 HC RX 258: Performed by: INTERNAL MEDICINE

## 2018-07-19 PROCEDURE — 6360000002 HC RX W HCPCS: Performed by: NURSE PRACTITIONER

## 2018-07-19 PROCEDURE — 99232 SBSQ HOSP IP/OBS MODERATE 35: CPT | Performed by: INTERNAL MEDICINE

## 2018-07-19 PROCEDURE — 1210000000 HC MED SURG R&B

## 2018-07-19 PROCEDURE — 85025 COMPLETE CBC W/AUTO DIFF WBC: CPT

## 2018-07-19 PROCEDURE — 6370000000 HC RX 637 (ALT 250 FOR IP): Performed by: DENTIST

## 2018-07-19 PROCEDURE — S0028 INJECTION, FAMOTIDINE, 20 MG: HCPCS | Performed by: INTERNAL MEDICINE

## 2018-07-19 RX ORDER — LIDOCAINE 40 MG/G
CREAM TOPICAL PRN
Status: DISCONTINUED | OUTPATIENT
Start: 2018-07-19 | End: 2018-07-21 | Stop reason: HOSPADM

## 2018-07-19 RX ORDER — OXYCODONE HYDROCHLORIDE AND ACETAMINOPHEN 5; 325 MG/1; MG/1
1 TABLET ORAL EVERY 6 HOURS PRN
Status: DISCONTINUED | OUTPATIENT
Start: 2018-07-19 | End: 2018-07-21

## 2018-07-19 RX ORDER — ASPIRIN 81 MG/1
81 TABLET ORAL DAILY
Status: DISCONTINUED | OUTPATIENT
Start: 2018-07-19 | End: 2018-07-21 | Stop reason: HOSPADM

## 2018-07-19 RX ORDER — LIDOCAINE 40 MG/G
CREAM TOPICAL
Qty: 2 TUBE | Refills: 0 | Status: SHIPPED | OUTPATIENT
Start: 2018-07-19 | End: 2019-02-28

## 2018-07-19 RX ORDER — KETOROLAC TROMETHAMINE 10 MG/1
10 TABLET, FILM COATED ORAL EVERY 6 HOURS PRN
Qty: 20 TABLET | Refills: 0 | Status: SHIPPED | OUTPATIENT
Start: 2018-07-19 | End: 2018-08-03 | Stop reason: SDUPTHER

## 2018-07-19 RX ORDER — ACETAMINOPHEN 325 MG/1
650 TABLET ORAL EVERY 6 HOURS PRN
Qty: 120 TABLET | Refills: 3 | Status: SHIPPED | OUTPATIENT
Start: 2018-07-19 | End: 2022-04-14

## 2018-07-19 RX ORDER — METOPROLOL TARTRATE 50 MG/1
50 TABLET, FILM COATED ORAL 3 TIMES DAILY
Status: DISCONTINUED | OUTPATIENT
Start: 2018-07-19 | End: 2018-07-21 | Stop reason: HOSPADM

## 2018-07-19 RX ADMIN — KETOROLAC TROMETHAMINE 15 MG: 15 INJECTION, SOLUTION INTRAMUSCULAR; INTRAVENOUS at 12:14

## 2018-07-19 RX ADMIN — KETOROLAC TROMETHAMINE 15 MG: 15 INJECTION, SOLUTION INTRAMUSCULAR; INTRAVENOUS at 20:14

## 2018-07-19 RX ADMIN — PIPERACILLIN SODIUM AND TAZOBACTAM SODIUM 3.38 G: 3; .375 INJECTION, POWDER, LYOPHILIZED, FOR SOLUTION INTRAVENOUS at 20:13

## 2018-07-19 RX ADMIN — OXYCODONE HYDROCHLORIDE AND ACETAMINOPHEN 1 TABLET: 5; 325 TABLET ORAL at 17:40

## 2018-07-19 RX ADMIN — Medication 10 ML: at 20:14

## 2018-07-19 RX ADMIN — KETOROLAC TROMETHAMINE 15 MG: 15 INJECTION, SOLUTION INTRAMUSCULAR; INTRAVENOUS at 00:11

## 2018-07-19 RX ADMIN — Medication 10 ML: at 08:30

## 2018-07-19 RX ADMIN — ALTEPLASE 1 MG: 2.2 INJECTION, POWDER, LYOPHILIZED, FOR SOLUTION INTRAVENOUS at 15:17

## 2018-07-19 RX ADMIN — METOPROLOL TARTRATE 50 MG: 50 TABLET ORAL at 13:25

## 2018-07-19 RX ADMIN — FAMOTIDINE 20 MG: 10 INJECTION INTRAVENOUS at 20:14

## 2018-07-19 RX ADMIN — OXYCODONE HYDROCHLORIDE AND ACETAMINOPHEN 1 TABLET: 5; 325 TABLET ORAL at 23:44

## 2018-07-19 RX ADMIN — PIPERACILLIN SODIUM AND TAZOBACTAM SODIUM 3.38 G: 3; .375 INJECTION, POWDER, LYOPHILIZED, FOR SOLUTION INTRAVENOUS at 13:25

## 2018-07-19 RX ADMIN — HYDROMORPHONE HYDROCHLORIDE 0.5 MG: 1 INJECTION, SOLUTION INTRAMUSCULAR; INTRAVENOUS; SUBCUTANEOUS at 05:54

## 2018-07-19 RX ADMIN — FERROUS SULFATE TAB 325 MG (65 MG ELEMENTAL FE) 325 MG: 325 (65 FE) TAB at 08:29

## 2018-07-19 RX ADMIN — ENOXAPARIN SODIUM 40 MG: 100 INJECTION SUBCUTANEOUS at 08:29

## 2018-07-19 RX ADMIN — LACTOBACILLUS TAB 1 TABLET: TAB at 08:29

## 2018-07-19 RX ADMIN — PIPERACILLIN SODIUM AND TAZOBACTAM SODIUM 3.38 G: 3; .375 INJECTION, POWDER, LYOPHILIZED, FOR SOLUTION INTRAVENOUS at 00:11

## 2018-07-19 RX ADMIN — Medication 10 ML: at 05:54

## 2018-07-19 RX ADMIN — LACTOBACILLUS TAB 1 TABLET: TAB at 20:13

## 2018-07-19 RX ADMIN — FERROUS SULFATE TAB 325 MG (65 MG ELEMENTAL FE) 325 MG: 325 (65 FE) TAB at 17:38

## 2018-07-19 RX ADMIN — ASPIRIN 81 MG: 81 TABLET, COATED ORAL at 13:25

## 2018-07-19 RX ADMIN — METOPROLOL TARTRATE 50 MG: 50 TABLET ORAL at 20:13

## 2018-07-19 RX ADMIN — ONDANSETRON HYDROCHLORIDE 4 MG: 2 INJECTION, SOLUTION INTRAMUSCULAR; INTRAVENOUS at 20:37

## 2018-07-19 RX ADMIN — FAMOTIDINE 20 MG: 10 INJECTION INTRAVENOUS at 08:29

## 2018-07-19 RX ADMIN — OXYCODONE HYDROCHLORIDE AND ACETAMINOPHEN 1 TABLET: 5; 325 TABLET ORAL at 08:32

## 2018-07-19 ASSESSMENT — PAIN SCALES - GENERAL
PAINLEVEL_OUTOF10: 8
PAINLEVEL_OUTOF10: 10
PAINLEVEL_OUTOF10: 8
PAINLEVEL_OUTOF10: 10
PAINLEVEL_OUTOF10: 9
PAINLEVEL_OUTOF10: 10

## 2018-07-19 ASSESSMENT — ENCOUNTER SYMPTOMS
STRIDOR: 0
NAUSEA: 0
RESPIRATORY NEGATIVE: 1
GASTROINTESTINAL NEGATIVE: 1
SHORTNESS OF BREATH: 0
BLOOD IN STOOL: 0
CHEST TIGHTNESS: 0
EYES NEGATIVE: 1
COUGH: 0
WHEEZING: 0

## 2018-07-19 NOTE — PROGRESS NOTES
Progress Note  Patient: Bere Bowels  Unit/Bed: J405/S240-86  YOB: 1975  MRN: [id number]  Acct: [de-identified]   Admitting Diagnosis: Dental abscess [K04.7]  Dental abscess [K04.7]  Admit Date:  7/9/2018  Hospital Day: 9    Chief Complaint: Palp, Tooth abscess, VT    Histories:  History reviewed. No pertinent past medical history. Past Surgical History:   Procedure Laterality Date    [place] DENTAL SURGERY PROCEDURE Left 7/12/2018    REMOVAL OF TOOTH #18 WITH IRRIGARION OF ABSCESS performed by Jordyn Diaz DDS at [address] [place] [address] N/A 7/15/2018    DENTAL EXTRACTION performed by Jordyn Diaz DDS at [facility]     History reviewed. No pertinent family history. Social History     Social History    Marital status: Single     Spouse name: N/A    Number of children: N/A    Years of education: N/A     Social History Main Topics    Smoking status: Never Smoker    Smokeless tobacco: Never Used    Alcohol use No    Drug use: No    Sexual activity: Not Asked     Other Topics Concern    None     Social History Narrative    None       Subjective/HPIcan feel palpitations. No cp tooth better. No F C    EKG: SR  70s. At [facility] had 12 beat NSVT. Still has occ PVCs        Review of Systems:   Review of Systems   Constitutional: Positive for fatigue. Negative for diaphoresis. HENT: Negative. Eyes: Negative. Respiratory: Negative. Negative for cough, chest tightness, shortness of breath, wheezing and stridor. Cardiovascular: Positive for palpitations. Negative for chest pain and leg swelling. Gastrointestinal: Negative. Negative for blood in stool and nausea. Genitourinary: Negative. Musculoskeletal: Negative. Skin: Negative. Neurological: Positive for weakness. Negative for dizziness, syncope and light-headedness. Hematological: Negative. Psychiatric/Behavioral: Negative.           Physical Examination:    /80   Pulse 68   Temp 98.3 °F 07/19/2018    GLUCOSE 115 07/19/2018    PROT 7.4 07/09/2018    LABALBU 3.9 07/09/2018    CALCIUM 8.8 07/19/2018    BILITOT 0.5 07/09/2018    ALKPHOS 103 07/09/2018    AST 16 07/09/2018    ALT 16 07/09/2018     BMP:    Lab Results   Component Value Date     07/19/2018    K 3.9 07/19/2018    K 3.6 07/10/2018     07/19/2018    CO2 27 07/19/2018    BUN 7 07/19/2018    LABALBU 3.9 07/09/2018    CREATININE 0.83 07/19/2018    CALCIUM 8.8 07/19/2018    GFRAA >60.0 07/19/2018    LABGLOM >60.0 07/19/2018    GLUCOSE 115 07/19/2018     Magnesium:    Lab Results   Component Value Date    MG 2.3 07/18/2018     Troponin:  No results found for: [facility] Problems    Diagnosis Date Noted    Abscess of chin [L02.01]      Priority: Low    Facial pain, acute [R51] 07/17/2018     Priority: Low    Facial cellulitis [Q29.366]      Priority: Low    Dental abscess [K04.7] 07/09/2018     Priority: Low        Assessment/Plan:  1. Tooth abscess- improving. No F.  WBC stable  2. Echo EF 65% no wall motion AbN. Small PFO seen  3. Ventricular Ectopy- will BB. Add ASA  4. Keep On Tele  5.  Mag K TSH all WNL       Electronically signed by Rob Hall MD on 7/19/2018 at 11:00 AM

## 2018-07-19 NOTE — PROGRESS NOTES
Hospitalist  Follow-up      Date of Service: 7/19/2018    Subjective:    Pt is drowsy but arousable. Denied cp. Still c/o lower jaw pain. afebrile           History reviewed. No pertinent past medical history. family history is not on file. reports that she has never smoked. She has never used smokeless tobacco. She reports that she does not drink alcohol or use drugs. Case DW RN       Objective:  Exam:  /61   Pulse 72   Temp 98.3 °F (36.8 °C) (Oral)   Resp 16   Ht 5' 3\" (1.6 m)   Wt 194 lb 14.2 oz (88.4 kg)   LMP 07/05/2018   SpO2 99%   BMI 34.52 kg/m²     Physical Exam:  General appearance: alert, appears stated age and cooperative  Skin: Skin color, texture, normal. No rashes or lesions  HEENT: No nose bleed, headache, vision problems  CV: C/O chest pain, tightness, pressure,   Respiratory: C/o no SOB, ACHARYA, Orthopnea, PND  GI: No abdominal pain, black stool, bloating  Limbs: No c/o edema, pain, swelling, intermittent claudication, joint pains.  PICC line is located in the right arm  Neuro: No dizziness, lightheadedness, syncope, gait problems, memory problems  Psych: grossly normal. No SI/depression.     Medications:  Current Facility-Administered Medications   Medication Dose Route Frequency Provider Last Rate Last Dose    aspirin EC tablet 81 mg  81 mg Oral Daily Mina Soto MD   81 mg at 07/19/18 1325    metoprolol tartrate (LOPRESSOR) tablet 50 mg  50 mg Oral TID Mina Soto MD   50 mg at 07/19/18 1325    oxyCODONE-acetaminophen (PERCOCET) 5-325 MG per tablet 1 tablet  1 tablet Oral Q6H PRN Thomas Caicedo MD        alteplase (CATHFLO) injection 1 mg  1 mg Intracatheter Once PRN Juwan Truong MD        famotidine (PEPCID) injection 20 mg  20 mg Intravenous BID Kelle Londono MD   20 mg at 07/19/18 0801    calcium carbonate (TUMS) chewable tablet 500 mg  500 mg Oral BID PRN Kelle Londono MD   500 mg at 07/17/18 0354    ketorolac (TORADOL) injection 15 mg  15 mg Intravenous Q6H PRN Estrellita Webber MD   15 mg at 07/19/18 1214    ferrous sulfate tablet 325 mg  325 mg Oral BID  Estelita Ascencio MD   325 mg at 07/19/18 0829    piperacillin-tazobactam (ZOSYN) 3.375 g in dextrose 5 % 50 mL IVPB (mini-bag)  3.375 g Intravenous Q6H Margie Scales DO   Stopped at 07/19/18 1400    lactobacillus acidophilus [facility]) 1 tablet  1 tablet Oral BID Greg Hines MD   1 tablet at 07/19/18 0829    acetaminophen (TYLENOL) tablet 650 mg  650 mg Oral Q4H PRN Minda Bruno PA-C   650 mg at 07/10/18 1613    sodium chloride flush 0.9 % injection 10 mL  10 mL Intravenous 2 times per day MARIA EUGENIA Bolanos CNP   10 mL at 07/19/18 0830    sodium chloride flush 0.9 % injection 10 mL  10 mL Intravenous PRN MARIA EUGENIA Bolanos CNP        magnesium hydroxide (MILK OF MAGNESIA) 400 MG/5ML suspension 30 mL  30 mL Oral Daily PRN MARIA EUGENIA Bolanos CNP   30 mL at 07/14/18 2148    ondansetron (ZOFRAN) injection 4 mg  4 mg Intravenous Q6H PRN MARIA EUGENIA Bolanos CNP   4 mg at 07/18/18 1738    enoxaparin (LOVENOX) injection 40 mg  40 mg Subcutaneous Daily MARIA EUGENIA Bolanos CNP   40 mg at 07/19/18 1732       Data:  LABS:   Lab Results   Component Value Date     07/19/2018    K 3.9 07/19/2018    K 3.6 07/10/2018     07/19/2018    CO2 27 07/19/2018    BUN 7 07/19/2018    CREATININE 0.83 07/19/2018    GFRAA >60.0 07/19/2018    LABGLOM >60.0 07/19/2018    GLUCOSE 115 07/19/2018    MG 2.3 07/18/2018    CALCIUM 8.8 07/19/2018     Lab Results   Component Value Date    WBC 9.2 07/19/2018    RBC 4.77 07/19/2018    HGB 10.6 07/19/2018    HCT 33.3 07/19/2018    MCV 69.8 07/19/2018    RDW 27.3 07/19/2018     07/19/2018   No results found for: INR, PROTIME  Organism   Date Value Ref Range Status   07/15/2018 Bacteroides fragilis group (A)  Final       RADIOLOGY:  Ct Maxillofacial W Contrast    Result Date: 7/9/2018  UNENHANCED CT SCAN OF THE MAXILLOFACIAL REGION. CLINICAL HISTORY: History of broken tooth for 2 months. Pain and swelling for one day. COMPARISON: None TECHNIQUE: Multiple unenhanced serial axial images of the of the maxillofacial region with both sagittal and coronal reconstructions was performed following the intravenous menstruation 100 mL of Isovue-370. Markers were placed region of interest.. FINDINGS: The field-of-view visualized basal brain shows no enhancing soft tissue masses vascular abnormalities or malformations The visualized paranasal sinuses, frontal, ethmoid, sphenoid, well aerated. There is mucoperiosteal thickening seen in the right maxillary sinus without air-fluid levels. No retention cyst or polyps. The mastoids are well aerated. Both ostiomeatal units are grossly patent. There are 2 markers noted. One overlies the left parotid and the second overlies approximately left angle of mandible. The parotids show no unusual enhancement pattern. No periparotid inflammatory stranding. No enhancing focal fluid collections. The submandibular glands, and thyroid glands are unremarkable. Small visualized portion of the apices the lungs are grossly unremarkable. The second marker at the angle of mandible is in the approximate region of diffuse soft tissue swelling seen overlying the left perimandibular region. There is a focal fluid collection with peripheral enhancing borders and air seen to the left of the teeth approximately 19 through 22. There are caries noted in some of the teeth. Findings most likely that of a abscess odontogenic in origin measuring 3.0 x 2.5 in the transverse and AP dimension. Both globes are intact. No significant preseptal or post septal findings. The field-of-view visualized portions of the extracranial vessels are unremarkable. There is adenopathy seen in the levels Ib, 2, 3. There are no acute fractures or focal bony abnormalities.      THERE IS DIFFUSE SUBCUTANEOUS STRANDING, INFLAMMATORY CHANGES OVERLYING THE LEFT

## 2018-07-19 NOTE — CARE COORDINATION
7/19/18 I SPOKE [facility]. [facility] AND DR RUIZ SAW THE PT. NO DISCHARGE TODAY PER DR RUIZ.    [facility] WITH IV ZOSYN X 10 DAYS BUT I NEED A RX YET  [place] PLAN [facility] WITH IV ABX WHICH PT AGAIN CONFIRMED TO ME.

## 2018-07-19 NOTE — PROGRESS NOTES
0.9 % injection 10 mL  10 mL Intravenous PRN Milus Lor, APRN - CNP        magnesium hydroxide (MILK OF MAGNESIA) 400 MG/5ML suspension 30 mL  30 mL Oral Daily PRN Milus [place], APRN - CNP   30 mL at 07/14/18 2148    ondansetron (ZOFRAN) injection 4 mg  4 mg Intravenous Q6H PRN Milus Lor, APRN - CNP   4 mg at 07/18/18 1738    enoxaparin (LOVENOX) injection 40 mg  40 mg Subcutaneous Daily Philip Vicente, APRN - CNP   40 mg at 07/19/18 8677         ALLERGIES:  Patient has no known allergies. Review of Systems   Constitutional: Negative. HENT: Negative. Eyes: Negative. Respiratory: Negative. Cardiovascular: Negative. Gastrointestinal: Negative. Genitourinary: Negative. Musculoskeletal: Negative. Skin: Negative. Neurological: Negative. Endo/Heme/Allergies: Negative. Psychiatric/Behavioral: Negative. OBJECTIVE  PHYSICAL EXAM:  /80   Pulse 68   Temp 98.3 °F (36.8 °C) (Oral)   Resp 16   Ht 5' 3\" (1.6 m)   Wt 194 lb 14.2 oz (88.4 kg)   LMP 07/05/2018   SpO2 98%   BMI 34.52 kg/m²   Body mass index is 34.52 kg/m². CONSTITUTIONAL:  Swelling on the left side of face, tender to touch, talking or chewing   EYES:  Lids and lashes normal, pupils equal, round and reactive to light, extra ocular muscles intact, sclera clear, conjunctiva normal  ENT:   improved swelling and tenderness over the left mandible  NECK: Improved pain on the left neck BACK:  symmetric and no curvature  LUNGS:  no increased work of breathing and good air exchange  CARDIOVASCULAR:  regular rate and rhythm  ABDOMEN:  No scars, normal bowel sounds, soft, non-distended, non-tender, no masses palpated, no hepatosplenomegally  MUSCULOSKELETAL:   overall intact   NEUROLOGIC: Motor and sensory are intact   Cranial Nerves:  cranial nerves II-XII are grossly intact  SKIN:  no bruising or bleeding      DATA:   Diagnostic tests reviewed for today's visit:    All labs and imaging results reviewed.

## 2018-07-20 LAB
ANION GAP SERPL CALCULATED.3IONS-SCNC: 10 MEQ/L (ref 7–13)
ANISOCYTOSIS: ABNORMAL
BASOPHILS ABSOLUTE: 0.1 K/UL (ref 0–0.2)
BASOPHILS ABSOLUTE: 0.2 K/UL (ref 0–0.2)
BASOPHILS RELATIVE PERCENT: 1 %
BASOPHILS RELATIVE PERCENT: 2 %
BUN BLDV-MCNC: 8 MG/DL (ref 6–20)
CALCIUM SERPL-MCNC: 8.6 MG/DL (ref 8.6–10.2)
CHLORIDE BLD-SCNC: 101 MEQ/L (ref 98–107)
CO2: 27 MEQ/L (ref 22–29)
CREAT SERPL-MCNC: 0.69 MG/DL (ref 0.5–0.9)
EOSINOPHILS ABSOLUTE: 0.3 K/UL (ref 0–0.7)
EOSINOPHILS ABSOLUTE: 0.3 K/UL (ref 0–0.7)
EOSINOPHILS RELATIVE PERCENT: 3 %
EOSINOPHILS RELATIVE PERCENT: 3.6 %
GFR AFRICAN AMERICAN: >60
GFR NON-AFRICAN AMERICAN: >60
GLUCOSE BLD-MCNC: 91 MG/DL (ref 74–109)
HCT VFR BLD CALC: 33.3 % (ref 37–47)
HCT VFR BLD CALC: 33.7 % (ref 37–47)
HEMOGLOBIN: 10.6 G/DL (ref 12–16)
HEMOGLOBIN: 10.8 G/DL (ref 12–16)
HYPOCHROMIA: ABNORMAL
LYMPHOCYTES ABSOLUTE: 1.6 K/UL (ref 1–4.8)
LYMPHOCYTES ABSOLUTE: 2.1 K/UL (ref 1–4.8)
LYMPHOCYTES RELATIVE PERCENT: 20.3 %
LYMPHOCYTES RELATIVE PERCENT: 23 %
MACROCYTES: 0
MCH RBC QN AUTO: 22.1 PG (ref 27–31.3)
MCH RBC QN AUTO: 22.3 PG (ref 27–31.3)
MCHC RBC AUTO-ENTMCNC: 31.7 % (ref 33–37)
MCHC RBC AUTO-ENTMCNC: 32 % (ref 33–37)
MCV RBC AUTO: 69.6 FL (ref 82–100)
MCV RBC AUTO: 69.8 FL (ref 82–100)
METAMYELOCYTES RELATIVE PERCENT: 2 %
MICROCYTES: ABNORMAL
MONOCYTES ABSOLUTE: 0.2 K/UL (ref 0.2–0.8)
MONOCYTES ABSOLUTE: 0.6 K/UL (ref 0.2–0.8)
MONOCYTES RELATIVE PERCENT: 1.9 %
MONOCYTES RELATIVE PERCENT: 7.4 %
NEUTROPHILS ABSOLUTE: 5.5 K/UL (ref 1.4–6.5)
NEUTROPHILS ABSOLUTE: 6.4 K/UL (ref 1.4–6.5)
NEUTROPHILS RELATIVE PERCENT: 67.7 %
NEUTROPHILS RELATIVE PERCENT: 68 %
OVALOCYTES: ABNORMAL
PDW BLD-RTO: 27.3 % (ref 11.5–14.5)
PDW BLD-RTO: 28 % (ref 11.5–14.5)
PLATELET # BLD: 275 K/UL (ref 130–400)
PLATELET # BLD: 289 K/UL (ref 130–400)
PLATELET SLIDE REVIEW: ADEQUATE
POIKILOCYTES: ABNORMAL
POLYCHROMASIA: ABNORMAL
POTASSIUM SERPL-SCNC: 4.3 MEQ/L (ref 3.5–5.1)
RBC # BLD: 4.77 M/UL (ref 4.2–5.4)
RBC # BLD: 4.84 M/UL (ref 4.2–5.4)
SODIUM BLD-SCNC: 138 MEQ/L (ref 132–144)
WBC # BLD: 8.1 K/UL (ref 4.8–10.8)
WBC # BLD: 9.2 K/UL (ref 4.8–10.8)

## 2018-07-20 PROCEDURE — 6360000002 HC RX W HCPCS: Performed by: INTERNAL MEDICINE

## 2018-07-20 PROCEDURE — 6370000000 HC RX 637 (ALT 250 FOR IP): Performed by: INTERNAL MEDICINE

## 2018-07-20 PROCEDURE — 85025 COMPLETE CBC W/AUTO DIFF WBC: CPT

## 2018-07-20 PROCEDURE — 36591 DRAW BLOOD OFF VENOUS DEVICE: CPT

## 2018-07-20 PROCEDURE — S0028 INJECTION, FAMOTIDINE, 20 MG: HCPCS | Performed by: INTERNAL MEDICINE

## 2018-07-20 PROCEDURE — 2580000003 HC RX 258: Performed by: INTERNAL MEDICINE

## 2018-07-20 PROCEDURE — 99232 SBSQ HOSP IP/OBS MODERATE 35: CPT | Performed by: INTERNAL MEDICINE

## 2018-07-20 PROCEDURE — 6360000002 HC RX W HCPCS: Performed by: NURSE PRACTITIONER

## 2018-07-20 PROCEDURE — 2500000003 HC RX 250 WO HCPCS: Performed by: INTERNAL MEDICINE

## 2018-07-20 PROCEDURE — 80048 BASIC METABOLIC PNL TOTAL CA: CPT

## 2018-07-20 PROCEDURE — 6370000000 HC RX 637 (ALT 250 FOR IP): Performed by: ANESTHESIOLOGY

## 2018-07-20 PROCEDURE — 1210000000 HC MED SURG R&B

## 2018-07-20 PROCEDURE — 93010 ELECTROCARDIOGRAM REPORT: CPT | Performed by: INTERNAL MEDICINE

## 2018-07-20 PROCEDURE — 2580000003 HC RX 258: Performed by: NURSE PRACTITIONER

## 2018-07-20 RX ORDER — METOPROLOL TARTRATE 50 MG/1
50 TABLET, FILM COATED ORAL 3 TIMES DAILY
Qty: 60 TABLET | Refills: 3 | Status: SHIPPED | OUTPATIENT
Start: 2018-07-20 | End: 2019-02-28

## 2018-07-20 RX ORDER — ASPIRIN 81 MG/1
81 TABLET ORAL DAILY
Qty: 30 TABLET | Refills: 3 | Status: SHIPPED | OUTPATIENT
Start: 2018-07-21 | End: 2019-02-28

## 2018-07-20 RX ADMIN — KETOROLAC TROMETHAMINE 15 MG: 15 INJECTION, SOLUTION INTRAMUSCULAR; INTRAVENOUS at 17:22

## 2018-07-20 RX ADMIN — METOPROLOL TARTRATE 50 MG: 50 TABLET ORAL at 14:01

## 2018-07-20 RX ADMIN — KETOROLAC TROMETHAMINE 15 MG: 15 INJECTION, SOLUTION INTRAMUSCULAR; INTRAVENOUS at 03:28

## 2018-07-20 RX ADMIN — ENOXAPARIN SODIUM 40 MG: 100 INJECTION SUBCUTANEOUS at 09:44

## 2018-07-20 RX ADMIN — LACTOBACILLUS TAB 1 TABLET: TAB at 23:37

## 2018-07-20 RX ADMIN — FAMOTIDINE 20 MG: 10 INJECTION INTRAVENOUS at 23:38

## 2018-07-20 RX ADMIN — OXYCODONE HYDROCHLORIDE AND ACETAMINOPHEN 1 TABLET: 5; 325 TABLET ORAL at 19:45

## 2018-07-20 RX ADMIN — OXYCODONE HYDROCHLORIDE AND ACETAMINOPHEN 1 TABLET: 5; 325 TABLET ORAL at 05:51

## 2018-07-20 RX ADMIN — KETOROLAC TROMETHAMINE 15 MG: 15 INJECTION, SOLUTION INTRAMUSCULAR; INTRAVENOUS at 23:58

## 2018-07-20 RX ADMIN — FAMOTIDINE 20 MG: 10 INJECTION INTRAVENOUS at 09:44

## 2018-07-20 RX ADMIN — Medication 10 ML: at 09:45

## 2018-07-20 RX ADMIN — Medication 10 ML: at 23:39

## 2018-07-20 RX ADMIN — PIPERACILLIN SODIUM AND TAZOBACTAM SODIUM 3.38 G: 3; .375 INJECTION, POWDER, LYOPHILIZED, FOR SOLUTION INTRAVENOUS at 03:27

## 2018-07-20 RX ADMIN — ONDANSETRON HYDROCHLORIDE 4 MG: 2 INJECTION, SOLUTION INTRAMUSCULAR; INTRAVENOUS at 03:28

## 2018-07-20 RX ADMIN — LACTOBACILLUS TAB 1 TABLET: TAB at 09:45

## 2018-07-20 RX ADMIN — FERROUS SULFATE TAB 325 MG (65 MG ELEMENTAL FE) 325 MG: 325 (65 FE) TAB at 09:45

## 2018-07-20 RX ADMIN — ONDANSETRON HYDROCHLORIDE 4 MG: 2 INJECTION, SOLUTION INTRAMUSCULAR; INTRAVENOUS at 09:44

## 2018-07-20 RX ADMIN — KETOROLAC TROMETHAMINE 15 MG: 15 INJECTION, SOLUTION INTRAMUSCULAR; INTRAVENOUS at 09:44

## 2018-07-20 RX ADMIN — OXYCODONE HYDROCHLORIDE AND ACETAMINOPHEN 1 TABLET: 5; 325 TABLET ORAL at 13:48

## 2018-07-20 RX ADMIN — ONDANSETRON HYDROCHLORIDE 4 MG: 2 INJECTION, SOLUTION INTRAMUSCULAR; INTRAVENOUS at 19:45

## 2018-07-20 RX ADMIN — PIPERACILLIN SODIUM AND TAZOBACTAM SODIUM 3.38 G: 3; .375 INJECTION, POWDER, LYOPHILIZED, FOR SOLUTION INTRAVENOUS at 09:58

## 2018-07-20 RX ADMIN — PIPERACILLIN SODIUM AND TAZOBACTAM SODIUM 3.38 G: 3; .375 INJECTION, POWDER, LYOPHILIZED, FOR SOLUTION INTRAVENOUS at 23:38

## 2018-07-20 RX ADMIN — FERROUS SULFATE TAB 325 MG (65 MG ELEMENTAL FE) 325 MG: 325 (65 FE) TAB at 18:08

## 2018-07-20 RX ADMIN — PIPERACILLIN SODIUM AND TAZOBACTAM SODIUM 3.38 G: 3; .375 INJECTION, POWDER, LYOPHILIZED, FOR SOLUTION INTRAVENOUS at 14:02

## 2018-07-20 RX ADMIN — ASPIRIN 81 MG: 81 TABLET, COATED ORAL at 09:44

## 2018-07-20 RX ADMIN — METOPROLOL TARTRATE 50 MG: 50 TABLET ORAL at 23:38

## 2018-07-20 ASSESSMENT — PAIN SCALES - GENERAL
PAINLEVEL_OUTOF10: 9
PAINLEVEL_OUTOF10: 10
PAINLEVEL_OUTOF10: 9
PAINLEVEL_OUTOF10: 10

## 2018-07-20 ASSESSMENT — ENCOUNTER SYMPTOMS
EYES NEGATIVE: 1
BLOOD IN STOOL: 0
CHEST TIGHTNESS: 0
COUGH: 0
RESPIRATORY NEGATIVE: 1
STRIDOR: 0
NAUSEA: 0
GASTROINTESTINAL NEGATIVE: 1
WHEEZING: 0
SHORTNESS OF BREATH: 0

## 2018-07-20 ASSESSMENT — PAIN DESCRIPTION - PROGRESSION: CLINICAL_PROGRESSION: GRADUALLY IMPROVING

## 2018-07-20 ASSESSMENT — PAIN DESCRIPTION - FREQUENCY: FREQUENCY: CONTINUOUS

## 2018-07-20 ASSESSMENT — PAIN DESCRIPTION - ONSET: ONSET: ON-GOING

## 2018-07-20 ASSESSMENT — PAIN DESCRIPTION - DESCRIPTORS: DESCRIPTORS: ACHING

## 2018-07-20 ASSESSMENT — PAIN DESCRIPTION - LOCATION: LOCATION: JAW

## 2018-07-20 ASSESSMENT — PAIN DESCRIPTION - PAIN TYPE: TYPE: ACUTE PAIN

## 2018-07-20 NOTE — DISCHARGE SUMMARY
Hospital Medicine Discharge Summary    Patient: Melvin Cruz     MRN: [id number]  Gender: female  : 1975   Age: 37 y.o. Code Status: Full Code     Admit Date: 2018   Discharge Date:   2018  Disposition:        Primary Care Provider: Alex Gold MD    Admitting Physician: Nata Crowe MD  Discharge Physician: Ismael Barrow MD       Discharge Diagnoses:  1.  palpitation. Ventricular ectopy. Echocardiogram showed normal left ventricular ejection fraction. Normal TSH magnesium and potassium. .  Small PFO  . Jarrell Shells dental/mandibular abscess with left face cellulitis status post incision and drainage.  Cultures showed light growth of bacteroids  Patient is on  Zosyn per ID recommendations  .  Anemia status post blood transfusion.  Hemoglobin improved. .  Hypokalemia improved. Hospital Course: The patient was found to have mandibular abscess. IV antibiotic therapy is essentially is initiated with broad-spectrum coverage. Patient was seen by oral surgery who recommended and performed ID. Patient has been recovering from after the surgery. She was having frequent ventricular ectopy. Echocardiogram showed normal LV function, small PFO. Cardiology is consulted and recommended a small dose beta blocker and baby aspirin and follow up as outpatient the patient may need outpatient electrophysiology studies. Wound cultures drawn that the bacterioids have been watering from the wound and they abscess. ID recommended IV Zosyn and patient had PICC line she will be discharged with PICC line and outpatient IV infusion therapy for long-term antibiotic therapy    Pain is under control. Swelling improved.   Patient has no fever    Patient required 1 unit of blood transfusion after surgery    Patient will be discharged today if she is cleared by oral surgery and to follow up with oral surgery and ID      Discharge Medications:   Current Discharge Medication List      START taking these advanced into the vein with fluoroscopic guidance and a sheath was placed over the guidewire. A 5-Icelandic 41 cm dual lumen PICC was advanced through the sheath, up the arm and into the central vasculature. It was positioned appropriately. The sheath was removed. The catheter was shown to aspirate and infuse properly. The flange of the catheter  was affixed to the arm using a PICC securement device. A spot image of the chest showed the tip of the PICC line to lie in the cavoatrial junction. The patient tolerated the procedure well and without complications. 1.69 mGy of fluoroscopy was used, with a single still saved. No diagnostic images were obtained. SUCCESSFUL PICC PLACEMENT WITHOUT IMMEDIATE COMPLICATIONS. Ir Ultrasound Guidance Vascular Access    Result Date: 7/17/2018  IR ULTRASOUND GUIDANCE VASCULAR ACCESS, IR PICC WO SQ PORT/PUMP > 5 YEARS, IR FLUORO GUIDED CVA DEVICE PLACEMENT: 7/17/2018 CLINICAL HISTORY:  PICC insertion for IV antibiotics for dental disease and facial cellulitis. Central venous catheter was inserted using a maximal sterile barrier technique which includes cap, mask, sterile gown, sterile gloves, sterile full-body drape, hand hygiene and 2% chlorhexidine for cutaneous antisepsis. A sterile ultrasound technique with sterile gel and sterile probe covers was also utilized. After discussing the procedure and possible complications with the patient, informed consent was obtained. The patient was placed on the Special Procedures table. The right upper extremity was sterilely prepared using 2% chlorhexidine and then draped with sterile towels and a body-sized sterile drape. All personnel in the Special Procedures Room donned caps and surgical masks. In addition, the  and first assistant donned sterile gowns and gloves after proper hand cleansing. A pre-procedure time out was performed in order to assure the correct patient and procedure.   Local anesthetic was

## 2018-07-20 NOTE — PROGRESS NOTES
Progress Note  Patient: Madonna Brooks  Unit/Bed: X209/W686-34  YOB: 1975  MRN: [id number]  Acct: [de-identified]   Admitting Diagnosis: Dental abscess [K04.7]  Dental abscess [K04.7]  Admit Date:  7/9/2018  Hospital Day: 10    Chief Complaint: dental abscess Ectopy Palpitations    Histories:  History reviewed. No pertinent past medical history. Past Surgical History:   Procedure Laterality Date    [place] DENTAL SURGERY PROCEDURE Left 7/12/2018    REMOVAL OF TOOTH #18 WITH IRRIGARION OF ABSCESS performed by Navarro Beltrán DDS at [address] [place] [address] N/A 7/15/2018    DENTAL EXTRACTION performed by Navarro Beltrán DDS at [facility]     History reviewed. No pertinent family history. Social History     Social History    Marital status: Single     Spouse name: N/A    Number of children: N/A    Years of education: N/A     Social History Main Topics    Smoking status: Never Smoker    Smokeless tobacco: Never Used    Alcohol use No    Drug use: No    Sexual activity: Not Asked     Other Topics Concern    None     Social History Narrative    None       Subjective/HPI  Does not feel any palpitations no F  Breathing good    EKG: SR no more Ventricular ectopy noted. Has had runs of Atial ectopy however        Review of Systems:   Review of Systems   Constitutional: Negative. Negative for diaphoresis and fatigue. HENT: Negative. Eyes: Negative. Respiratory: Negative. Negative for cough, chest tightness, shortness of breath, wheezing and stridor. Cardiovascular: Positive for palpitations. Negative for chest pain and leg swelling. Gastrointestinal: Negative. Negative for blood in stool and nausea. Genitourinary: Negative. Musculoskeletal: Negative. Skin: Negative. Neurological: Negative. Negative for dizziness, syncope, weakness and light-headedness. Hematological: Negative. Psychiatric/Behavioral: Negative.           Physical Examination:    BP 111/75   Pulse 62   Temp 98.4 °F (36.9 °C) (Oral)   Resp 16   Ht 5' 3\" (1.6 m)   Wt 194 lb 14.2 oz (88.4 kg)   LMP 07/05/2018   SpO2 100%   BMI 34.52 kg/m²    Physical Exam   Constitutional: She appears healthy. No distress. HENT:   Normal cephalic and Atraumatic   Eyes: Pupils are equal, round, and reactive to light. Neck: Normal range of motion and thyroid normal. Neck supple. No JVD present. No neck adenopathy. No thyromegaly present. Cardiovascular: Normal rate, regular rhythm, normal heart sounds, intact distal pulses and normal pulses. Pulmonary/Chest: Effort normal and breath sounds normal. She has no wheezes. She has no rales. She exhibits no tenderness. Abdominal: Soft. Bowel sounds are normal. There is no tenderness. Musculoskeletal: Normal range of motion. She exhibits no edema or tenderness. Neurological: She is alert and oriented to person, place, and time. Skin: Skin is warm. No cyanosis. Nails show no clubbing.        LABS:  CBC:   Lab Results   Component Value Date    WBC 8.1 07/20/2018    RBC 4.84 07/20/2018    HGB 10.8 07/20/2018    HCT 33.7 07/20/2018    MCV 69.6 07/20/2018    MCH 22.3 07/20/2018    MCHC 32.0 07/20/2018    RDW 28.0 07/20/2018     07/20/2018     CBC with Differential:    Lab Results   Component Value Date    WBC 8.1 07/20/2018    RBC 4.84 07/20/2018    HGB 10.8 07/20/2018    HCT 33.7 07/20/2018     07/20/2018    MCV 69.6 07/20/2018    MCH 22.3 07/20/2018    MCHC 32.0 07/20/2018    RDW 28.0 07/20/2018    METASPCT 2 07/19/2018    LYMPHOPCT 20.3 07/20/2018    MONOPCT 7.4 07/20/2018    MYELOPCT 2 07/16/2018    BASOPCT 1.0 07/20/2018    MONOSABS 0.6 07/20/2018    LYMPHSABS 1.6 07/20/2018    EOSABS 0.3 07/20/2018    BASOSABS 0.1 07/20/2018     CMP:    Lab Results   Component Value Date     07/20/2018    K 4.3 07/20/2018    K 3.6 07/10/2018     07/20/2018    CO2 27 07/20/2018    BUN 8 07/20/2018    CREATININE 0.69 07/20/2018    GFRAA >60.0 07/20/2018    LABGLOM >60.0 07/20/2018    GLUCOSE 91 07/20/2018    PROT 7.4 07/09/2018    LABALBU 3.9 07/09/2018    CALCIUM 8.6 07/20/2018    BILITOT 0.5 07/09/2018    ALKPHOS 103 07/09/2018    AST 16 07/09/2018    ALT 16 07/09/2018     BMP:    Lab Results   Component Value Date     07/20/2018    K 4.3 07/20/2018    K 3.6 07/10/2018     07/20/2018    CO2 27 07/20/2018    BUN 8 07/20/2018    LABALBU 3.9 07/09/2018    CREATININE 0.69 07/20/2018    CALCIUM 8.6 07/20/2018    GFRAA >60.0 07/20/2018    LABGLOM >60.0 07/20/2018    GLUCOSE 91 07/20/2018     Magnesium:    Lab Results   Component Value Date    MG 2.3 07/18/2018     Troponin:  No results found for: [facility] Problems    Diagnosis Date Noted    Abscess of chin [L02.01]      Priority: Low    Facial pain, acute [R51] 07/17/2018     Priority: Low    Facial cellulitis [G68.825]      Priority: Low    Dental abscess [K04.7] 07/09/2018     Priority: Low        Assessment/Plan:  1. Tooth abscess- improving. No F.  WBC stable  2. Echo EF 65% no wall motion AbN. Small PFO seen  3. Ventricular Ectopy-resolved  with BB. Noted atrial ectopy. Pt asymptomatic.  continue ASA  4. Mag K TSH all WNL  5. OK for dc home  6. F/u Cardio 2 weeks  7.  Will plan for out pt Holter and EP eval as needed.          Electronically signed by Susan Nelson MD on 7/20/2018 at 10:07 AM

## 2018-07-21 VITALS
TEMPERATURE: 98.2 F | WEIGHT: 190 LBS | BODY MASS INDEX: 33.66 KG/M2 | DIASTOLIC BLOOD PRESSURE: 77 MMHG | HEART RATE: 65 BPM | OXYGEN SATURATION: 100 % | SYSTOLIC BLOOD PRESSURE: 114 MMHG | RESPIRATION RATE: 16 BRPM | HEIGHT: 63 IN

## 2018-07-21 LAB
ANION GAP SERPL CALCULATED.3IONS-SCNC: 16 MEQ/L (ref 7–13)
BASOPHILS ABSOLUTE: 0.1 K/UL (ref 0–0.2)
BASOPHILS RELATIVE PERCENT: 1.2 %
BUN BLDV-MCNC: 8 MG/DL (ref 6–20)
CALCIUM SERPL-MCNC: 8.5 MG/DL (ref 8.6–10.2)
CHLORIDE BLD-SCNC: 105 MEQ/L (ref 98–107)
CO2: 23 MEQ/L (ref 22–29)
CREAT SERPL-MCNC: 1.06 MG/DL (ref 0.5–0.9)
EOSINOPHILS ABSOLUTE: 0.2 K/UL (ref 0–0.7)
EOSINOPHILS RELATIVE PERCENT: 2.4 %
GFR AFRICAN AMERICAN: >60
GFR NON-AFRICAN AMERICAN: 56.5
GLUCOSE BLD-MCNC: 90 MG/DL (ref 74–109)
HCT VFR BLD CALC: 32.1 % (ref 37–47)
HEMOGLOBIN: 10 G/DL (ref 12–16)
LYMPHOCYTES ABSOLUTE: 2 K/UL (ref 1–4.8)
LYMPHOCYTES RELATIVE PERCENT: 24.6 %
MCH RBC QN AUTO: 22.2 PG (ref 27–31.3)
MCHC RBC AUTO-ENTMCNC: 31.3 % (ref 33–37)
MCV RBC AUTO: 70.8 FL (ref 82–100)
MONOCYTES ABSOLUTE: 0.6 K/UL (ref 0.2–0.8)
MONOCYTES RELATIVE PERCENT: 7.5 %
NEUTROPHILS ABSOLUTE: 5.2 K/UL (ref 1.4–6.5)
NEUTROPHILS RELATIVE PERCENT: 64.3 %
PDW BLD-RTO: 28 % (ref 11.5–14.5)
PLATELET # BLD: 276 K/UL (ref 130–400)
POTASSIUM SERPL-SCNC: 3.3 MEQ/L (ref 3.5–5.1)
RBC # BLD: 4.54 M/UL (ref 4.2–5.4)
SODIUM BLD-SCNC: 144 MEQ/L (ref 132–144)
WBC # BLD: 8.1 K/UL (ref 4.8–10.8)

## 2018-07-21 PROCEDURE — 6360000002 HC RX W HCPCS: Performed by: INTERNAL MEDICINE

## 2018-07-21 PROCEDURE — 6370000000 HC RX 637 (ALT 250 FOR IP): Performed by: INTERNAL MEDICINE

## 2018-07-21 PROCEDURE — S0028 INJECTION, FAMOTIDINE, 20 MG: HCPCS | Performed by: INTERNAL MEDICINE

## 2018-07-21 PROCEDURE — 85025 COMPLETE CBC W/AUTO DIFF WBC: CPT

## 2018-07-21 PROCEDURE — 2580000003 HC RX 258: Performed by: INTERNAL MEDICINE

## 2018-07-21 PROCEDURE — 99232 SBSQ HOSP IP/OBS MODERATE 35: CPT | Performed by: INTERNAL MEDICINE

## 2018-07-21 PROCEDURE — 36591 DRAW BLOOD OFF VENOUS DEVICE: CPT

## 2018-07-21 PROCEDURE — 6360000002 HC RX W HCPCS: Performed by: NURSE PRACTITIONER

## 2018-07-21 PROCEDURE — 2500000003 HC RX 250 WO HCPCS: Performed by: INTERNAL MEDICINE

## 2018-07-21 PROCEDURE — 2580000003 HC RX 258: Performed by: NURSE PRACTITIONER

## 2018-07-21 PROCEDURE — 6370000000 HC RX 637 (ALT 250 FOR IP): Performed by: ANESTHESIOLOGY

## 2018-07-21 PROCEDURE — 80048 BASIC METABOLIC PNL TOTAL CA: CPT

## 2018-07-21 RX ORDER — TRAMADOL HYDROCHLORIDE 50 MG/1
50 TABLET ORAL EVERY 6 HOURS PRN
Status: DISCONTINUED | OUTPATIENT
Start: 2018-07-21 | End: 2018-07-21 | Stop reason: HOSPADM

## 2018-07-21 RX ORDER — TRAMADOL HYDROCHLORIDE 50 MG/1
50 TABLET ORAL EVERY 6 HOURS PRN
Qty: 30 TABLET | Refills: 0 | Status: SHIPPED | OUTPATIENT
Start: 2018-07-21 | End: 2018-07-31

## 2018-07-21 RX ORDER — POTASSIUM CHLORIDE 20 MEQ/1
40 TABLET, EXTENDED RELEASE ORAL ONCE
Status: COMPLETED | OUTPATIENT
Start: 2018-07-21 | End: 2018-07-21

## 2018-07-21 RX ADMIN — POTASSIUM CHLORIDE 40 MEQ: 1500 TABLET, EXTENDED RELEASE ORAL at 12:55

## 2018-07-21 RX ADMIN — ASPIRIN 81 MG: 81 TABLET, COATED ORAL at 08:47

## 2018-07-21 RX ADMIN — FERROUS SULFATE TAB 325 MG (65 MG ELEMENTAL FE) 325 MG: 325 (65 FE) TAB at 08:47

## 2018-07-21 RX ADMIN — FAMOTIDINE 20 MG: 10 INJECTION INTRAVENOUS at 08:48

## 2018-07-21 RX ADMIN — OXYCODONE HYDROCHLORIDE AND ACETAMINOPHEN 1 TABLET: 5; 325 TABLET ORAL at 09:00

## 2018-07-21 RX ADMIN — LACTOBACILLUS TAB 1 TABLET: TAB at 08:48

## 2018-07-21 RX ADMIN — ONDANSETRON HYDROCHLORIDE 4 MG: 2 INJECTION, SOLUTION INTRAMUSCULAR; INTRAVENOUS at 08:48

## 2018-07-21 RX ADMIN — METOPROLOL TARTRATE 50 MG: 50 TABLET ORAL at 08:48

## 2018-07-21 RX ADMIN — OXYCODONE HYDROCHLORIDE AND ACETAMINOPHEN 1 TABLET: 5; 325 TABLET ORAL at 01:52

## 2018-07-21 RX ADMIN — ENOXAPARIN SODIUM 40 MG: 100 INJECTION SUBCUTANEOUS at 08:47

## 2018-07-21 RX ADMIN — KETOROLAC TROMETHAMINE 15 MG: 15 INJECTION, SOLUTION INTRAMUSCULAR; INTRAVENOUS at 06:17

## 2018-07-21 RX ADMIN — KETOROLAC TROMETHAMINE 15 MG: 15 INJECTION, SOLUTION INTRAMUSCULAR; INTRAVENOUS at 12:55

## 2018-07-21 RX ADMIN — PIPERACILLIN SODIUM AND TAZOBACTAM SODIUM 3.38 G: 3; .375 INJECTION, POWDER, LYOPHILIZED, FOR SOLUTION INTRAVENOUS at 12:55

## 2018-07-21 RX ADMIN — Medication 10 ML: at 08:50

## 2018-07-21 RX ADMIN — PIPERACILLIN SODIUM AND TAZOBACTAM SODIUM 3.38 G: 3; .375 INJECTION, POWDER, LYOPHILIZED, FOR SOLUTION INTRAVENOUS at 06:17

## 2018-07-21 ASSESSMENT — PAIN SCALES - GENERAL
PAINLEVEL_OUTOF10: 10
PAINLEVEL_OUTOF10: 0
PAINLEVEL_OUTOF10: 9
PAINLEVEL_OUTOF10: 10
PAINLEVEL_OUTOF10: 8
PAINLEVEL_OUTOF10: 10
PAINLEVEL_OUTOF10: 10

## 2018-07-21 ASSESSMENT — ENCOUNTER SYMPTOMS: RESPIRATORY NEGATIVE: 1

## 2018-07-21 NOTE — OP NOTE
[address]                       [address] [place], [address] [facility]                                 OPERATIVE REPORT    PATIENT NAME: Nia Cao                   :        1975  MED REC NO:   [id number]                            ROOM:       F4  ACCOUNT NO:   [de-identified]                           ADMIT DATE: 2018  PROVIDER:     French Spatz, Dds    Corrected Copy 2018    DATE OF PROCEDURE:  2018    PREOPERATIVE DIAGNOSIS:  Caries abscess tooth #19 with associated  vestibular space abscess in area of #19. POSTOPERATIVE DIAGNOSIS:  Caries abscess tooth #19 with associated  vestibular space abscess in area of #19. OPERATION PERFORMED:  Removal of tooth #19, I and D of vestibular space. OPERATIVE PROCEDURE:  In supine position, the patient was prepped and  draped in usual manner. Throat pack was placed. Via elevators and  forceps, tooth #19 was surgically removed. The socket was irrigated and  curetted. The vestibular space was entered and drained out purulent  material.  The vestibular space was irrigated with clindamycin irrigation  and closure was accomplished with 3-0 chromic suture. Throat packs were  removed. Oropharyngeal suction accomplished. Intraoral gauze pressure  packs placed with airway. Estimated blood loss was 50 mL. Complications  none. The patient went to the [address] [place] [address] in good condition.       Mechelle Singh DDS    D: 2018 11:44:44       T: 2018 14:21:34     [id number]  Job#: [id number]     Doc#: [id number]    CC:
surgical bur, the bone overlying the area of the masses of the  mandible in 18 and 19 was removed exposing a hard tooth-like mass in both  areas. 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 A  curettage in the mandible was accomplished in areas 18 and 19. The masses  were excised and submitted to Pathology for biopsy. The defect was packed  with Gelfoam soaked in thrombin. Closure was accomplished in layers with  2-0 black silk suture. Going to the symphysis area, incision was made in  the vestibular space of the symphysis of the mandible. The incision was  carried through the mucosa to the superficial surface of the mandible. Upon making the incision, purulent exudate was returned. With a blunt  dissection, the area was opened and adequate drainage established. C and S  was taken of the area and submitted to Pathology for aerobic and anaerobic  evaluation. A 1/4-inch Penrose drain was placed to ensure adequate  drainage. The areas were then irrigated with clindamycin irrigation. Throat pack was removed. Oropharyngeal suction accomplished. Intraoral  gauze pressure packs placed with airway. Estimated blood loss was 100 mL. Complications none. The patient went to [facility] in good condition.         Trevin Martell DDS    D: 07/20/2018 11:33:04       T: 07/20/2018 15:03:54     [id number]  Job#: [id number]     Doc#: [id number]    CC:

## 2018-07-21 NOTE — PROGRESS NOTES
Jersey Agrawal is a 37 y.o. female patient.     Abscess of mandible from dental source with Bacteroides    Surgical debridement done 7/15    Current Facility-Administered Medications   Medication Dose Route Frequency Provider Last Rate Last Dose    traMADol (ULTRAM) tablet 50 mg  50 mg Oral Q6H PRN Grazyna San MD        aspirin EC tablet 81 mg  81 mg Oral Daily Britney Patterson MD   81 mg at 07/21/18 0847    metoprolol tartrate (LOPRESSOR) tablet 50 mg  50 mg Oral TID Britney Patterson MD   50 mg at 07/21/18 0848    lidocaine (LMX) 4 % cream   Topical PRN Yousif Hernandez MD        famotidine (PEPCID) injection 20 mg  20 mg Intravenous BID Ginny Black MD   20 mg at 07/21/18 0848    calcium carbonate (TUMS) chewable tablet 500 mg  500 mg Oral BID PRN Ginny Black MD   500 mg at 07/17/18 0354    ketorolac (TORADOL) injection 15 mg  15 mg Intravenous Q6H PRN Grazyna San MD   15 mg at 07/21/18 1255    ferrous sulfate tablet 325 mg  325 mg Oral BID  Toma Schwarz MD   325 mg at 07/21/18 0847    piperacillin-tazobactam (ZOSYN) 3.375 g in dextrose 5 % 50 mL IVPB (mini-bag)  3.375 g Intravenous Q6H Margie Scales  mL/hr at 07/21/18 1255 3.375 g at 07/21/18 1255    lactobacillus acidophilus [facility]) 1 tablet  1 tablet Oral BID Chantal Cooper MD   1 tablet at 07/21/18 0848    acetaminophen (TYLENOL) tablet 650 mg  650 mg Oral Q4H PRN Neisha Kline PA-C   650 mg at 07/10/18 1613    sodium chloride flush 0.9 % injection 10 mL  10 mL Intravenous 2 times per day Jacinto Saltness, APRN - CNP   10 mL at 07/21/18 0850    sodium chloride flush 0.9 % injection 10 mL  10 mL Intravenous PRN Jacinto Saltness, APRN - CNP        magnesium hydroxide (MILK OF MAGNESIA) 400 MG/5ML suspension 30 mL  30 mL Oral Daily PRN Jacinto Saltness, APRN - CNP   30 mL at 07/14/18 2148    ondansetron (ZOFRAN) injection 4 mg  4 mg Intravenous Q6H PRN Jacinto Saltness, APRN - CNP   4 mg at 07/21/18 0894    enoxaparin (LOVENOX) injection 40 mg  40 mg Subcutaneous Daily MARIA EUGENIA Pitt CNP   40 mg at 07/21/18 0847     No Known Allergies  Active Problems:    Dental abscess    Facial cellulitis    Facial pain, acute    Abscess of chin  Resolved Problems:    * No resolved hospital problems. *    Blood pressure (!) 96/54, pulse 66, temperature 98.7 °F (37.1 °C), temperature source Oral, resp. rate 16, height 5' 3\" (1.6 m), weight 190 lb (86.2 kg), last menstrual period 07/05/2018, SpO2 98 %. Subjective     Review of Systems   Constitutional: Negative. Respiratory: Negative. Cardiovascular: Negative. Genitourinary: Negative. Skin: Negative. Neurological: Negative. Objective:  Vital signs: (most recent): Blood pressure (!) 96/54, pulse 66, temperature 98.7 °F (37.1 °C), temperature source Oral, resp. rate 16, height 5' 3\" (1.6 m), weight 190 lb (86.2 kg), last menstrual period 07/05/2018, SpO2 98 %. feels well eating without difficulty swallowing without difficulty    Neck soft supple some swelling in the right mandibular region    S1-S2 no murmur  Lungs are clear all station percussion  Abdomen soft nontender without masses    Surgical Culture [[id number]] (Abnormal) Collected: 07/15/18 0854   Order Status: Completed Specimen: Swab from Mouth Updated: 07/18/18 0944    Gram Stain Result No WBC's   Rare Gram positive cocci   Rare Gram negative rods     Culture Surgical Light growth normal respiratory waldo. Organism Bacteroides fragilis group (A)    Anaerobic Culture --    Moderate growth   Beta Lactamase POSITIVE. Sensitivities not routinely done. Drugs of choice are: Metronidazole,   Cefoxitin, or Piperacillin/Tazobactam.    Narrative:     ORDER#: [id number]                          ORDERED BY: GIRISH RUIZ  SOURCE: Abscess Mandibular Abscess #2      COLLECTED:  07/15/18 08:54  ANTIBIOTICS AT [facility]. :                      RECEIVED :  07/15/18 13:16   Surgical Culture [[id number]] (Abnormal)

## 2018-07-21 NOTE — FLOWSHEET NOTE
Pt was crying and upset with friends in the room earlier in the evening because she really wanted to go home today. Reassurance given, continuing pain med regimen as ordered. Pt and friend were standing outside another patients room who I was giving meds to and asked for a wheelchair to \"wheel around the unit\", PCA provided her a wheelchair and I was informed that patient left floor via wheelchair with her friend, supervisor called, logging whereabouts and having discussion on patients return.  Her belongings are still in her room Electronically signed by Eber Andrea RN on 7/20/2018 at 10:51 PM

## 2018-07-24 ENCOUNTER — TELEPHONE (OUTPATIENT)
Dept: INFECTIOUS DISEASES | Age: 43
End: 2018-07-24

## 2018-07-25 LAB
ANISOCYTOSIS: ABNORMAL
BASOPHILS ABSOLUTE: 0.1 K/UL (ref 0–0.2)
BASOPHILS RELATIVE PERCENT: 1.5 %
C-REACTIVE PROTEIN: 6.9 MG/L (ref 0–5)
EOSINOPHILS ABSOLUTE: 0.3 K/UL (ref 0–0.7)
EOSINOPHILS RELATIVE PERCENT: 4.9 %
HCT VFR BLD CALC: 35.3 % (ref 37–47)
HEMOGLOBIN: 11.2 G/DL (ref 12–16)
HYPOCHROMIA: ABNORMAL
LYMPHOCYTES ABSOLUTE: 1.3 K/UL (ref 1–4.8)
LYMPHOCYTES RELATIVE PERCENT: 19 %
MCH RBC QN AUTO: 22.6 PG (ref 27–31.3)
MCHC RBC AUTO-ENTMCNC: 31.9 % (ref 33–37)
MCV RBC AUTO: 70.9 FL (ref 82–100)
MICROCYTES: ABNORMAL
MONOCYTES ABSOLUTE: 0.5 K/UL (ref 0.2–0.8)
MONOCYTES RELATIVE PERCENT: 6.7 %
NEUTROPHILS ABSOLUTE: 4.6 K/UL (ref 1.4–6.5)
NEUTROPHILS RELATIVE PERCENT: 67.9 %
PDW BLD-RTO: 28.6 % (ref 11.5–14.5)
PLATELET # BLD: 322 K/UL (ref 130–400)
POIKILOCYTES: ABNORMAL
RBC # BLD: 4.97 M/UL (ref 4.2–5.4)
WBC # BLD: 6.7 K/UL (ref 4.8–10.8)

## 2018-07-25 NOTE — TELEPHONE ENCOUNTER
Per Dr. Dimas Carter, pt needs CBC, CRP today. Orders given to [facility], [facility].

## 2018-07-29 ENCOUNTER — HOSPITAL ENCOUNTER (EMERGENCY)
Age: 43
Discharge: HOME | End: 2018-07-30
Attending: EMERGENCY MEDICINE
Payer: COMMERCIAL

## 2018-07-29 DIAGNOSIS — R11.11 NON-INTRACTABLE VOMITING WITHOUT NAUSEA, UNSPECIFIED VOMITING TYPE: Primary | ICD-10-CM

## 2018-07-29 DIAGNOSIS — B34.9 VIRAL ILLNESS: ICD-10-CM

## 2018-07-29 LAB
ALBUMIN SERPL-MCNC: 3.9 G/DL (ref 3.9–4.9)
ALP BLD-CCNC: 93 U/L (ref 40–130)
ALT SERPL-CCNC: 21 U/L (ref 0–33)
ANION GAP SERPL CALCULATED.3IONS-SCNC: 15 MEQ/L (ref 7–13)
AST SERPL-CCNC: 27 U/L (ref 0–35)
BILIRUB SERPL-MCNC: 0.9 MG/DL (ref 0–1.2)
BUN BLDV-MCNC: 9 MG/DL (ref 6–20)
CALCIUM SERPL-MCNC: 9.2 MG/DL (ref 8.6–10.2)
CHLORIDE BLD-SCNC: 101 MEQ/L (ref 98–107)
CO2: 20 MEQ/L (ref 22–29)
CREAT SERPL-MCNC: 0.69 MG/DL (ref 0.5–0.9)
GFR AFRICAN AMERICAN: >60
GFR NON-AFRICAN AMERICAN: >60
GLOBULIN: 4.1 G/DL (ref 2.3–3.5)
GLUCOSE BLD-MCNC: 110 MG/DL (ref 74–109)
POTASSIUM SERPL-SCNC: 3.5 MEQ/L (ref 3.5–5.1)
SEDIMENTATION RATE, ERYTHROCYTE: 31 MM (ref 0–20)
SODIUM BLD-SCNC: 136 MEQ/L (ref 132–144)
TOTAL PROTEIN: 8 G/DL (ref 6.4–8.1)

## 2018-07-29 PROCEDURE — 36415 COLL VENOUS BLD VENIPUNCTURE: CPT

## 2018-07-29 PROCEDURE — 80053 COMPREHEN METABOLIC PANEL: CPT

## 2018-07-29 PROCEDURE — 85025 COMPLETE CBC W/AUTO DIFF WBC: CPT

## 2018-07-29 PROCEDURE — 85652 RBC SED RATE AUTOMATED: CPT

## 2018-07-29 PROCEDURE — 99284 EMERGENCY DEPT VISIT MOD MDM: CPT

## 2018-07-29 PROCEDURE — 96374 THER/PROPH/DIAG INJ IV PUSH: CPT

## 2018-07-29 PROCEDURE — 6360000002 HC RX W HCPCS: Performed by: EMERGENCY MEDICINE

## 2018-07-29 PROCEDURE — 2580000003 HC RX 258: Performed by: EMERGENCY MEDICINE

## 2018-07-29 RX ORDER — 0.9 % SODIUM CHLORIDE 0.9 %
1000 INTRAVENOUS SOLUTION INTRAVENOUS ONCE
Status: COMPLETED | OUTPATIENT
Start: 2018-07-29 | End: 2018-07-30

## 2018-07-29 RX ORDER — ONDANSETRON 2 MG/ML
4 INJECTION INTRAMUSCULAR; INTRAVENOUS ONCE
Status: COMPLETED | OUTPATIENT
Start: 2018-07-29 | End: 2018-07-29

## 2018-07-29 RX ADMIN — ONDANSETRON HYDROCHLORIDE 4 MG: 2 INJECTION, SOLUTION INTRAMUSCULAR; INTRAVENOUS at 22:58

## 2018-07-29 RX ADMIN — SODIUM CHLORIDE 1000 ML: 9 INJECTION, SOLUTION INTRAVENOUS at 22:58

## 2018-07-29 ASSESSMENT — PAIN SCALES - GENERAL: PAINLEVEL_OUTOF10: 8

## 2018-07-29 ASSESSMENT — ENCOUNTER SYMPTOMS
COUGH: 0
NAUSEA: 1
ABDOMINAL PAIN: 0
ABDOMINAL DISTENTION: 0
SORE THROAT: 0
VOMITING: 1
SHORTNESS OF BREATH: 0
EYE DISCHARGE: 0
PHOTOPHOBIA: 0
WHEEZING: 0
CHEST TIGHTNESS: 0

## 2018-07-29 ASSESSMENT — PAIN DESCRIPTION - ORIENTATION: ORIENTATION: LEFT

## 2018-07-29 ASSESSMENT — PAIN DESCRIPTION - LOCATION: LOCATION: JAW

## 2018-07-29 ASSESSMENT — PAIN DESCRIPTION - PAIN TYPE: TYPE: ACUTE PAIN

## 2018-07-30 VITALS
HEIGHT: 63 IN | BODY MASS INDEX: 32.07 KG/M2 | HEART RATE: 76 BPM | WEIGHT: 181 LBS | DIASTOLIC BLOOD PRESSURE: 83 MMHG | TEMPERATURE: 99.1 F | SYSTOLIC BLOOD PRESSURE: 128 MMHG | OXYGEN SATURATION: 100 % | RESPIRATION RATE: 16 BRPM

## 2018-07-30 LAB
ANISOCYTOSIS: ABNORMAL
ANISOCYTOSIS: ABNORMAL
BASOPHILS ABSOLUTE: 0.1 K/UL (ref 0–0.2)
BASOPHILS ABSOLUTE: 0.1 K/UL (ref 0–0.2)
BASOPHILS RELATIVE PERCENT: 1 %
BASOPHILS RELATIVE PERCENT: 1.2 %
C-REACTIVE PROTEIN: 3.4 MG/L (ref 0–5)
EOSINOPHILS ABSOLUTE: 0.1 K/UL (ref 0–0.7)
EOSINOPHILS ABSOLUTE: 0.2 K/UL (ref 0–0.7)
EOSINOPHILS RELATIVE PERCENT: 1 %
EOSINOPHILS RELATIVE PERCENT: 3.2 %
HCT VFR BLD CALC: 35.9 % (ref 37–47)
HCT VFR BLD CALC: 36.3 % (ref 37–47)
HEMOGLOBIN: 11.4 G/DL (ref 12–16)
HEMOGLOBIN: 11.5 G/DL (ref 12–16)
HYPOCHROMIA: ABNORMAL
HYPOCHROMIA: ABNORMAL
LYMPHOCYTES ABSOLUTE: 1 K/UL (ref 1–4.8)
LYMPHOCYTES ABSOLUTE: 1.2 K/UL (ref 1–4.8)
LYMPHOCYTES RELATIVE PERCENT: 15.5 %
LYMPHOCYTES RELATIVE PERCENT: 17.9 %
MCH RBC QN AUTO: 22.4 PG (ref 27–31.3)
MCH RBC QN AUTO: 22.6 PG (ref 27–31.3)
MCHC RBC AUTO-ENTMCNC: 31.4 % (ref 33–37)
MCHC RBC AUTO-ENTMCNC: 31.9 % (ref 33–37)
MCV RBC AUTO: 70.2 FL (ref 82–100)
MCV RBC AUTO: 72 FL (ref 82–100)
MICROCYTES: ABNORMAL
MICROCYTES: ABNORMAL
MONOCYTES ABSOLUTE: 0.5 K/UL (ref 0.2–0.8)
MONOCYTES ABSOLUTE: 0.7 K/UL (ref 0.2–0.8)
MONOCYTES RELATIVE PERCENT: 8.6 %
MONOCYTES RELATIVE PERCENT: 8.6 %
NEUTROPHILS ABSOLUTE: 3.7 K/UL (ref 1.4–6.5)
NEUTROPHILS ABSOLUTE: 5.7 K/UL (ref 1.4–6.5)
NEUTROPHILS RELATIVE PERCENT: 69.3 %
NEUTROPHILS RELATIVE PERCENT: 73.7 %
PDW BLD-RTO: 29.1 % (ref 11.5–14.5)
PDW BLD-RTO: 29.3 % (ref 11.5–14.5)
PLATELET # BLD: 282 K/UL (ref 130–400)
PLATELET # BLD: 329 K/UL (ref 130–400)
POIKILOCYTES: ABNORMAL
POIKILOCYTES: ABNORMAL
RBC # BLD: 5.04 M/UL (ref 4.2–5.4)
RBC # BLD: 5.11 M/UL (ref 4.2–5.4)
WBC # BLD: 5.4 K/UL (ref 4.8–10.8)
WBC # BLD: 7.7 K/UL (ref 4.8–10.8)

## 2018-07-30 RX ORDER — ONDANSETRON 4 MG/1
4 TABLET, ORALLY DISINTEGRATING ORAL EVERY 8 HOURS PRN
Qty: 8 TABLET | Refills: 0 | Status: SHIPPED | OUTPATIENT
Start: 2018-07-30 | End: 2019-02-28

## 2018-07-30 NOTE — ED TRIAGE NOTES
Patient presents with c/o \"Not Feeling Well\", onset today  Patient stated she had surgery x2 ago (July 12 and 15), patient was hospitalized x 2 weeks post op for antibiotic therapy  Patient is currently recieveing Zosyn q8 hours via PICC line  Patient stated she started feeling dizzy and nauseated today.  Patient stated she did vomit multiple times today  Patient alert and oriented x3  Respirations equal and unlabored  Skin tan warm dry   No active vomiting during triage

## 2018-07-30 NOTE — ED NOTES
Attempting to medicate patient. Patient's red port on dual lumen PICC able to flush 10cc of Normal Saline. No blood return noted. New IV site started. Patient tolerated well.       Melissa Quintanilla RN  07/29/18 1410

## 2018-07-30 NOTE — ED PROVIDER NOTES
[address] [facility] ED  eMERGENCY dEPARTMENT eNCOUnter      Pt Name: Estiven Gordillo  MRN: [id number]  [place] 1975  Date of evaluation: 7/29/2018  Provider: Stefani Sandhu MD    CHIEF COMPLAINT       Chief Complaint   Patient presents with    Illness     dizziness, nausea, vomiting         HISTORY OF PRESENT ILLNESS   (Location/Symptom, Timing/Onset, Context/Setting, Quality, Duration, Modifying Factors, Severity)  Note limiting factors. Estiven Gordillo is a 37 y.o. female who presents to the emergency department With complaints of dizziness nausea and vomiting. Patient reports symptoms starting approximately 6-10 hours ago. She is currently undergoing IV PICC line therapy with Zosyn for a dental abscess. She spent 13 days in the hospital and was recently discharged this past week again home on IV Zosyn. She was doing well with her recovery until this afternoon when she started feeling nauseated a little bit dizzy and had a couple episodes of vomiting. She denies any current pain. No repeat facial swelling. No related fever. HPI    Nursing Notes were reviewed. REVIEW OF SYSTEMS    (2-9 systems for level 4, 10 or more for level 5)     Review of Systems   Constitutional: Positive for fatigue. Negative for chills and diaphoresis. HENT: Negative for congestion, ear pain, mouth sores and sore throat. Eyes: Negative for photophobia and discharge. Respiratory: Negative for cough, chest tightness, shortness of breath and wheezing. Cardiovascular: Negative for chest pain and palpitations. Gastrointestinal: Positive for nausea and vomiting. Negative for abdominal distention and abdominal pain. Endocrine: Negative for cold intolerance. Genitourinary: Negative for difficulty urinating. Musculoskeletal: Negative for arthralgias. Skin: Negative for pallor and rash. Allergic/Immunologic: Negative for immunocompromised state. Neurological: Positive for dizziness.  Negative Sexual activity: Not Asked     Other Topics Concern    None     Social History Narrative    None       SCREENINGS    [place] Coma Scale  Eye Opening: Spontaneous  Best Verbal Response: Oriented  Best Motor Response: Obeys commands  [place] Coma Scale Score: 15        PHYSICAL EXAM    (up to 7 for level 4, 8 or more for level 5)     ED Triage Vitals [07/29/18 2218]   BP Temp Temp Source Pulse Resp SpO2 Height Weight   (!) 134/96 99.1 °F (37.3 °C) Oral 84 16 99 % 5' 3\" (1.6 m) 181 lb (82.1 kg)       Physical Exam   Constitutional: She is oriented to person, place, and time. She appears well-developed. No distress. HENT:   Head: Normocephalic. Nose: Nose normal.   Mouth/Throat: No oropharyngeal exudate. Eyes: Conjunctivae are normal. Pupils are equal, round, and reactive to light. Right eye exhibits no discharge. Left eye exhibits no discharge. Neck: Normal range of motion. Neck supple. Cardiovascular: Normal rate, regular rhythm, normal heart sounds and intact distal pulses. Pulmonary/Chest: Effort normal and breath sounds normal. No respiratory distress. She has no wheezes. Abdominal: Soft. Bowel sounds are normal. She exhibits no mass. There is no tenderness. There is no rebound and no guarding. Musculoskeletal: Normal range of motion. Neurological: She is alert and oriented to person, place, and time. Skin: Skin is warm and dry. Psychiatric: She has a normal mood and affect. Thought content normal.   Nursing note and vitals reviewed.       DIAGNOSTIC RESULTS     EKG: All EKG's are interpreted by the Emergency Department Physician who either signs or Co-signs this chart in the absence of a cardiologist.    RADIOLOGY:   Non-plain film images such as CT, Ultrasound and MRI are read by the radiologist. Plain radiographic images are visualized and preliminarily interpreted by the emergency physician with the below findings:        Interpretation per the Radiologist below, if available at the time of this note were completed with a voice recognition program.  Efforts were made to edit the dictations but occasionally words are mis-transcribed.)    Balbir Ayala MD (electronically signed)  Attending Emergency Physician         Balbir Ayala MD  07/30/18 0002       Balbir Ayala MD  07/30/18 9417

## 2018-08-01 ENCOUNTER — OFFICE VISIT (OUTPATIENT)
Dept: INFECTIOUS DISEASES | Age: 43
End: 2018-08-01
Payer: COMMERCIAL

## 2018-08-01 VITALS
BODY MASS INDEX: 32.6 KG/M2 | RESPIRATION RATE: 18 BRPM | HEART RATE: 90 BPM | SYSTOLIC BLOOD PRESSURE: 124 MMHG | HEIGHT: 63 IN | TEMPERATURE: 97.5 F | WEIGHT: 184 LBS | DIASTOLIC BLOOD PRESSURE: 87 MMHG

## 2018-08-01 DIAGNOSIS — R51.9 FACIAL PAIN, ACUTE: ICD-10-CM

## 2018-08-01 DIAGNOSIS — L02.01 ABSCESS OF CHIN: Primary | ICD-10-CM

## 2018-08-01 DIAGNOSIS — L03.211 FACIAL CELLULITIS: ICD-10-CM

## 2018-08-01 DIAGNOSIS — K04.7 DENTAL ABSCESS: ICD-10-CM

## 2018-08-01 PROCEDURE — 1111F DSCHRG MED/CURRENT MED MERGE: CPT | Performed by: INTERNAL MEDICINE

## 2018-08-01 PROCEDURE — G8417 CALC BMI ABV UP PARAM F/U: HCPCS | Performed by: INTERNAL MEDICINE

## 2018-08-01 PROCEDURE — G8427 DOCREV CUR MEDS BY ELIG CLIN: HCPCS | Performed by: INTERNAL MEDICINE

## 2018-08-01 PROCEDURE — 99214 OFFICE O/P EST MOD 30 MIN: CPT | Performed by: INTERNAL MEDICINE

## 2018-08-01 PROCEDURE — 1036F TOBACCO NON-USER: CPT | Performed by: INTERNAL MEDICINE

## 2018-08-03 ENCOUNTER — OFFICE VISIT (OUTPATIENT)
Dept: CARDIOLOGY CLINIC | Age: 43
End: 2018-08-03
Payer: COMMERCIAL

## 2018-08-03 VITALS
OXYGEN SATURATION: 98 % | TEMPERATURE: 98.8 F | SYSTOLIC BLOOD PRESSURE: 121 MMHG | BODY MASS INDEX: 31.89 KG/M2 | RESPIRATION RATE: 16 BRPM | DIASTOLIC BLOOD PRESSURE: 83 MMHG | WEIGHT: 180 LBS | HEIGHT: 63 IN | HEART RATE: 85 BPM

## 2018-08-03 DIAGNOSIS — I49.9 IRREGULAR HEART BEAT: ICD-10-CM

## 2018-08-03 DIAGNOSIS — Z92.89 HISTORY OF RECENT HOSPITALIZATION: Primary | ICD-10-CM

## 2018-08-03 DIAGNOSIS — R06.09 DOE (DYSPNEA ON EXERTION): ICD-10-CM

## 2018-08-03 DIAGNOSIS — R07.9 CHEST PAIN, UNSPECIFIED TYPE: ICD-10-CM

## 2018-08-03 PROCEDURE — G8417 CALC BMI ABV UP PARAM F/U: HCPCS | Performed by: INTERNAL MEDICINE

## 2018-08-03 PROCEDURE — 1111F DSCHRG MED/CURRENT MED MERGE: CPT | Performed by: INTERNAL MEDICINE

## 2018-08-03 PROCEDURE — G8428 CUR MEDS NOT DOCUMENT: HCPCS | Performed by: INTERNAL MEDICINE

## 2018-08-03 PROCEDURE — 1036F TOBACCO NON-USER: CPT | Performed by: INTERNAL MEDICINE

## 2018-08-03 PROCEDURE — 99214 OFFICE O/P EST MOD 30 MIN: CPT | Performed by: INTERNAL MEDICINE

## 2018-08-03 ASSESSMENT — ENCOUNTER SYMPTOMS
NAUSEA: 0
EYES NEGATIVE: 1
WHEEZING: 0
BLOOD IN STOOL: 0
COUGH: 0
GASTROINTESTINAL NEGATIVE: 1
STRIDOR: 0
CHEST TIGHTNESS: 0
SHORTNESS OF BREATH: 1

## 2018-08-03 NOTE — PROGRESS NOTES
Monitor 48 Hour; Future    3. ACHARYA (dyspnea on exertion)     - NM MYOCARDIAL SPECT REST EXERCISE OR RX; Future    4. Chest pain, unspecified type          Counseling:  Heart Healthy Lifestyle, Improve BMI, Low Salt Diet, Take Precautions to Prevent Falls, Regular Exercise and Walk Daily    Return for AFTER TESTS.       Electronically signed by Jael Talavera MD on 8/3/2018 at 3:42 PM

## 2018-08-06 LAB
ANISOCYTOSIS: ABNORMAL
BASOPHILS ABSOLUTE: 0 K/UL (ref 0–0.2)
BASOPHILS RELATIVE PERCENT: 0.4 %
C-REACTIVE PROTEIN: 39.3 MG/L (ref 0–5)
EOSINOPHILS ABSOLUTE: 0.3 K/UL (ref 0–0.7)
EOSINOPHILS RELATIVE PERCENT: 4.6 %
HCT VFR BLD CALC: 33.2 % (ref 37–47)
HEMOGLOBIN: 10.8 G/DL (ref 12–16)
HYPOCHROMIA: ABNORMAL
LYMPHOCYTES ABSOLUTE: 0.7 K/UL (ref 1–4.8)
LYMPHOCYTES RELATIVE PERCENT: 12.5 %
MCH RBC QN AUTO: 23.2 PG (ref 27–31.3)
MCHC RBC AUTO-ENTMCNC: 32.4 % (ref 33–37)
MCV RBC AUTO: 71.6 FL (ref 82–100)
MICROCYTES: ABNORMAL
MONOCYTES ABSOLUTE: 0.6 K/UL (ref 0.2–0.8)
MONOCYTES RELATIVE PERCENT: 11.8 %
NEUTROPHILS ABSOLUTE: 3.9 K/UL (ref 1.4–6.5)
NEUTROPHILS RELATIVE PERCENT: 70.7 %
PDW BLD-RTO: 28.7 % (ref 11.5–14.5)
PLATELET # BLD: 214 K/UL (ref 130–400)
RBC # BLD: 4.64 M/UL (ref 4.2–5.4)
WBC # BLD: 5.5 K/UL (ref 4.8–10.8)

## 2018-08-07 ENCOUNTER — TELEPHONE (OUTPATIENT)
Dept: INFECTIOUS DISEASES | Age: 43
End: 2018-08-07

## 2018-08-07 NOTE — TELEPHONE ENCOUNTER
REPORTED TO DR ROSALES A HIGH CRP OF 39.3. PRIOR WAS 3.4  PER DR Riddle 39- PATIENT RECENTLY WENT TO THE ORTHODONTIST. THIS MAY CAUSED THE CRP ELEVATION.   PER DR ROSALES-AT THIS TIME WE WILL MONITOR THE CRP LEVELS

## 2018-08-13 ENCOUNTER — HOSPITAL ENCOUNTER (OUTPATIENT)
Dept: NON INVASIVE DIAGNOSTICS | Age: 43
Discharge: HOME | End: 2018-08-13
Payer: COMMERCIAL

## 2018-08-13 ENCOUNTER — HOSPITAL ENCOUNTER (OUTPATIENT)
Dept: NUCLEAR MEDICINE | Age: 43
Discharge: HOME | End: 2018-08-15
Payer: COMMERCIAL

## 2018-08-13 DIAGNOSIS — I49.9 IRREGULAR HEART BEAT: ICD-10-CM

## 2018-08-13 DIAGNOSIS — R06.09 DOE (DYSPNEA ON EXERTION): ICD-10-CM

## 2018-08-13 LAB
ANISOCYTOSIS: ABNORMAL
BANDED NEUTROPHILS RELATIVE PERCENT: 1 % (ref 5–11)
BASOPHILS ABSOLUTE: 0 K/UL (ref 0–0.2)
BASOPHILS RELATIVE PERCENT: 1 %
C-REACTIVE PROTEIN: 17.9 MG/L (ref 0–5)
EOSINOPHILS ABSOLUTE: 0 K/UL (ref 0–0.7)
EOSINOPHILS RELATIVE PERCENT: 0.9 %
HCT VFR BLD CALC: 33.7 % (ref 37–47)
HEMOGLOBIN: 11 G/DL (ref 12–16)
LYMPHOCYTES ABSOLUTE: 0.4 K/UL (ref 1–4.8)
LYMPHOCYTES RELATIVE PERCENT: 9 %
MCH RBC QN AUTO: 23.6 PG (ref 27–31.3)
MCHC RBC AUTO-ENTMCNC: 32.8 % (ref 33–37)
MCV RBC AUTO: 72 FL (ref 82–100)
MICROCYTES: ABNORMAL
MONOCYTES ABSOLUTE: 0.3 K/UL (ref 0.2–0.8)
MONOCYTES RELATIVE PERCENT: 5.7 %
NEUTROPHILS ABSOLUTE: 4.2 K/UL (ref 1.4–6.5)
NEUTROPHILS RELATIVE PERCENT: 84 %
PDW BLD-RTO: 28.7 % (ref 11.5–14.5)
PLATELET # BLD: 222 K/UL (ref 130–400)
POIKILOCYTES: ABNORMAL
RBC # BLD: 4.68 M/UL (ref 4.2–5.4)
WBC # BLD: 4.9 K/UL (ref 4.8–10.8)

## 2018-08-13 PROCEDURE — A9502 TC99M TETROFOSMIN: HCPCS | Performed by: INTERNAL MEDICINE

## 2018-08-13 PROCEDURE — 6360000002 HC RX W HCPCS: Performed by: INTERNAL MEDICINE

## 2018-08-13 PROCEDURE — 78452 HT MUSCLE IMAGE SPECT MULT: CPT

## 2018-08-13 PROCEDURE — 93226 XTRNL ECG REC<48 HR SCAN A/R: CPT

## 2018-08-13 PROCEDURE — 2580000003 HC RX 258: Performed by: INTERNAL MEDICINE

## 2018-08-13 PROCEDURE — 93017 CV STRESS TEST TRACING ONLY: CPT

## 2018-08-13 PROCEDURE — 3430000000 HC RX DIAGNOSTIC RADIOPHARMACEUTICAL: Performed by: INTERNAL MEDICINE

## 2018-08-13 PROCEDURE — 93225 XTRNL ECG REC<48 HRS REC: CPT

## 2018-08-13 RX ORDER — SODIUM CHLORIDE 0.9 % (FLUSH) 0.9 %
10 SYRINGE (ML) INJECTION 2 TIMES DAILY
Status: DISCONTINUED | OUTPATIENT
Start: 2018-08-13 | End: 2018-08-16 | Stop reason: HOSPADM

## 2018-08-13 RX ADMIN — TETROFOSMIN 10.1 MILLICURIE: 0.23 INJECTION, POWDER, LYOPHILIZED, FOR SOLUTION INTRAVENOUS at 09:10

## 2018-08-13 RX ADMIN — REGADENOSON 0.4 MG: 0.08 INJECTION, SOLUTION INTRAVENOUS at 10:54

## 2018-08-13 RX ADMIN — TETROFOSMIN 30.6 MILLICURIE: 0.23 INJECTION, POWDER, LYOPHILIZED, FOR SOLUTION INTRAVENOUS at 10:54

## 2018-08-13 RX ADMIN — Medication 10 ML: at 10:55

## 2018-08-13 RX ADMIN — Medication 10 ML: at 09:10

## 2018-08-13 RX ADMIN — Medication 10 ML: at 10:54

## 2018-08-13 NOTE — PROGRESS NOTES
Hx,allergies and medications reviewed. [address] here. Injected with Lexiscan and Myoview. SOB and N/V noted. returned to baseline in recovery. HTN and Tachycardia post vomiting.     No ectopy on EKG

## 2018-08-22 ENCOUNTER — TELEPHONE (OUTPATIENT)
Dept: INFECTIOUS DISEASES | Age: 43
End: 2018-08-22

## 2018-08-22 ENCOUNTER — OFFICE VISIT (OUTPATIENT)
Dept: INFECTIOUS DISEASES | Age: 43
End: 2018-08-22
Payer: COMMERCIAL

## 2018-08-22 VITALS
DIASTOLIC BLOOD PRESSURE: 82 MMHG | SYSTOLIC BLOOD PRESSURE: 126 MMHG | WEIGHT: 177.4 LBS | HEIGHT: 63 IN | TEMPERATURE: 97.8 F | RESPIRATION RATE: 16 BRPM | BODY MASS INDEX: 31.43 KG/M2 | HEART RATE: 83 BPM

## 2018-08-22 DIAGNOSIS — L03.211 FACIAL CELLULITIS: ICD-10-CM

## 2018-08-22 DIAGNOSIS — R51.9 FACIAL PAIN, ACUTE: ICD-10-CM

## 2018-08-22 DIAGNOSIS — L02.01 ABSCESS OF CHIN: ICD-10-CM

## 2018-08-22 DIAGNOSIS — R19.7 DIARRHEA OF PRESUMED INFECTIOUS ORIGIN: Primary | ICD-10-CM

## 2018-08-22 DIAGNOSIS — K04.7 DENTAL ABSCESS: ICD-10-CM

## 2018-08-22 LAB
ANISOCYTOSIS: ABNORMAL
BANDED NEUTROPHILS RELATIVE PERCENT: 3 % (ref 5–11)
BASOPHILS ABSOLUTE: 0 K/UL (ref 0–0.2)
BASOPHILS RELATIVE PERCENT: 0.7 %
C-REACTIVE PROTEIN: 10.5 MG/L (ref 0–5)
EOSINOPHILS ABSOLUTE: 0.1 K/UL (ref 0–0.7)
EOSINOPHILS RELATIVE PERCENT: 2 %
HCT VFR BLD CALC: 37 % (ref 37–47)
HEMOGLOBIN: 11.9 G/DL (ref 12–16)
HYPOCHROMIA: 0
LYMPHOCYTES ABSOLUTE: 1.1 K/UL (ref 1–4.8)
LYMPHOCYTES RELATIVE PERCENT: 19 %
MACROCYTES: 0
MCH RBC QN AUTO: 23.6 PG (ref 27–31.3)
MCHC RBC AUTO-ENTMCNC: 32.1 % (ref 33–37)
MCV RBC AUTO: 73.6 FL (ref 82–100)
MICROCYTES: ABNORMAL
MONOCYTES ABSOLUTE: 0.2 K/UL (ref 0.2–0.8)
MONOCYTES RELATIVE PERCENT: 3.9 %
NEUTROPHILS ABSOLUTE: 4.3 K/UL (ref 1.4–6.5)
NEUTROPHILS RELATIVE PERCENT: 73 %
OVALOCYTES: ABNORMAL
PDW BLD-RTO: 27 % (ref 11.5–14.5)
PLATELET # BLD: 269 K/UL (ref 130–400)
PLATELET SLIDE REVIEW: ADEQUATE
POIKILOCYTES: ABNORMAL
POLYCHROMASIA: 0
RBC # BLD: 5.02 M/UL (ref 4.2–5.4)
SMUDGE CELLS: 3.9
WBC # BLD: 5.7 K/UL (ref 4.8–10.8)

## 2018-08-22 PROCEDURE — 1036F TOBACCO NON-USER: CPT | Performed by: INTERNAL MEDICINE

## 2018-08-22 PROCEDURE — G8417 CALC BMI ABV UP PARAM F/U: HCPCS | Performed by: INTERNAL MEDICINE

## 2018-08-22 PROCEDURE — G8427 DOCREV CUR MEDS BY ELIG CLIN: HCPCS | Performed by: INTERNAL MEDICINE

## 2018-08-22 PROCEDURE — 99214 OFFICE O/P EST MOD 30 MIN: CPT | Performed by: INTERNAL MEDICINE

## 2018-08-22 NOTE — TELEPHONE ENCOUNTER
Call to Pharmacy with Dr Neha Cali orders. Continue IV Abx for 2 more weeks. Per Patient she will not be home tomorrow, she is requesting to coordinate a delivery today/tonight. Relayed this to [facility], and they will do their best to assist with this order and delivery for today.

## 2018-08-23 NOTE — PROGRESS NOTES
Infectious Disease Progress Note    8/22/2018    Patient is a followup regarding dental abscess, cellulitis L face, chin abscess, complicated course. Currently on Zosyn and doing well. Appears to be doing ok. Complains of diarrhea. Subjectively, complains of a little pain in the L jaw. Missed her last appointment with oral surgery. .     All 14 review of systems discussed and negative other than as stated as above. General: Patient in no acute distress, cooperative  Skin: no new rashes   HEENT:  EOMI, MMM, Neck is supple  The area L lower jaw visualized. Heart: S1 S2  Lungs: clear bilaterally   Abdomen: soft, ND, +BS, NTTP  Extrem:+pulses, no calf pain  Neuro exam: CN II-XII intact    Imaging and labs were reviewed per medical records and any ID pertinent labs were addressed with the patient. Lab Results   Component Value Date    WBC 5.7 08/22/2018    HGB 11.9 (L) 08/22/2018    HCT 37.0 08/22/2018    MCV 73.6 (L) 08/22/2018     08/22/2018         Vitals:    08/22/18 1405   BP: 126/82   Pulse: 83   Resp: 16   Temp: 97.8 °F (36.6 °C)           ASSESSMENT  Dental abscess and now OM s/p I+D  Facial cellulitis - received. Chin abscess  - resolved  Diarrhea - test for C diff. PLAN:  Zosyn extended course for OM    Follow up with oral surgery.      Follow up with pain management    Time spent with patient: 25 min  >50% of time spent counseling on patient care and coordination of care      Margie Scales DO

## 2018-08-27 ENCOUNTER — OFFICE VISIT (OUTPATIENT)
Dept: CARDIOLOGY CLINIC | Age: 43
End: 2018-08-27
Payer: COMMERCIAL

## 2018-08-27 VITALS
HEART RATE: 105 BPM | OXYGEN SATURATION: 100 % | HEIGHT: 63 IN | WEIGHT: 176 LBS | BODY MASS INDEX: 31.18 KG/M2 | RESPIRATION RATE: 16 BRPM | DIASTOLIC BLOOD PRESSURE: 76 MMHG | SYSTOLIC BLOOD PRESSURE: 118 MMHG

## 2018-08-27 DIAGNOSIS — R06.09 DOE (DYSPNEA ON EXERTION): ICD-10-CM

## 2018-08-27 DIAGNOSIS — R07.9 CHEST PAIN, UNSPECIFIED TYPE: ICD-10-CM

## 2018-08-27 DIAGNOSIS — I49.9 IRREGULAR HEART BEAT: ICD-10-CM

## 2018-08-27 PROCEDURE — G8427 DOCREV CUR MEDS BY ELIG CLIN: HCPCS | Performed by: INTERNAL MEDICINE

## 2018-08-27 PROCEDURE — 1036F TOBACCO NON-USER: CPT | Performed by: INTERNAL MEDICINE

## 2018-08-27 PROCEDURE — 99214 OFFICE O/P EST MOD 30 MIN: CPT | Performed by: INTERNAL MEDICINE

## 2018-08-27 PROCEDURE — G8417 CALC BMI ABV UP PARAM F/U: HCPCS | Performed by: INTERNAL MEDICINE

## 2018-08-27 ASSESSMENT — ENCOUNTER SYMPTOMS
CHEST TIGHTNESS: 0
NAUSEA: 0
EYES NEGATIVE: 1
WHEEZING: 0
STRIDOR: 0
BLOOD IN STOOL: 0
SHORTNESS OF BREATH: 1
GASTROINTESTINAL NEGATIVE: 1
COUGH: 0

## 2018-08-27 NOTE — PROGRESS NOTES
Apply on the left mandible 1 Tube 0    lidocaine (LMX) 4 % cream Apply topically as needed. Apply to left gum / external mandible 2 Tube 0    ketorolac (TORADOL) 10 MG tablet Take 1 tablet by mouth every 6 hours as needed for Pain 20 tablet 0    ranitidine (ZANTAC) 150 MG tablet Take 150 mg by mouth daily       No current facility-administered medications for this visit. Review of Systems:   Review of Systems   Constitutional: Negative. Negative for diaphoresis and fatigue. HENT: Negative. Eyes: Negative. Respiratory: Positive for shortness of breath. Negative for cough, chest tightness, wheezing and stridor. Cardiovascular: Positive for chest pain and palpitations. Negative for leg swelling. Gastrointestinal: Negative. Negative for blood in stool and nausea. Genitourinary: Negative. Musculoskeletal: Negative. Skin: Negative. Neurological: Negative. Negative for dizziness, syncope, weakness and light-headedness. Hematological: Negative. Psychiatric/Behavioral: Negative. Physical Examination:    /76 (Site: Left Arm, Position: Sitting, Cuff Size: Large Adult)   Pulse 105   Resp 16   Ht 5' 3\" (1.6 m)   Wt 176 lb (79.8 kg)   SpO2 100%   BMI 31.18 kg/m²    Physical Exam   Constitutional: She appears healthy. No distress. HENT:   Normal cephalic and Atraumatic   Eyes: Pupils are equal, round, and reactive to light. Neck: Normal range of motion and thyroid normal. Neck supple. No JVD present. No neck adenopathy. No thyromegaly present. Cardiovascular: Normal rate, regular rhythm, intact distal pulses and normal pulses. Murmur heard. Pulmonary/Chest: Effort normal and breath sounds normal. She has no wheezes. She has no rales. She exhibits no tenderness. Abdominal: Soft. Bowel sounds are normal. There is no tenderness. Musculoskeletal: Normal range of motion. She exhibits no edema or tenderness.    Neurological: She is alert and oriented to (dyspnea on exertion)       3. Chest pain, unspecified type  Stress negative       Counseling:  Heart Healthy Lifestyle, Improve BMI, Low Salt Diet, Take Precautions to Prevent Falls, Regular Exercise and Walk Daily    Return in about 6 months (around 2/27/2019) for Cardiovascular care. .      Electronically signed by Rob Hall MD on 8/27/2018 at 3:01 PM

## 2018-08-28 LAB
ANISOCYTOSIS: ABNORMAL
BASOPHILS ABSOLUTE: 0 K/UL (ref 0–0.2)
BASOPHILS RELATIVE PERCENT: 1.3 %
C-REACTIVE PROTEIN: 16.1 MG/L (ref 0–5)
EOSINOPHILS ABSOLUTE: 0 K/UL (ref 0–0.7)
EOSINOPHILS RELATIVE PERCENT: 1.8 %
HCT VFR BLD CALC: 36.5 % (ref 37–47)
HEMOGLOBIN: 11.7 G/DL (ref 12–16)
LYMPHOCYTES ABSOLUTE: 0.5 K/UL (ref 1–4.8)
LYMPHOCYTES RELATIVE PERCENT: 23.5 %
MCH RBC QN AUTO: 24.1 PG (ref 27–31.3)
MCHC RBC AUTO-ENTMCNC: 32.2 % (ref 33–37)
MCV RBC AUTO: 74.8 FL (ref 82–100)
MICROCYTES: ABNORMAL
MONOCYTES ABSOLUTE: 0.3 K/UL (ref 0.2–0.8)
MONOCYTES RELATIVE PERCENT: 12.4 %
NEUTROPHILS ABSOLUTE: 1.3 K/UL (ref 1.4–6.5)
NEUTROPHILS RELATIVE PERCENT: 61 %
OVALOCYTES: ABNORMAL
PDW BLD-RTO: 26.1 % (ref 11.5–14.5)
PLATELET # BLD: 156 K/UL (ref 130–400)
POIKILOCYTES: ABNORMAL
RBC # BLD: 4.88 M/UL (ref 4.2–5.4)
SLIDE REVIEW: ABNORMAL
WBC # BLD: 2.1 K/UL (ref 4.8–10.8)

## 2018-09-04 ENCOUNTER — TELEPHONE (OUTPATIENT)
Dept: CARDIOLOGY CLINIC | Age: 43
End: 2018-09-04

## 2018-09-04 LAB
ANISOCYTOSIS: ABNORMAL
BASOPHILS ABSOLUTE: 0 K/UL (ref 0–0.2)
BASOPHILS RELATIVE PERCENT: 1 %
C-REACTIVE PROTEIN: 6.7 MG/L (ref 0–5)
EOSINOPHILS ABSOLUTE: 0.1 K/UL (ref 0–0.7)
EOSINOPHILS RELATIVE PERCENT: 4.1 %
HCT VFR BLD CALC: 34.4 % (ref 37–47)
HEMOGLOBIN: 11.2 G/DL (ref 12–16)
HYPOCHROMIA: ABNORMAL
LYMPHOCYTES ABSOLUTE: 1 K/UL (ref 1–4.8)
LYMPHOCYTES RELATIVE PERCENT: 29.6 %
MCH RBC QN AUTO: 24 PG (ref 27–31.3)
MCHC RBC AUTO-ENTMCNC: 32.5 % (ref 33–37)
MCV RBC AUTO: 73.7 FL (ref 82–100)
MICROCYTES: ABNORMAL
MONOCYTES ABSOLUTE: 0.3 K/UL (ref 0.2–0.8)
MONOCYTES RELATIVE PERCENT: 10.3 %
NEUTROPHILS ABSOLUTE: 1.8 K/UL (ref 1.4–6.5)
NEUTROPHILS RELATIVE PERCENT: 55 %
OVALOCYTES: ABNORMAL
PDW BLD-RTO: 25.1 % (ref 11.5–14.5)
PLATELET # BLD: 193 K/UL (ref 130–400)
RBC # BLD: 4.66 M/UL (ref 4.2–5.4)
SLIDE REVIEW: ABNORMAL
WBC # BLD: 3.3 K/UL (ref 4.8–10.8)

## 2018-09-06 ENCOUNTER — TELEPHONE (OUTPATIENT)
Dept: INFECTIOUS DISEASES | Age: 43
End: 2018-09-06

## 2018-09-06 NOTE — TELEPHONE ENCOUNTER
09/05/18 Received call from Ino Oliva [facility]) [address] States this [address] lume picc is completely occluded  Cannot flush nor infuse only one lume works out very slow. Asking to repair or replace. But as of 08/22/18 she was suppose to be on for 2 more weeks. EOT would be 09/06/18. Patient last seen 08/22/18. Next appointment 09/14/18.     I will consult Dr. Reanna Monaco

## 2018-09-06 NOTE — TELEPHONE ENCOUNTER
Per Dr. Argentina Ramos if EOT is 09/06/18 leave it out. Called Shawna Granados but the office is close . Ans. Service transfer my call to Calvin Akbar (RN on Call)  Advise as per Dr. Argentina Ramos If EOT 09/06/18 Then D/C Abx, D/C Picc. Then Alton Carlisle called From [facility] about patient EOT today  Relayed Dr. Argentina Ramos order. She Verbalized Understanding.

## 2018-09-14 ENCOUNTER — OFFICE VISIT (OUTPATIENT)
Dept: INFECTIOUS DISEASES | Age: 43
End: 2018-09-14
Payer: COMMERCIAL

## 2018-09-14 VITALS
SYSTOLIC BLOOD PRESSURE: 118 MMHG | HEIGHT: 63 IN | RESPIRATION RATE: 18 BRPM | TEMPERATURE: 99 F | BODY MASS INDEX: 32 KG/M2 | HEART RATE: 81 BPM | WEIGHT: 180.6 LBS | DIASTOLIC BLOOD PRESSURE: 86 MMHG

## 2018-09-14 DIAGNOSIS — M27.2 OSTEOMYELITIS, JAW ACUTE: ICD-10-CM

## 2018-09-14 DIAGNOSIS — Z91.199 HISTORY OF NONCOMPLIANCE WITH MEDICAL TREATMENT: ICD-10-CM

## 2018-09-14 DIAGNOSIS — L02.01 ABSCESS OF CHIN: Primary | ICD-10-CM

## 2018-09-14 DIAGNOSIS — K04.7 DENTAL ABSCESS: ICD-10-CM

## 2018-09-14 PROCEDURE — 1036F TOBACCO NON-USER: CPT | Performed by: INTERNAL MEDICINE

## 2018-09-14 PROCEDURE — G8417 CALC BMI ABV UP PARAM F/U: HCPCS | Performed by: INTERNAL MEDICINE

## 2018-09-14 PROCEDURE — G8427 DOCREV CUR MEDS BY ELIG CLIN: HCPCS | Performed by: INTERNAL MEDICINE

## 2018-09-14 PROCEDURE — 99214 OFFICE O/P EST MOD 30 MIN: CPT | Performed by: INTERNAL MEDICINE

## 2018-09-14 RX ORDER — AMOXICILLIN 250 MG/1
500 CAPSULE ORAL 3 TIMES DAILY
Qty: 60 CAPSULE | Refills: 0 | Status: SHIPPED | OUTPATIENT
Start: 2018-09-14 | End: 2018-09-24

## 2019-02-04 ENCOUNTER — HOSPITAL ENCOUNTER (EMERGENCY)
Age: 44
Discharge: HOME | End: 2019-02-04
Attending: EMERGENCY MEDICINE
Payer: COMMERCIAL

## 2019-02-04 ENCOUNTER — APPOINTMENT (OUTPATIENT)
Dept: GENERAL RADIOLOGY | Age: 44
End: 2019-02-04
Payer: COMMERCIAL

## 2019-02-04 VITALS
WEIGHT: 190 LBS | RESPIRATION RATE: 16 BRPM | SYSTOLIC BLOOD PRESSURE: 119 MMHG | OXYGEN SATURATION: 97 % | TEMPERATURE: 97.9 F | HEART RATE: 92 BPM | HEIGHT: 63 IN | DIASTOLIC BLOOD PRESSURE: 87 MMHG | BODY MASS INDEX: 33.66 KG/M2
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
## 2019-02-04 DIAGNOSIS — S46.912A SHOULDER STRAIN, LEFT, INITIAL ENCOUNTER: ICD-10-CM

## 2019-02-04 DIAGNOSIS — S16.1XXA STRAIN OF NECK MUSCLE, INITIAL ENCOUNTER: ICD-10-CM

## 2019-02-04 PROCEDURE — 72050 X-RAY EXAM NECK SPINE 4/5VWS: CPT

## 2019-02-04 PROCEDURE — 73030 X-RAY EXAM OF SHOULDER: CPT

## 2019-02-04 PROCEDURE — 6370000000 HC RX 637 (ALT 250 FOR IP): Performed by: NURSE PRACTITIONER

## 2019-02-04 PROCEDURE — 99283 EMERGENCY DEPT VISIT LOW MDM: CPT

## 2019-02-04 RX ORDER — NAPROXEN 500 MG/1
500 TABLET ORAL ONCE
Status: COMPLETED | OUTPATIENT
Start: 2019-02-04 | End: 2019-02-04

## 2019-02-04 RX ORDER — NAPROXEN 500 MG/1
500 TABLET ORAL 2 TIMES DAILY
Qty: 20 TABLET | Refills: 0 | Status: SHIPPED | OUTPATIENT
Start: 2019-02-04 | End: 2019-02-28

## 2019-02-04 RX ORDER — CYCLOBENZAPRINE HCL 10 MG
10 TABLET ORAL 3 TIMES DAILY PRN
Qty: 15 TABLET | Refills: 0 | Status: SHIPPED | OUTPATIENT
Start: 2019-02-04 | End: 2019-02-14

## 2019-02-04 RX ADMIN — NAPROXEN 500 MG: 500 TABLET ORAL at 11:07

## 2019-02-04 ASSESSMENT — PAIN SCALES - GENERAL: PAINLEVEL_OUTOF10: 8

## 2019-02-04 ASSESSMENT — PAIN DESCRIPTION - LOCATION: LOCATION: GENERALIZED

## 2019-02-04 ASSESSMENT — ENCOUNTER SYMPTOMS
BACK PAIN: 0
SHORTNESS OF BREATH: 0
ABDOMINAL PAIN: 0
COUGH: 0

## 2019-02-04 ASSESSMENT — PAIN DESCRIPTION - ONSET: ONSET: PROGRESSIVE

## 2019-02-04 ASSESSMENT — PAIN DESCRIPTION - FREQUENCY: FREQUENCY: CONTINUOUS

## 2019-02-04 ASSESSMENT — PAIN DESCRIPTION - DESCRIPTORS: DESCRIPTORS: ACHING

## 2019-02-04 ASSESSMENT — PAIN DESCRIPTION - PAIN TYPE: TYPE: ACUTE PAIN

## 2019-02-28 ENCOUNTER — OFFICE VISIT (OUTPATIENT)
Dept: CARDIOLOGY CLINIC | Age: 44
End: 2019-02-28
Payer: COMMERCIAL

## 2019-02-28 VITALS
DIASTOLIC BLOOD PRESSURE: 82 MMHG | RESPIRATION RATE: 22 BRPM | HEART RATE: 106 BPM | OXYGEN SATURATION: 98 % | WEIGHT: 189.4 LBS | BODY MASS INDEX: 33.56 KG/M2 | SYSTOLIC BLOOD PRESSURE: 124 MMHG | HEIGHT: 63 IN

## 2019-02-28 DIAGNOSIS — R06.09 DOE (DYSPNEA ON EXERTION): Primary | ICD-10-CM

## 2019-02-28 DIAGNOSIS — R07.9 CHEST PAIN, UNSPECIFIED TYPE: ICD-10-CM

## 2019-02-28 PROCEDURE — 99214 OFFICE O/P EST MOD 30 MIN: CPT | Performed by: INTERNAL MEDICINE

## 2019-02-28 PROCEDURE — G8417 CALC BMI ABV UP PARAM F/U: HCPCS | Performed by: INTERNAL MEDICINE

## 2019-02-28 PROCEDURE — G8484 FLU IMMUNIZE NO ADMIN: HCPCS | Performed by: INTERNAL MEDICINE

## 2019-02-28 PROCEDURE — 1036F TOBACCO NON-USER: CPT | Performed by: INTERNAL MEDICINE

## 2019-02-28 PROCEDURE — G8427 DOCREV CUR MEDS BY ELIG CLIN: HCPCS | Performed by: INTERNAL MEDICINE

## 2019-02-28 ASSESSMENT — ENCOUNTER SYMPTOMS
WHEEZING: 0
COUGH: 0
BLOOD IN STOOL: 0
RESPIRATORY NEGATIVE: 1
STRIDOR: 0
NAUSEA: 0
SHORTNESS OF BREATH: 0
CHEST TIGHTNESS: 0
EYES NEGATIVE: 1
GASTROINTESTINAL NEGATIVE: 1

## 2020-07-13 ENCOUNTER — HOSPITAL ENCOUNTER (EMERGENCY)
Age: 45
Discharge: HOME | End: 2020-07-14
Payer: COMMERCIAL

## 2020-07-13 LAB
ABO/RH: NORMAL
ALBUMIN SERPL-MCNC: 4 G/DL (ref 3.5–4.6)
ALP BLD-CCNC: 71 U/L (ref 40–130)
ALT SERPL-CCNC: 20 U/L (ref 0–33)
ANION GAP SERPL CALCULATED.3IONS-SCNC: 10 MEQ/L (ref 9–15)
ANISOCYTOSIS: ABNORMAL
ANTIBODY SCREEN: NORMAL
AST SERPL-CCNC: 20 U/L (ref 0–35)
BASOPHILS ABSOLUTE: 0.1 K/UL (ref 0–0.2)
BASOPHILS RELATIVE PERCENT: 0.7 %
BILIRUB SERPL-MCNC: 0.4 MG/DL (ref 0.2–0.7)
BUN BLDV-MCNC: 12 MG/DL (ref 6–20)
CALCIUM SERPL-MCNC: 9.3 MG/DL (ref 8.5–9.9)
CHLORIDE BLD-SCNC: 103 MEQ/L (ref 95–107)
CO2: 24 MEQ/L (ref 20–31)
CREAT SERPL-MCNC: 0.79 MG/DL (ref 0.5–0.9)
EKG ATRIAL RATE: 79 BPM
EKG P AXIS: 28 DEGREES
EKG P-R INTERVAL: 130 MS
EKG Q-T INTERVAL: 368 MS
EKG QRS DURATION: 88 MS
EKG QTC CALCULATION (BAZETT): 421 MS
EKG R AXIS: -47 DEGREES
EKG T AXIS: 35 DEGREES
EKG VENTRICULAR RATE: 79 BPM
EOSINOPHILS ABSOLUTE: 0.1 K/UL (ref 0–0.7)
EOSINOPHILS RELATIVE PERCENT: 1.3 %
GFR AFRICAN AMERICAN: >60
GFR NON-AFRICAN AMERICAN: >60
GLOBULIN: 3.2 G/DL (ref 2.3–3.5)
GLUCOSE BLD-MCNC: 96 MG/DL (ref 70–99)
HCT VFR BLD CALC: 24.3 % (ref 37–47)
HEMOGLOBIN: 7.1 G/DL (ref 12–16)
HYPOCHROMIA: ABNORMAL
LYMPHOCYTES ABSOLUTE: 1.4 K/UL (ref 1–4.8)
LYMPHOCYTES RELATIVE PERCENT: 18 %
MCH RBC QN AUTO: 15.9 PG (ref 27–31.3)
MCHC RBC AUTO-ENTMCNC: 29.4 % (ref 33–37)
MCV RBC AUTO: 54.3 FL (ref 82–100)
MICROCYTES: ABNORMAL
MONOCYTES ABSOLUTE: 0.6 K/UL (ref 0.2–0.8)
MONOCYTES RELATIVE PERCENT: 8.1 %
NEUTROPHILS ABSOLUTE: 5.5 K/UL (ref 1.4–6.5)
NEUTROPHILS RELATIVE PERCENT: 71.9 %
OVALOCYTES: ABNORMAL
PDW BLD-RTO: 18.7 % (ref 11.5–14.5)
PLATELET # BLD: 279 K/UL (ref 130–400)
PLATELET SLIDE REVIEW: NORMAL
POIKILOCYTES: ABNORMAL
POLYCHROMASIA: ABNORMAL
POTASSIUM SERPL-SCNC: 4.2 MEQ/L (ref 3.4–4.9)
RBC # BLD: 4.47 M/UL (ref 4.2–5.4)
SLIDE REVIEW: ABNORMAL
SODIUM BLD-SCNC: 137 MEQ/L (ref 135–144)
TOTAL PROTEIN: 7.2 G/DL (ref 6.3–8)
WBC # BLD: 7.7 K/UL (ref 4.8–10.8)

## 2020-07-13 PROCEDURE — 36415 COLL VENOUS BLD VENIPUNCTURE: CPT

## 2020-07-13 PROCEDURE — 2580000003 HC RX 258: Performed by: PERSONAL EMERGENCY RESPONSE ATTENDANT

## 2020-07-13 PROCEDURE — 86900 BLOOD TYPING SEROLOGIC ABO: CPT

## 2020-07-13 PROCEDURE — 36430 TRANSFUSION BLD/BLD COMPNT: CPT

## 2020-07-13 PROCEDURE — 99284 EMERGENCY DEPT VISIT MOD MDM: CPT

## 2020-07-13 PROCEDURE — P9016 RBC LEUKOCYTES REDUCED: HCPCS

## 2020-07-13 PROCEDURE — 93005 ELECTROCARDIOGRAM TRACING: CPT | Performed by: PERSONAL EMERGENCY RESPONSE ATTENDANT

## 2020-07-13 PROCEDURE — 86850 RBC ANTIBODY SCREEN: CPT

## 2020-07-13 PROCEDURE — 86923 COMPATIBILITY TEST ELECTRIC: CPT

## 2020-07-13 PROCEDURE — 85025 COMPLETE CBC W/AUTO DIFF WBC: CPT

## 2020-07-13 PROCEDURE — 80053 COMPREHEN METABOLIC PANEL: CPT

## 2020-07-13 PROCEDURE — 86901 BLOOD TYPING SEROLOGIC RH(D): CPT

## 2020-07-13 RX ORDER — FERROUS SULFATE 325(65) MG
325 TABLET ORAL 2 TIMES DAILY
Qty: 60 TABLET | Refills: 0 | Status: SHIPPED | OUTPATIENT
Start: 2020-07-13 | End: 2022-04-14

## 2020-07-13 RX ORDER — DOCUSATE SODIUM 100 MG/1
100 CAPSULE, LIQUID FILLED ORAL 2 TIMES DAILY
Qty: 30 CAPSULE | Refills: 0 | Status: SHIPPED | OUTPATIENT
Start: 2020-07-13 | End: 2022-04-14

## 2020-07-13 RX ORDER — 0.9 % SODIUM CHLORIDE 0.9 %
20 INTRAVENOUS SOLUTION INTRAVENOUS ONCE
Status: COMPLETED | OUTPATIENT
Start: 2020-07-13 | End: 2020-07-14

## 2020-07-13 RX ADMIN — SODIUM CHLORIDE 20 ML: 9 INJECTION, SOLUTION INTRAVENOUS at 22:15

## 2020-07-13 ASSESSMENT — ENCOUNTER SYMPTOMS
COLOR CHANGE: 0
DIARRHEA: 0
COUGH: 0
VOMITING: 0
BLOOD IN STOOL: 0
SORE THROAT: 0
SHORTNESS OF BREATH: 1
ABDOMINAL PAIN: 0
RHINORRHEA: 0
NAUSEA: 0

## 2020-07-13 NOTE — ED NOTES
Pt presents to the ER with complaints of [facility] stating her HGB is 7.0  Patient states that she has been sob and tired recently but assumed it was because she was gaining weight  Past history of anemia with blood transfusions  100% on RA  Patient denies blood loss      Real Mane RN  07/13/20 1925

## 2020-07-13 NOTE — ED PROVIDER NOTES
[facility] ED  eMERGENCY dEPARTMENT eNCOUnter      Pt Name: Carolynn Singleton  MRN: [id number]  [place] 1975  Date of evaluation: 7/13/2020  Provider: Candido Smith Yanna Tipton is a 39 y.o. female with PMHx of anemia presents to the emergency department with hemoglobin 7. Patient attributes that she had routine lab work done by her doctor. She was called to come to the ER to get a blood transfusion due to hemoglobin of 7 she states she has been Tired for months and short of breath with exertion. She denies fevers, lightheaded dizziness, cough, chest pain, abdominal pain, nausea, vomiting, diarrhea, bleeding sites, heavy menstrual cycles, black tarry stools or blood in her stools. She has a history of getting blood transfusions, last one a couple years ago. She denies being on iron pills in the past.     HPI    Nursing Notes were reviewed. REVIEW OF SYSTEMS       Review of Systems   Constitutional: Positive for fatigue. Negative for appetite change, chills and fever. HENT: Negative for congestion, rhinorrhea and sore throat. Respiratory: Positive for shortness of breath. Negative for cough. Cardiovascular: Negative for chest pain. Gastrointestinal: Negative for abdominal pain, blood in stool, diarrhea, nausea and vomiting. Genitourinary: Negative for difficulty urinating. Musculoskeletal: Negative for neck stiffness. Skin: Negative for color change and rash. Neurological: Negative for dizziness, syncope, weakness, light-headedness, numbness and headaches. All other systems reviewed and are negative. PAST MEDICAL HISTORY   History reviewed. No pertinent past medical history.       SURGICAL HISTORY       Past Surgical History:   Procedure Laterality Date    [place] DENTAL SURGERY PROCEDURE Left 7/12/2018    REMOVAL OF TOOTH #18 WITH IRRIGARION OF ABSCESS performed by Maninder Olson DDS at [address] [place] [address] SURGERY PROCEDURE N/A 7/15/2018    DENTAL EXTRACTION performed by More Nunez DDS at [address] [place]       Previous Medications    ACETAMINOPHEN (AMINOFEN) 325 MG TABLET    Take 2 tablets by mouth every 6 hours as needed for Pain    RANITIDINE (ZANTAC) 150 MG TABLET    Take 150 mg by mouth daily       ALLERGIES     Patient has no known allergies. FAMILY HISTORY       Family History   Problem Relation Age of Onset    No Known Problems Father           SOCIAL HISTORY       Social History     Socioeconomic History    Marital status: Single     Spouse name: None    Number of children: None    Years of education: None    Highest education level: None   Occupational History    None   Social Needs    Financial resource strain: None    Food insecurity     Worry: None     Inability: None    Transportation needs     Medical: None     Non-medical: None   Tobacco Use    Smoking status: Never Smoker    Smokeless tobacco: Never Used   Substance and Sexual Activity    Alcohol use: No    Drug use: No    Sexual activity: None   Lifestyle    Physical activity     Days per week: None     Minutes per session: None    Stress: None   Relationships    Social connections     Talks on phone: None     Gets together: None     Attends Yazdanism service: None     Active member of club or organization: None     Attends meetings of clubs or organizations: None     Relationship status: None    Intimate partner violence     Fear of current or ex partner: None     Emotionally abused: None     Physically abused: None     Forced sexual activity: None   Other Topics Concern    None   Social History Narrative    None         PHYSICAL EXAM         ED Triage Vitals [07/13/20 1850]   BP Temp Temp Source Pulse Resp SpO2 Height Weight   132/76 98.6 °F (37 °C) Oral 88 18 100 % 5' 3\" (1.6 m) 222 lb (100.7 kg)       Physical Exam  Constitutional:       Appearance: She is well-developed.    HENT:      Head: Normocephalic and atraumatic. Eyes:      Conjunctiva/sclera: Conjunctivae normal.      Pupils: Pupils are equal, round, and reactive to light. Neck:      Musculoskeletal: Normal range of motion and neck supple. Trachea: No tracheal deviation. Cardiovascular:      Heart sounds: Normal heart sounds. Pulmonary:      Effort: Pulmonary effort is normal. No respiratory distress. Breath sounds: Normal breath sounds. No stridor. Abdominal:      General: Bowel sounds are normal. There is no distension. Palpations: Abdomen is soft. There is no mass. Tenderness: There is no abdominal tenderness. There is no guarding or rebound. Musculoskeletal: Normal range of motion. Skin:     General: Skin is warm and dry. Capillary Refill: Capillary refill takes less than 2 seconds. Findings: No rash. Neurological:      Mental Status: She is alert and oriented to person, place, and time. Deep Tendon Reflexes: Reflexes are normal and symmetric. Psychiatric:         Behavior: Behavior normal.         Thought Content:  Thought content normal.         Judgment: Judgment normal.         DIAGNOSTIC RESULTS     EKG:All EKG's are interpreted by the Emergency Department Physician who either signs or Co-signs this chart in the absence of a cardiologist.    EKG is normal sinus rhythm, rate 79, normal intervals, no ST segment changes    RADIOLOGY:   Non-plain film images such as CT, Ultrasound and MRI are read by theradiologist. Plain radiographic images are visualized and preliminarily interpreted by the emergency physician with the below findings:    Interpretation per theRadiologist below, if available at the time of this note:    No orders to display           LABS:  Labs Reviewed   CBC WITH AUTO DIFFERENTIAL - Abnormal; Notable for the following components:       Result Value    Hemoglobin 7.1 (*)     Hematocrit 24.3 (*)     MCV 54.3 (*)     MCH 15.9 (*)     MCHC 29.4 (*)     RDW 18.7 (*)     All other components within normal limits   COMPREHENSIVE METABOLIC PANEL   TYPE AND SCREEN   PREPARE RBC (CROSSMATCH)       All other labs were within normal range or not returned as of this dictation. EMERGENCY DEPARTMENT COURSE and DIFFERENTIAL DIAGNOSIS/MDM:   Vitals:    Vitals:    07/13/20 1850   BP: 132/76   Pulse: 88   Resp: 18   Temp: 98.6 °F (37 °C)   TempSrc: Oral   SpO2: 100%   Weight: 222 lb (100.7 kg)   Height: 5' 3\" (1.6 m)         MDM    Hemoglobin 7.1, Hmct 24.3, MCV 54.3, MCH 15.9, MCHC 29.4, RDW 18.7. Patient was given 1 unit of blood. Should be sent home with iron tabs and Colace. She is aware iron can cause constipation and black stools. She does have a follow-up appointment with her family doctor in 1 week. Standard anticipatory guidance given to patient upon discharge. Have given them a specific time frame in which to follow-up and who to follow-up with. I have also advised them that they should return to the emergency department if they get worse, or not getting better or develop any new or concerning symptoms. Patient demonstrates understanding. CRITICAL CARE TIME   Total Critical Caretime was 0 minutes, excluding separately reportable procedures. There was a high probability of clinically significant/life threatening deterioration in the patient's condition which required my urgent intervention. Procedures    FINAL IMPRESSION      1.  Iron deficiency anemia, unspecified iron deficiency anemia type          DISPOSITION/PLAN   DISPOSITION Discharge - Pending Orders Complete 07/13/2020 08:15:48 PM      PATIENT REFERRED TO:  Ellis Tracy MD  [facility]  [phone]    In 1 week        DISCHARGE MEDICATIONS:  New Prescriptions    FERROUS SULFATE (IRON 325) 325 (65 FE) MG TABLET    Take 1 tablet by mouth 2 times daily          (Please notethat portions of this note were completed with a voice recognition program.  Efforts were made to edit the dictations but occasionally words are mis-transcribed. )    KIMMY Hung (electronically signed)  Emergency Physician Assistant         Alfonso Alcarazma  07/13/20 2021

## 2020-07-14 VITALS
BODY MASS INDEX: 39.34 KG/M2 | TEMPERATURE: 97.7 F | OXYGEN SATURATION: 97 % | SYSTOLIC BLOOD PRESSURE: 104 MMHG | DIASTOLIC BLOOD PRESSURE: 69 MMHG | HEIGHT: 63 IN | WEIGHT: 222 LBS | HEART RATE: 75 BPM | RESPIRATION RATE: 18 BRPM

## 2020-07-14 LAB
BLOOD BANK DISPENSE STATUS: NORMAL
BLOOD BANK DISPENSE STATUS: NORMAL
BLOOD BANK PRODUCT CODE: NORMAL
BLOOD BANK PRODUCT CODE: NORMAL
BPU ID: NORMAL
BPU ID: NORMAL
DESCRIPTION BLOOD BANK: NORMAL
DESCRIPTION BLOOD BANK: NORMAL

## 2020-07-16 PROCEDURE — 93010 ELECTROCARDIOGRAM REPORT: CPT | Performed by: INTERNAL MEDICINE

## 2020-11-15 ENCOUNTER — APPOINTMENT (OUTPATIENT)
Dept: CT IMAGING | Age: 45
End: 2020-11-15
Payer: COMMERCIAL

## 2020-11-15 ENCOUNTER — HOSPITAL ENCOUNTER (EMERGENCY)
Age: 45
Discharge: HOME | End: 2020-11-15
Attending: EMERGENCY MEDICINE
Payer: COMMERCIAL

## 2020-11-15 VITALS
HEIGHT: 63 IN | TEMPERATURE: 97.7 F | OXYGEN SATURATION: 98 % | HEART RATE: 91 BPM | BODY MASS INDEX: 38.98 KG/M2 | DIASTOLIC BLOOD PRESSURE: 81 MMHG | SYSTOLIC BLOOD PRESSURE: 131 MMHG | RESPIRATION RATE: 18 BRPM | WEIGHT: 220 LBS

## 2020-11-15 LAB
ALBUMIN SERPL-MCNC: 4 G/DL (ref 3.5–4.6)
ALP BLD-CCNC: 88 U/L (ref 40–130)
ALT SERPL-CCNC: 99 U/L (ref 0–33)
ANION GAP SERPL CALCULATED.3IONS-SCNC: 14 MEQ/L (ref 9–15)
ANISOCYTOSIS: ABNORMAL
AST SERPL-CCNC: 66 U/L (ref 0–35)
BASOPHILS ABSOLUTE: 0.1 K/UL (ref 0–0.2)
BASOPHILS RELATIVE PERCENT: 0.7 %
BILIRUB SERPL-MCNC: 0.4 MG/DL (ref 0.2–0.7)
BUN BLDV-MCNC: 8 MG/DL (ref 6–20)
CALCIUM SERPL-MCNC: 8.9 MG/DL (ref 8.5–9.9)
CHLORIDE BLD-SCNC: 103 MEQ/L (ref 95–107)
CO2: 24 MEQ/L (ref 20–31)
CREAT SERPL-MCNC: 0.79 MG/DL (ref 0.5–0.9)
EOSINOPHILS ABSOLUTE: 0.2 K/UL (ref 0–0.7)
EOSINOPHILS RELATIVE PERCENT: 2.2 %
GFR AFRICAN AMERICAN: >60
GFR NON-AFRICAN AMERICAN: >60
GLOBULIN: 3.6 G/DL (ref 2.3–3.5)
GLUCOSE BLD-MCNC: 105 MG/DL (ref 70–99)
HCT VFR BLD CALC: 39.3 % (ref 37–47)
HEMOGLOBIN: 12.9 G/DL (ref 12–16)
HYPOCHROMIA: ABNORMAL
LYMPHOCYTES ABSOLUTE: 1.2 K/UL (ref 1–4.8)
LYMPHOCYTES RELATIVE PERCENT: 14.6 %
MCH RBC QN AUTO: 24.3 PG (ref 27–31.3)
MCHC RBC AUTO-ENTMCNC: 32.9 % (ref 33–37)
MCV RBC AUTO: 73.8 FL (ref 82–100)
MICROCYTES: ABNORMAL
MONOCYTES ABSOLUTE: 0.6 K/UL (ref 0.2–0.8)
MONOCYTES RELATIVE PERCENT: 6.6 %
NEUTROPHILS ABSOLUTE: 6.4 K/UL (ref 1.4–6.5)
NEUTROPHILS RELATIVE PERCENT: 75.9 %
OVALOCYTES: ABNORMAL
PDW BLD-RTO: 33.1 % (ref 11.5–14.5)
PLATELET # BLD: 235 K/UL (ref 130–400)
POTASSIUM SERPL-SCNC: 3.7 MEQ/L (ref 3.4–4.9)
RBC # BLD: 5.32 M/UL (ref 4.2–5.4)
SLIDE REVIEW: ABNORMAL
SODIUM BLD-SCNC: 141 MEQ/L (ref 135–144)
TOTAL PROTEIN: 7.6 G/DL (ref 6.3–8)
WBC # BLD: 8.5 K/UL (ref 4.8–10.8)

## 2020-11-15 PROCEDURE — 6370000000 HC RX 637 (ALT 250 FOR IP): Performed by: EMERGENCY MEDICINE

## 2020-11-15 PROCEDURE — 6360000002 HC RX W HCPCS: Performed by: EMERGENCY MEDICINE

## 2020-11-15 PROCEDURE — 85025 COMPLETE CBC W/AUTO DIFF WBC: CPT

## 2020-11-15 PROCEDURE — 36415 COLL VENOUS BLD VENIPUNCTURE: CPT

## 2020-11-15 PROCEDURE — 96375 TX/PRO/DX INJ NEW DRUG ADDON: CPT

## 2020-11-15 PROCEDURE — 99283 EMERGENCY DEPT VISIT LOW MDM: CPT

## 2020-11-15 PROCEDURE — 96374 THER/PROPH/DIAG INJ IV PUSH: CPT

## 2020-11-15 PROCEDURE — 2580000003 HC RX 258: Performed by: EMERGENCY MEDICINE

## 2020-11-15 PROCEDURE — 70450 CT HEAD/BRAIN W/O DYE: CPT

## 2020-11-15 PROCEDURE — 80053 COMPREHEN METABOLIC PANEL: CPT

## 2020-11-15 RX ORDER — IBUPROFEN 800 MG/1
800 TABLET ORAL 2 TIMES DAILY PRN
Qty: 180 TABLET | Refills: 1 | Status: SHIPPED | OUTPATIENT
Start: 2020-11-15 | End: 2022-04-14

## 2020-11-15 RX ORDER — KETOROLAC TROMETHAMINE 30 MG/ML
30 INJECTION, SOLUTION INTRAMUSCULAR; INTRAVENOUS ONCE
Status: COMPLETED | OUTPATIENT
Start: 2020-11-15 | End: 2020-11-15

## 2020-11-15 RX ORDER — METOCLOPRAMIDE HYDROCHLORIDE 5 MG/ML
10 INJECTION INTRAMUSCULAR; INTRAVENOUS ONCE
Status: COMPLETED | OUTPATIENT
Start: 2020-11-15 | End: 2020-11-15

## 2020-11-15 RX ORDER — DIPHENHYDRAMINE HYDROCHLORIDE 50 MG/ML
25 INJECTION INTRAMUSCULAR; INTRAVENOUS ONCE
Status: COMPLETED | OUTPATIENT
Start: 2020-11-15 | End: 2020-11-15

## 2020-11-15 RX ORDER — ACETAMINOPHEN 500 MG
1000 TABLET ORAL ONCE
Status: COMPLETED | OUTPATIENT
Start: 2020-11-15 | End: 2020-11-15

## 2020-11-15 RX ORDER — METOCLOPRAMIDE 10 MG/1
10 TABLET ORAL 2 TIMES DAILY PRN
Qty: 60 TABLET | Refills: 0 | Status: SHIPPED | OUTPATIENT
Start: 2020-11-15 | End: 2022-04-14

## 2020-11-15 RX ORDER — 0.9 % SODIUM CHLORIDE 0.9 %
1000 INTRAVENOUS SOLUTION INTRAVENOUS ONCE
Status: COMPLETED | OUTPATIENT
Start: 2020-11-15 | End: 2020-11-15

## 2020-11-15 RX ADMIN — DIPHENHYDRAMINE HYDROCHLORIDE 25 MG: 50 INJECTION, SOLUTION INTRAMUSCULAR; INTRAVENOUS at 14:22

## 2020-11-15 RX ADMIN — ACETAMINOPHEN 1000 MG: 500 TABLET ORAL at 14:23

## 2020-11-15 RX ADMIN — KETOROLAC TROMETHAMINE 30 MG: 30 INJECTION, SOLUTION INTRAMUSCULAR at 14:23

## 2020-11-15 RX ADMIN — METOCLOPRAMIDE HYDROCHLORIDE 10 MG: 5 INJECTION INTRAMUSCULAR; INTRAVENOUS at 14:22

## 2020-11-15 RX ADMIN — SODIUM CHLORIDE 1000 ML: 9 INJECTION, SOLUTION INTRAVENOUS at 14:22

## 2020-11-15 ASSESSMENT — ENCOUNTER SYMPTOMS
SHORTNESS OF BREATH: 0
VOMITING: 1
BACK PAIN: 0
COUGH: 0
SORE THROAT: 0
NAUSEA: 1
ABDOMINAL PAIN: 0
DIARRHEA: 0

## 2020-11-15 ASSESSMENT — PAIN SCALES - GENERAL: PAINLEVEL_OUTOF10: 10

## 2020-11-15 ASSESSMENT — PAIN DESCRIPTION - DESCRIPTORS: DESCRIPTORS: NUMBNESS

## 2020-11-15 NOTE — ED PROVIDER NOTES
[facility] ED  eMERGENCYdEPARTMENT eNCOUnter      Pt Name: Gabo Cabello  MRN: [id number]  [place] 1975  Date of evaluation: 11/15/2020  Atiya Jenkins MD    CHIEF COMPLAINT           HPI  Gabo Cabello is a 39 y.o. female per chart review has no pmh presents to the ED with headache, R arm numbness. Pt notes gradual onset, moderate, constant, diffuse throbbing headache since this am.  +N/v.  Pt also notes gradual onset, mild, constant, R arm numbness since 8 am.  Pt denies fever, cp, sob, dysuria, diarrhea. ROS  Review of Systems   Constitutional: Negative for activity change, chills and fever. HENT: Negative for ear pain and sore throat. Eyes: Negative for visual disturbance. Respiratory: Negative for cough and shortness of breath. Cardiovascular: Negative for chest pain, palpitations and leg swelling. Gastrointestinal: Positive for nausea and vomiting. Negative for abdominal pain and diarrhea. Genitourinary: Negative for dysuria. Musculoskeletal: Negative for back pain. Skin: Negative for rash. Neurological: Positive for headaches. Negative for dizziness and weakness. R arm numbness       Except as noted above the remainder of the review of systems was reviewed and negative. PAST MEDICAL HISTORY   History reviewed. No pertinent past medical history.       SURGICAL HISTORY       Past Surgical History:   Procedure Laterality Date    [place] DENTAL SURGERY PROCEDURE Left 7/12/2018    REMOVAL OF TOOTH #18 WITH IRRIGARION OF ABSCESS performed by Prakash Cooney DDS at [address] [place] [address] N/A 7/15/2018    DENTAL EXTRACTION performed by Prakash Cooney DDS at [address] [place] [address]       Previous Medications    ACETAMINOPHEN (AMINOFEN) 325 MG TABLET    Take 2 tablets by mouth every 6 hours as needed for Pain    DOCUSATE SODIUM (COLACE) 100 MG CAPSULE    Take 1 capsule by mouth 2 times daily    FERROUS SULFATE (IRON 325) 325 (65 FE) MG TABLET    Take 1 tablet by mouth 2 times daily    RANITIDINE (ZANTAC) 150 MG TABLET    Take 150 mg by mouth daily       ALLERGIES     Patient has no known allergies. FAMILY HISTORY       Family History   Problem Relation Age of Onset    No Known Problems Father           SOCIAL HISTORY       Social History     Socioeconomic History    Marital status: Single     Spouse name: None    Number of children: None    Years of education: None    Highest education level: None   Occupational History    None   Social Needs    Financial resource strain: None    Food insecurity     Worry: None     Inability: None    Transportation needs     Medical: None     Non-medical: None   Tobacco Use    Smoking status: Never Smoker    Smokeless tobacco: Never Used   Substance and Sexual Activity    Alcohol use: No    Drug use: No    Sexual activity: Not Currently   Lifestyle    Physical activity     Days per week: None     Minutes per session: None    Stress: None   Relationships    Social connections     Talks on phone: None     Gets together: None     Attends Yazidi service: None     Active member of club or organization: None     Attends meetings of clubs or organizations: None     Relationship status: None    Intimate partner violence     Fear of current or ex partner: None     Emotionally abused: None     Physically abused: None     Forced sexual activity: None   Other Topics Concern    None   Social History Narrative    None         PHYSICAL EXAM       ED Triage Vitals [11/15/20 1353]   BP Temp Temp Source Pulse Resp SpO2 Height Weight   131/81 97.7 °F (36.5 °C) Oral 91 18 98 % 5' 3\" (1.6 m) 220 lb (99.8 kg)       Physical Exam  Vitals signs and nursing note reviewed. Constitutional:       Appearance: She is well-developed. HENT:      Head: Normocephalic.       Right Ear: External ear normal.      Left Ear: External ear normal.   Eyes:      Conjunctiva/sclera: Conjunctivae normal. Pupils: Pupils are equal, round, and reactive to light. Neck:      Musculoskeletal: Normal range of motion and neck supple. Comments: No nuchal rigidty  Cardiovascular:      Rate and Rhythm: Normal rate and regular rhythm. Heart sounds: Normal heart sounds. Pulmonary:      Effort: Pulmonary effort is normal.      Breath sounds: Normal breath sounds. Abdominal:      General: Bowel sounds are normal. There is no distension. Palpations: Abdomen is soft. Tenderness: There is no abdominal tenderness. Musculoskeletal: Normal range of motion. Skin:     General: Skin is warm and dry. Neurological:      Mental Status: She is alert and oriented to person, place, and time. Comments: NIHSS 0. Negative phalen and tinel's sign. Psychiatric:         Mood and Affect: Mood normal.           MDM  40 yo female presents to the ED with headache, n/v, R arm numbness. Pt is afebrile, hemodynamically stable. NIHSS 0.  Pt given 1 L NS, IV reglan, IV benadryl, IV toradol, PO tylenol in the ED. Labs remarkable for ALT 99, AST 66.  CT head negative. Pt reassessed and feels much better. Pt's numbness improved. Suspect possible complex migraine. Pt educated about complex migraines. Pt given prescription for reglan, ibuprofen. Pt will f/u with pcp. Pt understands plan. FINAL IMPRESSION      1. Acute nonintractable headache, unspecified headache type    2.  Numbness          DISPOSITION/PLAN   DISPOSITION Decision To Discharge 11/15/2020 03:26:41 PM        DISCHARGE MEDICATIONS:  [unfilled]         Gabe Law MD(electronically signed)  Attending Emergency Physician            Gabe Law MD  11/15/20 5338

## 2022-04-14 ENCOUNTER — OFFICE VISIT (OUTPATIENT)
Dept: FAMILY MEDICINE CLINIC | Age: 47
End: 2022-04-14
Payer: COMMERCIAL

## 2022-04-14 VITALS
TEMPERATURE: 98.7 F | WEIGHT: 240.4 LBS | BODY MASS INDEX: 42.59 KG/M2 | DIASTOLIC BLOOD PRESSURE: 70 MMHG | SYSTOLIC BLOOD PRESSURE: 124 MMHG | HEIGHT: 63 IN | HEART RATE: 76 BPM | OXYGEN SATURATION: 98 %

## 2022-04-14 DIAGNOSIS — R73.01 IMPAIRED FASTING BLOOD SUGAR: ICD-10-CM

## 2022-04-14 DIAGNOSIS — E61.1 IRON DEFICIENCY: ICD-10-CM

## 2022-04-14 DIAGNOSIS — Z11.4 ENCOUNTER FOR SCREENING FOR HIV: ICD-10-CM

## 2022-04-14 DIAGNOSIS — K21.9 GASTROESOPHAGEAL REFLUX DISEASE WITHOUT ESOPHAGITIS: Primary | ICD-10-CM

## 2022-04-14 DIAGNOSIS — Z12.31 ENCOUNTER FOR SCREENING MAMMOGRAM FOR MALIGNANT NEOPLASM OF BREAST: ICD-10-CM

## 2022-04-14 DIAGNOSIS — Z11.59 NEED FOR HEPATITIS C SCREENING TEST: ICD-10-CM

## 2022-04-14 PROBLEM — D64.9 ANEMIA: Status: ACTIVE | Noted: 2017-08-31

## 2022-04-14 PROBLEM — J45.20 MILD INTERMITTENT ASTHMA WITHOUT COMPLICATION: Status: ACTIVE | Noted: 2017-10-10

## 2022-04-14 PROBLEM — E66.9 OBESITY, UNSPECIFIED: Status: ACTIVE | Noted: 2017-08-31

## 2022-04-14 PROCEDURE — 1036F TOBACCO NON-USER: CPT | Performed by: FAMILY MEDICINE

## 2022-04-14 PROCEDURE — G8417 CALC BMI ABV UP PARAM F/U: HCPCS | Performed by: FAMILY MEDICINE

## 2022-04-14 PROCEDURE — 99204 OFFICE O/P NEW MOD 45 MIN: CPT | Performed by: FAMILY MEDICINE

## 2022-04-14 PROCEDURE — G8427 DOCREV CUR MEDS BY ELIG CLIN: HCPCS | Performed by: FAMILY MEDICINE

## 2022-04-14 RX ORDER — OMEPRAZOLE 40 MG/1
40 CAPSULE, DELAYED RELEASE ORAL
Qty: 90 CAPSULE | Refills: 1 | Status: SHIPPED | OUTPATIENT
Start: 2022-04-14 | End: 2022-06-27 | Stop reason: SDUPTHER

## 2022-04-14 SDOH — ECONOMIC STABILITY: FOOD INSECURITY: WITHIN THE PAST 12 MONTHS, YOU WORRIED THAT YOUR FOOD WOULD RUN OUT BEFORE YOU GOT MONEY TO BUY MORE.: NEVER TRUE

## 2022-04-14 SDOH — ECONOMIC STABILITY: FOOD INSECURITY: WITHIN THE PAST 12 MONTHS, THE FOOD YOU BOUGHT JUST DIDN'T LAST AND YOU DIDN'T HAVE MONEY TO GET MORE.: NEVER TRUE

## 2022-04-14 ASSESSMENT — PATIENT HEALTH QUESTIONNAIRE - PHQ9
2. FEELING DOWN, DEPRESSED OR HOPELESS: 0
SUM OF ALL RESPONSES TO PHQ QUESTIONS 1-9: 0
SUM OF ALL RESPONSES TO PHQ9 QUESTIONS 1 & 2: 0
1. LITTLE INTEREST OR PLEASURE IN DOING THINGS: 0
SUM OF ALL RESPONSES TO PHQ QUESTIONS 1-9: 0

## 2022-04-14 ASSESSMENT — SOCIAL DETERMINANTS OF HEALTH (SDOH): HOW HARD IS IT FOR YOU TO PAY FOR THE VERY BASICS LIKE FOOD, HOUSING, MEDICAL CARE, AND HEATING?: NOT HARD AT ALL

## 2022-04-14 NOTE — PROGRESS NOTES
Chief Complaint   Patient presents with    New Patient    Establish Care        HPI: Marilou Pleitez 52 y.o. female presenting for      GERD   Takes famotidine about 2 times a day   Does not help with symptoms   Unsure of hte triggers for her acid reflux   Has not tried any other medication. Current Outpatient Medications   Medication Sig Dispense Refill    omeprazole (PRILOSEC) 40 MG delayed release capsule Take 1 capsule by mouth every morning (before breakfast) 90 capsule 1     No current facility-administered medications for this visit. ROS  CONSTITUTIONAL: The patient denies fevers, chills, sweats and body ache. Admits to weight gain. HEENT: Denies headache, blurry vision, eye pain, tinnitus, vertigo,  sore throat, neck or thyroid masses. RESPIRATORY: Denies cough, sputum, hemoptysis. CARDIAC: Denies chest pain, pressure, palpitations, Denies lower extremity edema. GASTROINTESTINAL: Denies abdominal pain, constipation, diarrhea, bleeding in the stools,   GENITOURINARY: Denies dysuria, hematuria, nocturia or frequency, urinary incontinence. NEUROLOGIC: Denies headaches, dizziness, syncope, weakness  MUSCULOSKELETAL: denies changes in range of motion, joint pain, stiffness. ENDOCRINOLOGY: Denies heat or cold intolerance. HEMATOLOGY: Denies easy bleeding or blood transfusion,anemia  DERMATOLOGY: Denies changes in moles or pigmentation changes. PSYCHIATRY: Denies depression, agitation or anxiety. No past medical history on file. Past Surgical History:   Procedure Laterality Date     SECTION      x4 emergency  c- section for the first one.     [place] DENTAL SURG[place] PROCEDURE Left 2018    REMOVAL OF TOOTH #18 WITH IRRIGARION OF ABSCESS performed by Gladys Pedraza DDS at [address] [place] [address] N/A 07/15/2018    DENTAL EXTRACTION performed [facility]ys [facility] at [facility]        Family History   Problem Relation Age of Onset    No Known Problems Father         Social History     Socioeconomic History    Marital status: Single     Spouse name: Not on file    Number of children: Not on file    Years of education: Not on file    Highest education level: Not on file   Occupational History    Not on file   Tobacco Use    Smoking status: Never Smoker    Smokeless tobacco: Never Used   Vaping Use    Vaping Use: Never used   Substance and Sexual Activity    Alcohol use: No    Drug use: No    Sexual activity: Not Currently   Other Topics Concern    Not on file   Social History Narrative    Born in [facility]     Has 4 children     Does not work . Social Determinants of Health     Financial Resource Strain: Low Risk     Difficulty of Paying Living Expenses: Not hard at all   Food Insecurity: No Food Insecurity    Worried About Running Out of Food in the Last Year: Never true    Awilda of Food in the Last Year: Never true   Transportation Needs:     Lack of Transportation (Medical): Not on file    Lack of Transportation (Non-Medical):  Not on file   Physical Activity:     Days of Exercise per Week: Not on file    Minutes of Exercise per Session: Not on file   Stress:     Feeling of Stress : Not on file   Social Connections:     Frequency of Communication with Friends and Family: Not on file    Frequency of Social Gatherings with Friends and Family: Not on file    Attends Adventist Services: Not on file    Active Member of [address] [place] [address] or Organizations: Not on file    Attends Club or Organization Meetings: Not on file    Marital Status: Not on file   Intimate Partner Violence:     Fear of Current or Ex-Partner: Not on file    Emotionally Abused: Not on file    Physically Abused: Not on file    Sexually Abused: Not on file   Housing Stability:     Unable to Pay for Housing in the Last Year: Not on file    Number of [place] in the Last Year: Not on file    Unstable Housing in the Last Year: Not on file        /70   Pulse 76 Temp 98.7 °F (37.1 °C) (Oral)   Ht 5' 3\" (1.6 m)   Wt 240 lb 6.4 oz (109 kg)   SpO2 98%   BMI 42.58 kg/m²        Physical Exam:    General appearance - alert, well appearing, and in no distress, obesity   Mental Status - alert, oriented to person, place, and time  Eyes - pupils equal and reactive, extraocular eye movements intact   Ears - bilateral TM's and external ear canals normal   Nose - normal and patent, no erythema, discharge or polyps   Sinuses - Normal paranasal sinuses without tenderness   Throat - mucous membranes moist, pharynx normal without lesions   Neck - supple, no significant adenopathy   Thyroid - thyroid is normal in size without nodules or tenderness    Chest - clear to auscultation, no wheezes, rales or rhonchi, symmetric air entry   Heart - normal rate, regular rhythm, normal S1, S2, no murmurs, rubs, clicks or gallops  Abdomen - soft, nontender, nondistended, no masses or organomegaly   Back exam - full range of motion, no tenderness, palpable spasm or pain on motion   Neurological - alert, oriented, normal speech, no focal findings or movement disorder noted   Musculoskeletal - no joint tenderness, deformity or swelling   Extremities - peripheral pulses normal, no pedal edema, no clubbing or cyanosis   Skin - normal coloration and turgor, no rashes, no suspicious skin lesions noted      Labs   No results found for: TSHREFLEX  TSH   Date Value Ref Range Status   07/18/2018 1.410 0.270 - 4.200 uIU/mL Final     Lab Results   Component Value Date     11/15/2020    K 3.7 11/15/2020     11/15/2020    CO2 24 11/15/2020    BUN 8 11/15/2020    CREATININE 0.79 11/15/2020    GLUCOSE 105 (H) 11/15/2020    CALCIUM 8.9 11/15/2020    PROT 7.6 11/15/2020    LABALBU 4.0 11/15/2020    BILITOT 0.4 11/15/2020    ALKPHOS 88 11/15/2020    AST 66 (H) 11/15/2020    ALT 99 (H) 11/15/2020    LABGLOM >60.0 11/15/2020    GFRAA >60.0 11/15/2020    GLOB 3.6 (H) 11/15/2020       Lab Results   Component Value Date    WBC 8.5 11/15/2020    HGB 12.9 11/15/2020    HCT 39.3 11/15/2020    MCV 73.8 (L) 11/15/2020     11/15/2020     No results found for: LABA1C  No results found for: EAG        A/P: Emerson Nicholson 52 y.o. female presenting for     1. Gastroesophageal reflux disease without esophagitis    - omeprazole (PRILOSEC) 40 MG delayed release capsule; Take 1 capsule by mouth every morning (before breakfast)  Dispense: 90 capsule; Refill: 1    2. Encounter for screening mammogram for malignant neoplasm of breast    - MEJIA DIGITAL SCREEN W OR WO CAD BILATERAL; Future    3. Need for hepatitis C screening test    - Hepatitis C Antibody; Future    4. Encounter for screening for HIV    - HIV Screen; Future    5. Iron deficiency    - CBC with Auto Differential; Future  - Iron and TIBC; Future  - Comprehensive Metabolic Panel; Future    6. Impaired fasting blood sugar    - Hemoglobin A1C; Future          Please note, this report has been partially produced using speech recognition software  and may cause  and /or contain errors related to that system including grammar, punctuation and spelling as well as words and phrases that may seem inappropriate. If there are questions or concerns please feel free to contact me to clarify.

## 2022-04-14 NOTE — PATIENT INSTRUCTIONS
Patient Education        Gastroesophageal Reflux Disease (GERD): Care Instructions  Overview     Gastroesophageal reflux disease (GERD) is the backward flow of stomach acid into the esophagus. The esophagus is the tube that leads from your throat to your stomach. A one-way valve prevents the stomach acid from backing up into this tube. But when you have GERD, this valve does not close tightly enough. This can also cause pain and swelling in your esophagus. (This is calledesophagitis.)  If you have mild GERD symptoms including heartburn, you may be able to control the problem with antacids or over-the-counter medicine. You can also make lifestyle changes to help reduce your symptoms. These include changing yourdiet and eating habits, such as not eating late at night and losing weight. Follow-up care is a key part of your treatment and safety. Be sure to make and go to all appointments, and call your doctor if you are having problems. It's also a good idea to know your test results and keep alist of the medicines you take. How can you care for yourself at home?  Take your medicines exactly as prescribed. Call your doctor if you think you are having a problem with your medicine.  Your doctor may recommend over-the-counter medicine. For mild or occasional indigestion, antacids, such as Tums, Gaviscon, Mylanta, or Maalox, may help. Your doctor also may recommend over-the-counter acid reducers, such as famotidine (Pepcid AC), cimetidine (Tagamet HB), or omeprazole (Prilosec). Read and follow all instructions on the label. If you use these medicines often, talk with your doctor.  Change your eating habits. ? It's best to eat several small meals instead of two or three large meals. ? After you eat, wait 2 to 3 hours before you lie down. ? Avoid foods that make your symptoms worse.  These may include chocolate, mint, alcohol, pepper, spicy foods, high-fat foods, or drinks with caffeine in them, such as tea, coffee, jam, or energy drinks. If your symptoms are worse after you eat a certain food, you may want to stop eating it to see if your symptoms get better.  Do not smoke or chew tobacco. Smoking can make GERD worse. If you need help quitting, talk to your doctor about stop-smoking programs and medicines. These can increase your chances of quitting for good.  If you have GERD symptoms at night, raise the head of your bed 6 to 8 inches by putting the frame on blocks or placing a foam wedge under the head of your mattress. (Adding extra pillows does not work.)   Do not wear tight clothing around your middle.  Lose weight if you need to. Losing just 5 to 10 pounds can help. When should you call for help? Call your doctor now or seek immediate medical care if:     You have new or different belly pain.      Your stools are black and tarlike or have streaks of blood. Watch closely for changes in your health, and be sure to contact your doctor if:     Your symptoms have not improved after 2 days.      Food seems to catch in your throat or chest.   Where can you learn more? Go to [email or web address] and sign in to your [employer] account. Enter P477 in the [employer] box to learn more about \"Gastroesophageal Reflux Disease (GERD): Care Instructions. \"     If you do not have an account, please click on the \"Sign Up Now\" link. Current as of: September 8, 2021               Content Version: 13.2  © 3946-6336 [employer], [facility]. Care instructions adapted under license by [facility] ([facility]). If you have questions about a medical condition or this instruction, always ask your healthcare professional. [place] [address] any warranty or liability for your use of this information.

## 2022-05-03 ENCOUNTER — HOSPITAL ENCOUNTER (OUTPATIENT)
Dept: WOMENS IMAGING | Age: 47
Discharge: HOME | End: 2022-05-05
Payer: COMMERCIAL

## 2022-05-03 VITALS — HEIGHT: 63 IN | WEIGHT: 240 LBS | BODY MASS INDEX: 42.52 KG/M2

## 2022-05-03 DIAGNOSIS — R73.01 IMPAIRED FASTING BLOOD SUGAR: ICD-10-CM

## 2022-05-03 DIAGNOSIS — Z11.4 ENCOUNTER FOR SCREENING FOR HIV: ICD-10-CM

## 2022-05-03 DIAGNOSIS — E61.1 IRON DEFICIENCY: ICD-10-CM

## 2022-05-03 DIAGNOSIS — Z12.31 ENCOUNTER FOR SCREENING MAMMOGRAM FOR MALIGNANT NEOPLASM OF BREAST: ICD-10-CM

## 2022-05-03 DIAGNOSIS — Z11.59 NEED FOR HEPATITIS C SCREENING TEST: ICD-10-CM

## 2022-05-03 LAB
ALBUMIN SERPL-MCNC: 4.1 G/DL (ref 3.5–4.6)
ALP BLD-CCNC: 96 U/L (ref 40–130)
ALT SERPL-CCNC: 84 U/L (ref 0–33)
ANION GAP SERPL CALCULATED.3IONS-SCNC: 12 MEQ/L (ref 9–15)
ANISOCYTOSIS: ABNORMAL
AST SERPL-CCNC: 57 U/L (ref 0–35)
BASOPHILS ABSOLUTE: 0 K/UL (ref 0–0.2)
BASOPHILS RELATIVE PERCENT: 1 %
BILIRUB SERPL-MCNC: 0.6 MG/DL (ref 0.2–0.7)
BUN BLDV-MCNC: 13 MG/DL (ref 6–20)
CALCIUM SERPL-MCNC: 9.3 MG/DL (ref 8.5–9.9)
CHLORIDE BLD-SCNC: 101 MEQ/L (ref 95–107)
CO2: 26 MEQ/L (ref 20–31)
CREAT SERPL-MCNC: 0.79 MG/DL (ref 0.5–0.9)
EOSINOPHILS ABSOLUTE: 0.3 K/UL (ref 0–0.7)
EOSINOPHILS RELATIVE PERCENT: 5 %
GFR AFRICAN AMERICAN: >60
GFR NON-AFRICAN AMERICAN: >60
GLOBULIN: 3.5 G/DL (ref 2.3–3.5)
GLUCOSE BLD-MCNC: 109 MG/DL (ref 70–99)
HBA1C MFR BLD: 5.7 % (ref 4.8–5.9)
HCT VFR BLD CALC: 33.1 % (ref 37–47)
HEMOGLOBIN: 10 G/DL (ref 12–16)
LYMPHOCYTES ABSOLUTE: 1.2 K/UL (ref 1–4.8)
LYMPHOCYTES RELATIVE PERCENT: 24 %
MCH RBC QN AUTO: 19.9 PG (ref 27–31.3)
MCHC RBC AUTO-ENTMCNC: 30.3 % (ref 33–37)
MCV RBC AUTO: 65.6 FL (ref 82–100)
MICROCYTES: ABNORMAL
MONOCYTES ABSOLUTE: 0.4 K/UL (ref 0.2–0.8)
MONOCYTES RELATIVE PERCENT: 7.6 %
MYELOCYTE PERCENT: 1 %
NEUTROPHILS ABSOLUTE: 3.3 K/UL (ref 1.4–6.5)
NEUTROPHILS RELATIVE PERCENT: 63 %
OVALOCYTES: ABNORMAL
PDW BLD-RTO: 18.2 % (ref 11.5–14.5)
PLATELET # BLD: 262 K/UL (ref 130–400)
PLATELET SLIDE REVIEW: NORMAL
POIKILOCYTES: ABNORMAL
POLYCHROMASIA: ABNORMAL
POTASSIUM SERPL-SCNC: 4.6 MEQ/L (ref 3.4–4.9)
RBC # BLD: 5.04 M/UL (ref 4.2–5.4)
SODIUM BLD-SCNC: 139 MEQ/L (ref 135–144)
TOTAL PROTEIN: 7.6 G/DL (ref 6.3–8)
WBC # BLD: 5.1 K/UL (ref 4.8–10.8)

## 2022-05-03 PROCEDURE — 77063 BREAST TOMOSYNTHESIS BI: CPT

## 2022-05-04 LAB
HEPATITIS C ANTIBODY: NONREACTIVE
HIV AG/AB: NONREACTIVE
IRON SATURATION: 6 % (ref 20–55)
IRON: 24 UG/DL (ref 37–145)
TOTAL IRON BINDING CAPACITY: 433 UG/DL (ref 250–450)
UNSATURATED IRON BINDING CAPACITY: 409 UG/DL (ref 112–347)

## 2022-06-27 ENCOUNTER — OFFICE VISIT (OUTPATIENT)
Dept: FAMILY MEDICINE CLINIC | Age: 47
End: 2022-06-27
Payer: COMMERCIAL

## 2022-06-27 VITALS
DIASTOLIC BLOOD PRESSURE: 70 MMHG | BODY MASS INDEX: 42.38 KG/M2 | TEMPERATURE: 97.6 F | OXYGEN SATURATION: 98 % | HEIGHT: 63 IN | WEIGHT: 239.2 LBS | SYSTOLIC BLOOD PRESSURE: 122 MMHG | HEART RATE: 83 BPM

## 2022-06-27 DIAGNOSIS — E66.01 CLASS 3 SEVERE OBESITY DUE TO EXCESS CALORIES WITH BODY MASS INDEX (BMI) OF 40.0 TO 44.9 IN ADULT, UNSPECIFIED WHETHER SERIOUS COMORBIDITY PRESENT (HCC): ICD-10-CM

## 2022-06-27 DIAGNOSIS — Z13.220 LIPID SCREENING: ICD-10-CM

## 2022-06-27 DIAGNOSIS — K21.9 GASTROESOPHAGEAL REFLUX DISEASE WITHOUT ESOPHAGITIS: Primary | ICD-10-CM

## 2022-06-27 PROBLEM — K36 OTHER APPENDICITIS: Status: ACTIVE | Noted: 2017-08-31

## 2022-06-27 PROBLEM — K35.80 UNSPECIFIED ACUTE APPENDICITIS: Status: ACTIVE | Noted: 2017-08-31

## 2022-06-27 LAB
CHOLESTEROL, FASTING: 112 MG/DL (ref 0–199)
HDLC SERPL-MCNC: 42 MG/DL (ref 40–59)
LDL CHOLESTEROL CALCULATED: 55 MG/DL (ref 0–129)
TRIGLYCERIDE, FASTING: 73 MG/DL (ref 0–150)

## 2022-06-27 PROCEDURE — 99213 OFFICE O/P EST LOW 20 MIN: CPT | Performed by: FAMILY MEDICINE

## 2022-06-27 PROCEDURE — 1036F TOBACCO NON-USER: CPT | Performed by: FAMILY MEDICINE

## 2022-06-27 PROCEDURE — G8417 CALC BMI ABV UP PARAM F/U: HCPCS | Performed by: FAMILY MEDICINE

## 2022-06-27 PROCEDURE — G8427 DOCREV CUR MEDS BY ELIG CLIN: HCPCS | Performed by: FAMILY MEDICINE

## 2022-06-27 RX ORDER — PHENTERMINE HYDROCHLORIDE 37.5 MG/1
37.5 TABLET ORAL
Qty: 30 TABLET | Refills: 0 | Status: SHIPPED | OUTPATIENT
Start: 2022-06-27 | End: 2022-06-28 | Stop reason: SDUPTHER

## 2022-06-27 RX ORDER — OMEPRAZOLE 40 MG/1
40 CAPSULE, DELAYED RELEASE ORAL
Qty: 90 CAPSULE | Refills: 1 | Status: SHIPPED | OUTPATIENT
Start: 2022-06-27 | End: 2022-06-28 | Stop reason: SDUPTHER

## 2022-06-27 NOTE — PROGRESS NOTES
Chief Complaint   Patient presents with    Follow-up    Gastroesophageal Reflux     Pt needs omeprazole refill. No other issues/concerns        HPI: Shelton Walsh 52 y.o. female presenting for      GERD   Takes famotidine about 2 times a day   Does not help with symptoms   Unsure of hte triggers for her acid reflux   Has not tried any other medication. F/u   Unable to  the medication   GERD not controlled. Still taking the pepcid. Obesity   Has been gaining weight   Tried weight loss fads with no success   Would like medication to help lose weight       Current Outpatient Medications   Medication Sig Dispense Refill    omeprazole (PRILOSEC) 40 MG delayed release capsule Take 1 capsule by mouth every morning (before breakfast) 90 capsule 1     No current facility-administered medications for this visit. ROS  CONSTITUTIONAL: The patient denies fevers, chills, sweats and body ache. Admits to weight gain. HEENT: Denies headache, blurry vision, eye pain, tinnitus, vertigo,  sore throat, neck or thyroid masses. RESPIRATORY: Denies cough, sputum, hemoptysis. CARDIAC: Denies chest pain, pressure, palpitations, Denies lower extremity edema. GASTROINTESTINAL: Denies abdominal pain, constipation, diarrhea, bleeding in the stools,   GENITOURINARY: Denies dysuria, hematuria, nocturia or frequency, urinary incontinence. NEUROLOGIC: Denies headaches, dizziness, syncope, weakness  MUSCULOSKELETAL: denies changes in range of motion, joint pain, stiffness. ENDOCRINOLOGY: Denies heat or cold intolerance. HEMATOLOGY: Denies easy bleeding or blood transfusion,anemia  DERMATOLOGY: Denies changes in moles or pigmentation changes. PSYCHIATRY: Denies depression, agitation or anxiety. No past medical history on file. Past Surgical History:   Procedure Laterality Date     SECTION      x4 emergency  c- section for the first one.     [place] DENTAL SURGERY PROCEDURE Left 2018 REMOVAL OF TOOTH #18 WITH IRRIGARION OF ABSCESS performed by Elvin Pagan DDS at [address] [place] [address] N/A 07/15/2018    DENTAL EXTRACTION performed by Elvin Pagan DDS at [facility]        Family History   Problem Relation Age of Onset    No Known Problems Father         Social History     Socioeconomic History    Marital status: Single     Spouse name: Not on file    Number of children: Not on file    Years of education: Not on file    Highest education level: Not on file   Occupational History    Not on file   Tobacco Use    Smoking status: Never Smoker    Smokeless tobacco: Never Used   Vaping Use    Vaping Use: Never used   Substance and Sexual Activity    Alcohol use: No    Drug use: No    Sexual activity: Not Currently   Other Topics Concern    Not on file   Social History Narrative    Born in [facility]     Has 4 children     Does not work . Social Determinants of Health     Financial Resource Strain: Low Risk     Difficulty of Paying Living Expenses: Not hard at all   Food Insecurity: No Food Insecurity    Worried About Running Out of Food in the Last Year: Never true    [place]ilda of Food in the Last Year: Never true   Transportation Needs:     Lack of Transportation (Medical): Not on file    Lack of Transportation (Non-Medical):  Not on file   Physical Activity:     Days of Exercise per Week: Not on file    Minutes of Exercise per Session: Not on file   Stress:     Feeling of Stress : Not on file   Social Connections:     Frequency of Communication with Friends and Family: Not on file    Frequency of Social Gatherings with Friends and Family: Not on file    Attends Mu-ism Services: Not on file    Active Member of Clubs or Organizations: Not on file    Attends Club or Organization Meetings: Not on file    Marital Status: Not on file   Intimate Partner Violence:     Fear of Current or Ex-Partner: Not on file    Emotionally Abused: Not on file   [address] [place] [address] Physically Abused: Not on file    Sexually Abused: Not on file   Housing Stability:     Unable to Pay for Housing in the Last Year: Not on file    Number of [place] in the Last Year: Not on file    Unstable Housing in the Last Year: Not on file        /70   Pulse 83   Temp 97.6 °F (36.4 °C) (Temporal)   Ht 5' 3\" (1.6 m)   Wt 239 lb 3.2 oz (108.5 kg)   SpO2 98%   BMI 42.37 kg/m²        Physical Exam:    General appearance - alert, well appearing, and in no distress, obesity   Mental Status - alert, oriented to person, place, and time  Eyes - pupils equal and reactive, extraocular eye movements intact   Ears - bilateral TM's and external ear canals normal   Nose - normal and patent, no erythema, discharge or polyps   Sinuses - Normal paranasal sinuses without tenderness   Throat - mucous membranes moist, pharynx normal without lesions   Neck - supple, no significant adenopathy   Thyroid - thyroid is normal in size without nodules or tenderness    Chest - clear to auscultation, no wheezes, rales or rhonchi, symmetric air entry   Heart - normal rate, regular rhythm, normal S1, S2, no murmurs, rubs, clicks or gallops  Abdomen - soft, nontender, nondistended, no masses or organomegaly   Back exam - full range of motion, no tenderness, palpable spasm or pain on motion   Neurological - alert, oriented, normal speech, no focal findings or movement disorder noted   Musculoskeletal - no joint tenderness, deformity or swelling   Extremities - peripheral pulses normal, no pedal edema, no clubbing or cyanosis   Skin - normal coloration and turgor, no rashes, no suspicious skin lesions noted      Labs   No results found for: TSHREFLEX  TSH   Date Value Ref Range Status   07/18/2018 1.410 0.270 - 4.200 uIU/mL Final     Lab Results   Component Value Date     05/03/2022    K 4.6 05/03/2022     05/03/2022    CO2 26 05/03/2022    BUN 13 05/03/2022    CREATININE 0.79 05/03/2022    GLUCOSE 109 (H) 05/03/2022    CALCIUM 9.3 05/03/2022    PROT 7.6 05/03/2022    LABALBU 4.1 05/03/2022    BILITOT 0.6 05/03/2022    ALKPHOS 96 05/03/2022    AST 57 (H) 05/03/2022    ALT 84 (H) 05/03/2022    LABGLOM >60.0 05/03/2022    GFRAA >60.0 05/03/2022    GLOB 3.5 05/03/2022       Lab Results   Component Value Date    WBC 5.1 05/03/2022    HGB 10.0 (L) 05/03/2022    HCT 33.1 (L) 05/03/2022    MCV 65.6 (L) 05/03/2022     05/03/2022     Lab Results   Component Value Date    LABA1C 5.7 05/03/2022     No results found for: EAG        A/P: Xavier Nicholson 52 y.o. female presenting for     1. Gastroesophageal reflux disease without esophagitis  Ordered omperazole   Will see If this can better control symptoms. Patient wants medication to go to a different pharmacy. (signed in a different encounter)     2. Lipid screening    - Lipid, Fasting; Future    3. Class 3 severe obesity due to excess calories with body mass index (BMI) of 40.0 to 44.9 in adult, unspecified whether serious comorbidity present ([facility] [address]  Start adipex. Please note, this report has been partially produced using speech recognition software  and may cause  and /or contain errors related to that system including grammar, punctuation and spelling as well as words and phrases that may seem inappropriate. If there are questions or concerns please feel free to contact me to clarify.

## 2022-06-28 DIAGNOSIS — K21.9 GASTROESOPHAGEAL REFLUX DISEASE WITHOUT ESOPHAGITIS: ICD-10-CM

## 2022-06-28 DIAGNOSIS — E66.01 CLASS 3 SEVERE OBESITY DUE TO EXCESS CALORIES WITH BODY MASS INDEX (BMI) OF 40.0 TO 44.9 IN ADULT, UNSPECIFIED WHETHER SERIOUS COMORBIDITY PRESENT (HCC): ICD-10-CM

## 2022-06-28 RX ORDER — OMEPRAZOLE 40 MG/1
40 CAPSULE, DELAYED RELEASE ORAL
Qty: 90 CAPSULE | Refills: 1 | Status: SHIPPED | OUTPATIENT
Start: 2022-06-28 | End: 2022-08-22 | Stop reason: SDUPTHER

## 2022-06-28 RX ORDER — PHENTERMINE HYDROCHLORIDE 37.5 MG/1
37.5 TABLET ORAL
Qty: 30 TABLET | Refills: 0 | Status: SHIPPED | OUTPATIENT
Start: 2022-06-28 | End: 2022-07-25 | Stop reason: SDUPTHER

## 2022-07-25 ENCOUNTER — OFFICE VISIT (OUTPATIENT)
Dept: FAMILY MEDICINE CLINIC | Age: 47
End: 2022-07-25
Payer: COMMERCIAL

## 2022-07-25 VITALS
HEIGHT: 63 IN | TEMPERATURE: 97.5 F | HEART RATE: 76 BPM | DIASTOLIC BLOOD PRESSURE: 78 MMHG | BODY MASS INDEX: 41.92 KG/M2 | WEIGHT: 236.6 LBS | OXYGEN SATURATION: 98 % | SYSTOLIC BLOOD PRESSURE: 126 MMHG

## 2022-07-25 DIAGNOSIS — E66.01 CLASS 3 SEVERE OBESITY DUE TO EXCESS CALORIES WITH BODY MASS INDEX (BMI) OF 40.0 TO 44.9 IN ADULT, UNSPECIFIED WHETHER SERIOUS COMORBIDITY PRESENT (HCC): ICD-10-CM

## 2022-07-25 PROCEDURE — 99213 OFFICE O/P EST LOW 20 MIN: CPT | Performed by: FAMILY MEDICINE

## 2022-07-25 RX ORDER — PHENTERMINE HYDROCHLORIDE 37.5 MG/1
37.5 TABLET ORAL
Qty: 30 TABLET | Refills: 0 | Status: SHIPPED | OUTPATIENT
Start: 2022-07-25 | End: 2022-08-22 | Stop reason: SDUPTHER

## 2022-07-25 NOTE — PROGRESS NOTES
Chief Complaint   Patient presents with    1 Month Follow-Up    Weight Management     Pt reports adipex is doing well. Sometimes she has days where she is more hungry. HPI: Pepe Carlisle 52 y.o. female presenting for      GERD   Takes famotidine about 2 times a day   Does not help with symptoms   Unsure of hte triggers for her acid reflux   Has not tried any other medication. F/u   Unable to  the medication   GERD not controlled. Still taking the pepcid. Obesity   Has been gaining weight   Tried weight loss fads with no success   Would like medication to help lose weight     F/u   Lost some weight   Admits that her diet could be better   Compliant with the  medicaiton. Current Outpatient Medications   Medication Sig Dispense Refill    phentermine (ADIPEX-P) 37.5 MG tablet Take 1 tablet by mouth every morning (before breakfast) for 30 days. 30 tablet 0    omeprazole (PRILOSEC) 40 MG delayed release capsule Take 1 capsule by mouth every morning (before breakfast) 90 capsule 1     No current facility-administered medications for this visit. ROS  CONSTITUTIONAL: The patient denies fevers, chills, sweats and body ache. Admits to weight  loss   HEENT: Denies headache, blurry vision, eye pain, tinnitus, vertigo,  sore throat, neck or thyroid masses. RESPIRATORY: Denies cough, sputum, hemoptysis. CARDIAC: Denies chest pain, pressure, palpitations, Denies lower extremity edema. GASTROINTESTINAL: Denies abdominal pain, constipation, diarrhea, bleeding in the stools,   GENITOURINARY: Denies dysuria, hematuria, nocturia or frequency, urinary incontinence. NEUROLOGIC: Denies headaches, dizziness, syncope, weakness  MUSCULOSKELETAL: denies changes in range of motion, joint pain, stiffness. ENDOCRINOLOGY: Denies heat or cold intolerance. HEMATOLOGY: Denies easy bleeding or blood transfusion,anemia  DERMATOLOGY: Denies changes in moles or pigmentation changes.   PSYCHIATRY: Denies depression, agitation or anxiety. No past medical history on file. Past Surgical History:   Procedure Laterality Date     SECTION      x4 emergency  c- section for the first one. [place] DENTAL SURGERY PROCEDURE Left 2018    REMOVAL OF TOOTH #18 WITH IRRIGARION OF ABSCESS performed by Selena Aquino DDS at [facility] N/A 07/15/2018    DENTAL EXTRACTION performed by Selena Aquino DDS at [facility]        Family History   Problem Relation Age of Onset    No Known Problems Father         Social History     Socioeconomic History    Marital status: Single     Spouse name: Not on file    Number of children: Not on file    Years of education: Not on file    Highest education level: Not on file   Occupational History    Not on file   Tobacco Use    Smoking status: Never    Smokeless tobacco: Never   Vaping Use    Vaping Use: Never used   Substance and Sexual Activity    Alcohol use: No    Drug use: No    Sexual activity: Not Currently   Other Topics Concern    Not on file   Social History Narrative    Born in [facility]     Has 4 children     Does not work .      Social Determinants of Health     Financial Resource Strain: Low Risk     Difficulty of Paying Living Expenses: Not hard at all   Food Insecurity: No Food Insecurity    Worried About Running Out of Food in the Last Year: Never true    Ran Out of Food in the Last Year: Never true   Transportation Needs: Not on file   Physical Activity: Not on file   Stress: Not on file   Social Connections: Not on file   Intimate Partner Violence: Not on file   Housing Stability: Not on file        /78   Pulse 76   Temp 97.5 °F (36.4 °C) (Temporal)   Ht 5' 3\" (1.6 m)   Wt 236 lb 9.6 oz (107.3 kg)   SpO2 98%   BMI 41.91 kg/m²        Physical Exam:    General appearance - alert, well appearing, and in no distress, obesity   Mental Status - alert, oriented to person, place, and time  Eyes - pupils equal and reactive, extraocular eye movements intact   Ears - bilateral TM's and external ear canals normal   Nose - normal and patent, no erythema, discharge or polyps   Sinuses - Normal paranasal sinuses without tenderness   Throat - mucous membranes moist, pharynx normal without lesions   Neck - supple, no significant adenopathy   Thyroid - thyroid is normal in size without nodules or tenderness    Chest - clear to auscultation, no wheezes, rales or rhonchi, symmetric air entry   Heart - normal rate, regular rhythm, normal S1, S2, no murmurs, rubs, clicks or gallops  Abdomen - soft, nontender, nondistended, no masses or organomegaly   Back exam - full range of motion, no tenderness, palpable spasm or pain on motion   Neurological - alert, oriented, normal speech, no focal findings or movement disorder noted   Musculoskeletal - no joint tenderness, deformity or swelling   Extremities - peripheral pulses normal, no pedal edema, no clubbing or cyanosis   Skin - normal coloration and turgor, no rashes, no suspicious skin lesions noted      Labs   No results found for: TSHREFLEX  TSH   Date Value Ref Range Status   07/18/2018 1.410 0.270 - 4.200 uIU/mL Final     Lab Results   Component Value Date     05/03/2022    K 4.6 05/03/2022     05/03/2022    CO2 26 05/03/2022    BUN 13 05/03/2022    CREATININE 0.79 05/03/2022    GLUCOSE 109 (H) 05/03/2022    CALCIUM 9.3 05/03/2022    PROT 7.6 05/03/2022    LABALBU 4.1 05/03/2022    BILITOT 0.6 05/03/2022    ALKPHOS 96 05/03/2022    AST 57 (H) 05/03/2022    ALT 84 (H) 05/03/2022    LABGLOM >60.0 05/03/2022    GFRAA >60.0 05/03/2022    GLOB 3.5 05/03/2022       Lab Results   Component Value Date    WBC 5.1 05/03/2022    HGB 10.0 (L) 05/03/2022    HCT 33.1 (L) 05/03/2022    MCV 65.6 (L) 05/03/2022     05/03/2022     Lab Results   Component Value Date    LABA1C 5.7 05/03/2022     No results found for: EAG        A/P: Mitch Nicholson 52 y.o. female presenting for     1.  Class 3 severe obesity due to excess calories with body mass index (BMI) of 40.0 to 44.9 in adult, unspecified whether [facility]  Counseled. - phentermine (ADIPEX-P) 37.5 MG tablet; Take 1 tablet by mouth every morning (before breakfast) for 30 days. Dispense: 30 tablet; Refill: 0              Please note, this report has been partially produced using speech recognition software  and may cause  and /or contain errors related to that system including grammar, punctuation and spelling as well as words and phrases that may seem inappropriate. If there are questions or concerns please feel free to contact me to clarify.

## 2022-08-22 ENCOUNTER — OFFICE VISIT (OUTPATIENT)
Dept: FAMILY MEDICINE CLINIC | Age: 47
End: 2022-08-22
Payer: COMMERCIAL

## 2022-08-22 VITALS
DIASTOLIC BLOOD PRESSURE: 72 MMHG | HEIGHT: 63 IN | BODY MASS INDEX: 41.57 KG/M2 | SYSTOLIC BLOOD PRESSURE: 124 MMHG | WEIGHT: 234.6 LBS | TEMPERATURE: 97.7 F | OXYGEN SATURATION: 97 % | HEART RATE: 75 BPM

## 2022-08-22 DIAGNOSIS — E66.01 CLASS 3 SEVERE OBESITY DUE TO EXCESS CALORIES WITH BODY MASS INDEX (BMI) OF 40.0 TO 44.9 IN ADULT, UNSPECIFIED WHETHER SERIOUS COMORBIDITY PRESENT (HCC): ICD-10-CM

## 2022-08-22 DIAGNOSIS — K21.9 GASTROESOPHAGEAL REFLUX DISEASE WITHOUT ESOPHAGITIS: ICD-10-CM

## 2022-08-22 PROCEDURE — G8427 DOCREV CUR MEDS BY ELIG CLIN: HCPCS | Performed by: FAMILY MEDICINE

## 2022-08-22 PROCEDURE — 1036F TOBACCO NON-USER: CPT | Performed by: FAMILY MEDICINE

## 2022-08-22 PROCEDURE — G8417 CALC BMI ABV UP PARAM F/U: HCPCS | Performed by: FAMILY MEDICINE

## 2022-08-22 PROCEDURE — 99214 OFFICE O/P EST MOD 30 MIN: CPT | Performed by: FAMILY MEDICINE

## 2022-08-22 RX ORDER — PHENTERMINE HYDROCHLORIDE 37.5 MG/1
37.5 TABLET ORAL
Qty: 30 TABLET | Refills: 0 | Status: SHIPPED | OUTPATIENT
Start: 2022-08-22 | End: 2022-09-21

## 2022-08-22 RX ORDER — TOPIRAMATE 25 MG/1
25 TABLET ORAL NIGHTLY
Qty: 60 TABLET | Refills: 3 | Status: SHIPPED | OUTPATIENT
Start: 2022-08-22 | End: 2022-10-21 | Stop reason: DRUGHIGH

## 2022-08-22 RX ORDER — OMEPRAZOLE 40 MG/1
40 CAPSULE, DELAYED RELEASE ORAL
Qty: 90 CAPSULE | Refills: 1 | Status: SHIPPED | OUTPATIENT
Start: 2022-08-22 | End: 2022-10-21 | Stop reason: SDUPTHER

## 2022-08-22 NOTE — PROGRESS NOTES
Chief Complaint   Patient presents with    1 Month Follow-Up    Weight Management     Pt doing well on adipex        HPI: Bearl Ring 52 y.o. female presenting for      GERD   Takes famotidine about 2 times a day   Does not help with symptoms   Unsure of hte triggers for her acid reflux   Has not tried any other medication. F/u   Unable to  the medication   GERD not controlled. Still taking the pepcid. Obesity   Has been gaining weight   Tried weight loss fads with no success   Would like medication to help lose weight     F/u   Lost some weight   Admits that her diet could be better   Compliant with the  medicaiton. Still has issues with eating at night. Current Outpatient Medications   Medication Sig Dispense Refill    phentermine (ADIPEX-P) 37.5 MG tablet Take 1 tablet by mouth every morning (before breakfast) for 30 days. 30 tablet 0    omeprazole (PRILOSEC) 40 MG delayed release capsule Take 1 capsule by mouth every morning (before breakfast) 90 capsule 1     No current facility-administered medications for this visit. ROS  CONSTITUTIONAL: The patient denies fevers, chills, sweats and body ache. Admits to weight  loss   HEENT: Denies headache, blurry vision, eye pain, tinnitus, vertigo,  sore throat, neck or thyroid masses. RESPIRATORY: Denies cough, sputum, hemoptysis. CARDIAC: Denies chest pain, pressure, palpitations, Denies lower extremity edema. GASTROINTESTINAL: Denies abdominal pain, constipation, diarrhea, bleeding in the stools,   GENITOURINARY: Denies dysuria, hematuria, nocturia or frequency, urinary incontinence. NEUROLOGIC: Denies headaches, dizziness, syncope, weakness  MUSCULOSKELETAL: denies changes in range of motion, joint pain, stiffness. ENDOCRINOLOGY: Denies heat or cold intolerance. HEMATOLOGY: Denies easy bleeding or blood transfusion,anemia  DERMATOLOGY: Denies changes in moles or pigmentation changes.   PSYCHIATRY: Denies depression, agitation or anxiety. No past medical history on file. Past Surgical History:   Procedure Laterality Date     SECTION      x4 emergency  c- section for the first one. [place] DENTAL SURGERY PROCEDURE Left 2018    REMOVAL OF TOOTH #18 WITH IRRIGARION OF ABSCESS performed by Britney Xiong DDS at [facility] N/A 07/15/2018    DENTAL EXTRACTION performed by Britney Xiong DDS at [facility]        Family History   Problem Relation Age of Onset    No Known Problems Father         Social History     Socioeconomic History    Marital status: Single     Spouse name: Not on file    Number of children: Not on file    Years of education: Not on file    Highest education level: Not on file   Occupational History    Not on file   Tobacco Use    Smoking status: Never    Smokeless tobacco: Never   Vaping Use    Vaping Use: Never used   Substance and Sexual Activity    Alcohol use: No    Drug use: No    Sexual activity: Not Currently   Other Topics Concern    Not on file   Social History Narrative    Born in [facility]     Has 4 children     Does not work .      Social Determinants of Health     Financial Resource Strain: Low Risk     Difficulty of Paying Living Expenses: Not hard at all   Food Insecurity: No Food Insecurity    Worried About Running Out of Food in the Last Year: Never true    Ran Out of Food in the Last Year: Never true   Transportation Needs: Not on file   Physical Activity: Not on file   Stress: Not on file   Social Connections: Not on file   Intimate Partner Violence: Not on file   Housing Stability: Not on file        /72   Pulse 75   Temp 97.7 °F (36.5 °C) (Temporal)   Ht 5' 3\" (1.6 m)   Wt 234 lb 9.6 oz (106.4 kg)   SpO2 97%   BMI 41.56 kg/m²        Physical Exam:    General appearance - alert, well appearing, and in no distress, obesity   Mental Status - alert, oriented to person, place, and time  Eyes - pupils equal and reactive, extraocular eye movements intact   Ears - bilateral TM's and external ear canals normal   Nose - normal and patent, no erythema, discharge or polyps   Sinuses - Normal paranasal sinuses without tenderness   Throat - mucous membranes moist, pharynx normal without lesions   Neck - supple, no significant adenopathy   Thyroid - thyroid is normal in size without nodules or tenderness    Chest - clear to auscultation, no wheezes, rales or rhonchi, symmetric air entry   Heart - normal rate, regular rhythm, normal S1, S2, no murmurs, rubs, clicks or gallops  Abdomen - soft, nontender, nondistended, no masses or organomegaly   Back exam - full range of motion, no tenderness, palpable spasm or pain on motion   Neurological - alert, oriented, normal speech, no focal findings or movement disorder noted   Musculoskeletal - no joint tenderness, deformity or swelling   Extremities - peripheral pulses normal, no pedal edema, no clubbing or cyanosis   Skin - normal coloration and turgor, no rashes, no suspicious skin lesions noted      Labs   No results found for: TSHREFLEX  TSH   Date Value Ref Range Status   07/18/2018 1.410 0.270 - 4.200 uIU/mL Final     Lab Results   Component Value Date     05/03/2022    K 4.6 05/03/2022     05/03/2022    CO2 26 05/03/2022    BUN 13 05/03/2022    CREATININE 0.79 05/03/2022    GLUCOSE 109 (H) 05/03/2022    CALCIUM 9.3 05/03/2022    PROT 7.6 05/03/2022    LABALBU 4.1 05/03/2022    BILITOT 0.6 05/03/2022    ALKPHOS 96 05/03/2022    AST 57 (H) 05/03/2022    ALT 84 (H) 05/03/2022    LABGLOM >60.0 05/03/2022    GFRAA >60.0 05/03/2022    GLOB 3.5 05/03/2022       Lab Results   Component Value Date    WBC 5.1 05/03/2022    HGB 10.0 (L) 05/03/2022    HCT 33.1 (L) 05/03/2022    MCV 65.6 (L) 05/03/2022     05/03/2022     Lab Results   Component Value Date    LABA1C 5.7 05/03/2022     No results found for: EAG        A/P: Bettina Nicholson 52 y.o. female presenting for     1.  Gastroesophageal reflux disease without esophagitis  Has not started the medication yet. Still having GERD. Will refill the medication   - omeprazole (PRILOSEC) 40 MG delayed release capsule; Take 1 capsule by mouth every morning (before breakfast)  Dispense: 90 capsule; Refill: 1    2. Class 3 severe obesity due to excess calories with body mass index (BMI) of 40.0 to 44.9 in adult, unspecified whether serious comorbidity present ([facility] 75.)  Start Topamax in the evening. Patient given potential side effects. Will have patient come back in 2 months for follow up. - phentermine (ADIPEX-P) 37.5 MG tablet; Take 1 tablet by mouth every morning (before breakfast) for 30 days. Dispense: 30 tablet; Refill: 0  - topiramate (TOPAMAX) 25 MG tablet; Take 1 tablet by mouth nightly  Dispense: 60 tablet; Refill: 3              Please note, this report has been partially produced using speech recognition software  and may cause  and /or contain errors related to that system including grammar, punctuation and spelling as well as words and phrases that may seem inappropriate. If there are questions or concerns please feel free to contact me to clarify.

## 2022-10-21 ENCOUNTER — OFFICE VISIT (OUTPATIENT)
Dept: FAMILY MEDICINE CLINIC | Age: 47
End: 2022-10-21
Payer: COMMERCIAL

## 2022-10-21 VITALS
OXYGEN SATURATION: 98 % | SYSTOLIC BLOOD PRESSURE: 126 MMHG | HEART RATE: 81 BPM | TEMPERATURE: 97.2 F | BODY MASS INDEX: 41.29 KG/M2 | WEIGHT: 233 LBS | HEIGHT: 63 IN | DIASTOLIC BLOOD PRESSURE: 78 MMHG

## 2022-10-21 DIAGNOSIS — E66.01 CLASS 3 SEVERE OBESITY DUE TO EXCESS CALORIES WITH BODY MASS INDEX (BMI) OF 40.0 TO 44.9 IN ADULT, UNSPECIFIED WHETHER SERIOUS COMORBIDITY PRESENT (HCC): ICD-10-CM

## 2022-10-21 DIAGNOSIS — K21.9 GASTROESOPHAGEAL REFLUX DISEASE WITHOUT ESOPHAGITIS: Primary | ICD-10-CM

## 2022-10-21 PROCEDURE — 1036F TOBACCO NON-USER: CPT | Performed by: FAMILY MEDICINE

## 2022-10-21 PROCEDURE — G8427 DOCREV CUR MEDS BY ELIG CLIN: HCPCS | Performed by: FAMILY MEDICINE

## 2022-10-21 PROCEDURE — G8417 CALC BMI ABV UP PARAM F/U: HCPCS | Performed by: FAMILY MEDICINE

## 2022-10-21 PROCEDURE — 99214 OFFICE O/P EST MOD 30 MIN: CPT | Performed by: FAMILY MEDICINE

## 2022-10-21 PROCEDURE — G8484 FLU IMMUNIZE NO ADMIN: HCPCS | Performed by: FAMILY MEDICINE

## 2022-10-21 RX ORDER — OMEPRAZOLE 40 MG/1
40 CAPSULE, DELAYED RELEASE ORAL
Qty: 90 CAPSULE | Refills: 1 | Status: SHIPPED | OUTPATIENT
Start: 2022-10-21

## 2022-10-21 RX ORDER — TOPIRAMATE 50 MG/1
50 TABLET, FILM COATED ORAL NIGHTLY
Qty: 90 TABLET | Refills: 1 | Status: SHIPPED | OUTPATIENT
Start: 2022-10-21

## 2022-10-21 NOTE — PROGRESS NOTES
Chief Complaint   Patient presents with    Follow-up    Weight Management     2 mo f/up. No issues/concerns. Health Maintenance     Refused flu vacc        HPI: Ning Lackey 52 y.o. female presenting for      GERD   Takes famotidine about 2 times a day   Does not help with symptoms   Unsure of hte triggers for her acid reflux   Has not tried any other medication. F/u   Unable to  the medication   GERD not controlled. Still taking the pepcid. Obesity   Has been gaining weight   Tried weight loss fads with no success   Would like medication to help lose weight     F/u   Lost some weight   Admits that her diet could be better   Compliant with the  medicaiton. Still has issues with eating at night. Current Outpatient Medications   Medication Sig Dispense Refill    omeprazole (PRILOSEC) 40 MG delayed release capsule Take 1 capsule by mouth every morning (before breakfast) 90 capsule 1    topiramate (TOPAMAX) 25 MG tablet Take 1 tablet by mouth nightly 60 tablet 3     No current facility-administered medications for this visit. ROS  CONSTITUTIONAL: The patient denies fevers, chills, sweats and body ache. Admits to weight  loss   HEENT: Denies headache, blurry vision, eye pain, tinnitus, vertigo,  sore throat, neck or thyroid masses. RESPIRATORY: Denies cough, sputum, hemoptysis. CARDIAC: Denies chest pain, pressure, palpitations, Denies lower extremity edema. GASTROINTESTINAL: Denies abdominal pain, constipation, diarrhea, bleeding in the stools,   GENITOURINARY: Denies dysuria, hematuria, nocturia or frequency, urinary incontinence. NEUROLOGIC: Denies headaches, dizziness, syncope, weakness  MUSCULOSKELETAL: denies changes in range of motion, joint pain, stiffness. ENDOCRINOLOGY: Denies heat or cold intolerance. HEMATOLOGY: Denies easy bleeding or blood transfusion,anemia  DERMATOLOGY: Denies changes in moles or pigmentation changes.   PSYCHIATRY: Denies depression, agitation or anxiety. No past medical history on file. Past Surgical History:   Procedure Laterality Date     SECTION      x4 emergency  c- section for the first one. [place] DENTAL SURGERY PROCEDURE Left 2018    REMOVAL OF TOOTH #18 WITH IRRIGARION OF ABSCESS performed by Myles Jeffries DDS at [facility] N/A 07/15/2018    DENTAL EXTRACTION performed by Myles Jeffries DDS at [facility]        Family History   Problem Relation Age of Onset    No Known Problems Father         Social History     Socioeconomic History    Marital status: Single     Spouse name: Not on file    Number of children: Not on file    Years of education: Not on file    Highest education level: Not on file   Occupational History    Not on file   Tobacco Use    Smoking status: Never    Smokeless tobacco: Never   Vaping Use    Vaping Use: Never used   Substance and Sexual Activity    Alcohol use: No    Drug use: No    Sexual activity: Not Currently   Other Topics Concern    Not on file   Social History Narrative    Born in [facility]     Has 4 children     Does not work .      Social Determinants of Health     Financial Resource Strain: Low Risk     Difficulty of Paying Living Expenses: Not hard at all   Food Insecurity: No Food Insecurity    Worried About Running Out of Food in the Last Year: Never true    Ran Out of Food in the Last Year: Never true   Transportation Needs: Not on file   Physical Activity: Not on file   Stress: Not on file   Social Connections: Not on file   Intimate Partner Violence: Not on file   Housing Stability: Not on file        /78   Pulse 81   Temp 97.2 °F (36.2 °C) (Temporal)   Ht 5' 3\" (1.6 m)   Wt 233 lb (105.7 kg)   SpO2 98%   BMI 41.27 kg/m²        Physical Exam:    General appearance - alert, well appearing, and in no distress, obesity   Mental Status - alert, oriented to person, place, and time  Eyes - pupils equal and reactive, extraocular eye movements intact   Ears - bilateral TM's and external ear canals normal   Nose - normal and patent, no erythema, discharge or polyps   Sinuses - Normal paranasal sinuses without tenderness   Throat - mucous membranes moist, pharynx normal without lesions   Neck - supple, no significant adenopathy   Thyroid - thyroid is normal in size without nodules or tenderness    Chest - clear to auscultation, no wheezes, rales or rhonchi, symmetric air entry   Heart - normal rate, regular rhythm, normal S1, S2, no murmurs, rubs, clicks or gallops  Abdomen - soft, nontender, nondistended, no masses or organomegaly   Back exam - full range of motion, no tenderness, palpable spasm or pain on motion   Neurological - alert, oriented, normal speech, no focal findings or movement disorder noted   Musculoskeletal - no joint tenderness, deformity or swelling   Extremities - peripheral pulses normal, no pedal edema, no clubbing or cyanosis   Skin - normal coloration and turgor, no rashes, no suspicious skin lesions noted      Labs   No results found for: TSHREFLEX  TSH   Date Value Ref Range Status   07/18/2018 1.410 0.270 - 4.200 uIU/mL Final     Lab Results   Component Value Date     05/03/2022    K 4.6 05/03/2022     05/03/2022    CO2 26 05/03/2022    BUN 13 05/03/2022    CREATININE 0.79 05/03/2022    GLUCOSE 109 (H) 05/03/2022    CALCIUM 9.3 05/03/2022    PROT 7.6 05/03/2022    LABALBU 4.1 05/03/2022    BILITOT 0.6 05/03/2022    ALKPHOS 96 05/03/2022    AST 57 (H) 05/03/2022    ALT 84 (H) 05/03/2022    LABGLOM >60.0 05/03/2022    GFRAA >60.0 05/03/2022    GLOB 3.5 05/03/2022       Lab Results   Component Value Date    WBC 5.1 05/03/2022    HGB 10.0 (L) 05/03/2022    HCT 33.1 (L) 05/03/2022    MCV 65.6 (L) 05/03/2022     05/03/2022     Lab Results   Component Value Date    LABA1C 5.7 05/03/2022     No results found for: EAG        A/P: Aundrea Nicholson 52 y.o. female presenting for     1.  Gastroesophageal reflux disease without esophagitis  Has improved with medication. Continue with omeprazole. 2. Class 3 severe obesity due to excess calories with body mass index (BMI) of 40.0 to 44.9 in adult, unspecified whether serious comorbidity present (HCC)  Increase topamax. Placed in a referral for the bariatric surgeon. Please note, this report has been partially produced using speech recognition software  and may cause  and /or contain errors related to that system including grammar, punctuation and spelling as well as words and phrases that may seem inappropriate. If there are questions or concerns please feel free to contact me to clarify.

## 2023-04-21 ENCOUNTER — OFFICE VISIT (OUTPATIENT)
Dept: FAMILY MEDICINE CLINIC | Age: 48
End: 2023-04-21
Payer: COMMERCIAL

## 2023-04-21 VITALS
SYSTOLIC BLOOD PRESSURE: 126 MMHG | WEIGHT: 233.6 LBS | TEMPERATURE: 97.3 F | HEIGHT: 63 IN | DIASTOLIC BLOOD PRESSURE: 86 MMHG | OXYGEN SATURATION: 97 % | HEART RATE: 89 BPM | BODY MASS INDEX: 41.39 KG/M2

## 2023-04-21 DIAGNOSIS — E66.01 CLASS 3 SEVERE OBESITY DUE TO EXCESS CALORIES WITH BODY MASS INDEX (BMI) OF 40.0 TO 44.9 IN ADULT, UNSPECIFIED WHETHER SERIOUS COMORBIDITY PRESENT (HCC): Primary | ICD-10-CM

## 2023-04-21 DIAGNOSIS — M65.30 TRIGGER FINGER, UNSPECIFIED FINGER, UNSPECIFIED LATERALITY: ICD-10-CM

## 2023-04-21 DIAGNOSIS — R22.33 NODULE OF FINGER OF BOTH HANDS: ICD-10-CM

## 2023-04-21 DIAGNOSIS — K21.9 GASTROESOPHAGEAL REFLUX DISEASE WITHOUT ESOPHAGITIS: ICD-10-CM

## 2023-04-21 PROCEDURE — 99214 OFFICE O/P EST MOD 30 MIN: CPT | Performed by: FAMILY MEDICINE

## 2023-04-21 PROCEDURE — G8417 CALC BMI ABV UP PARAM F/U: HCPCS | Performed by: FAMILY MEDICINE

## 2023-04-21 PROCEDURE — 4004F PT TOBACCO SCREEN RCVD TLK: CPT | Performed by: FAMILY MEDICINE

## 2023-04-21 PROCEDURE — G8427 DOCREV CUR MEDS BY ELIG CLIN: HCPCS | Performed by: FAMILY MEDICINE

## 2023-04-21 RX ORDER — OMEPRAZOLE 40 MG/1
40 CAPSULE, DELAYED RELEASE ORAL
Qty: 90 CAPSULE | Refills: 2 | Status: SHIPPED | OUTPATIENT
Start: 2023-04-21

## 2023-04-21 RX ORDER — PHENTERMINE HYDROCHLORIDE 37.5 MG/1
37.5 TABLET ORAL
Qty: 30 TABLET | Refills: 0 | Status: SHIPPED | OUTPATIENT
Start: 2023-04-21 | End: 2023-05-21

## 2023-04-21 SDOH — ECONOMIC STABILITY: HOUSING INSECURITY
IN THE LAST 12 MONTHS, WAS THERE A TIME WHEN YOU DID NOT HAVE A STEADY PLACE TO SLEEP OR SLEPT IN A SHELTER (INCLUDING NOW)?: NO

## 2023-04-21 SDOH — ECONOMIC STABILITY: FOOD INSECURITY: WITHIN THE PAST 12 MONTHS, THE FOOD YOU BOUGHT JUST DIDN'T LAST AND YOU DIDN'T HAVE MONEY TO GET MORE.: NEVER TRUE

## 2023-04-21 SDOH — ECONOMIC STABILITY: FOOD INSECURITY: WITHIN THE PAST 12 MONTHS, YOU WORRIED THAT YOUR FOOD WOULD RUN OUT BEFORE YOU GOT MONEY TO BUY MORE.: NEVER TRUE

## 2023-04-21 SDOH — ECONOMIC STABILITY: INCOME INSECURITY: HOW HARD IS IT FOR YOU TO PAY FOR THE VERY BASICS LIKE FOOD, HOUSING, MEDICAL CARE, AND HEATING?: NOT HARD AT ALL

## 2023-04-21 ASSESSMENT — PATIENT HEALTH QUESTIONNAIRE - PHQ9
2. FEELING DOWN, DEPRESSED OR HOPELESS: 0
SUM OF ALL RESPONSES TO PHQ QUESTIONS 1-9: 0
SUM OF ALL RESPONSES TO PHQ QUESTIONS 1-9: 0
SUM OF ALL RESPONSES TO PHQ9 QUESTIONS 1 & 2: 0
SUM OF ALL RESPONSES TO PHQ QUESTIONS 1-9: 0
SUM OF ALL RESPONSES TO PHQ QUESTIONS 1-9: 0
1. LITTLE INTEREST OR PLEASURE IN DOING THINGS: 0

## 2023-04-21 NOTE — PROGRESS NOTES
syncope, weakness  MUSCULOSKELETAL: denies changes in range of motion, joint pain, stiffness. ENDOCRINOLOGY: Denies heat or cold intolerance. HEMATOLOGY: Denies easy bleeding or blood transfusion,anemia  DERMATOLOGY: Denies changes in moles or pigmentation changes. PSYCHIATRY: Denies depression, agitation or anxiety. No past medical history on file. Past Surgical History:   Procedure Laterality Date     SECTION      x4 emergency  c- section for the first one. [place] DENTAL SURGERY PROCEDURE Left 2018    REMOVAL OF TOOTH #18 WITH IRRIGARION OF ABSCESS performed by Whit Headley DDS at [facility] N/A 07/15/2018    DENTAL EXTRACTION performed by Whit Headley DDS at [place]        Family History   Problem Relation Age of Onset    No Known Problems Father         Social History     Socioeconomic History    Marital status: Single     Spouse name: Not on file    Number of children: Not on file    Years of education: Not on file    Highest education level: Not on file   Occupational History    Not on file   Tobacco Use    Smoking status: Never    Smokeless tobacco: Never   Vaping Use    Vaping Use: Never used   Substance and Sexual Activity    Alcohol use: No    Drug use: No    Sexual activity: Not Currently   Other Topics Concern    Not on file   Social History Narrative    Born in [facility]     Has 4 children     Does not work .      Social Determinants of Health     Financial Resource Strain: Not on file   Food Insecurity: Not on file   Transportation Needs: Not on file   Physical Activity: Not on file   Stress: Not on file   Social Connections: Not on file   Intimate Partner Violence: Not on file   Housing Stability: Not on file        Ht 5' 3\" (1.6 m)   BMI 41.27 kg/m²        Physical Exam:    General appearance - alert, well appearing, and in no distress, obesity   Mental Status - alert, oriented to person, place, and time  Eyes - pupils equal and

## 2023-05-16 ENCOUNTER — OFFICE VISIT (OUTPATIENT)
Dept: ORTHOPEDIC SURGERY | Age: 48
End: 2023-05-16
Payer: COMMERCIAL

## 2023-05-16 VITALS
WEIGHT: 233 LBS | TEMPERATURE: 97.6 F | HEIGHT: 63 IN | BODY MASS INDEX: 41.29 KG/M2 | HEART RATE: 88 BPM | OXYGEN SATURATION: 98 %

## 2023-05-16 DIAGNOSIS — R22.32 NODULE OF LEFT PALM: ICD-10-CM

## 2023-05-16 DIAGNOSIS — R22.31 NODULE OF FINGER OF RIGHT HAND: Primary | ICD-10-CM

## 2023-05-16 PROCEDURE — G8427 DOCREV CUR MEDS BY ELIG CLIN: HCPCS | Performed by: ORTHOPAEDIC SURGERY

## 2023-05-16 PROCEDURE — 99243 OFF/OP CNSLTJ NEW/EST LOW 30: CPT | Performed by: ORTHOPAEDIC SURGERY

## 2023-05-16 PROCEDURE — G8417 CALC BMI ABV UP PARAM F/U: HCPCS | Performed by: ORTHOPAEDIC SURGERY

## 2023-05-16 NOTE — PROGRESS NOTES
ADDITIONAL VIEWS. Assessment:      Diagnosis Orders   1. Nodule of finger of right hand        2. Nodule of left palm                Plan:      I am ordering an MRI of both hands with and without contrast.  After those are been performed I will see her back and we will talk about surgical intervention.         Steve Soto DO  Orthopedic and Spine Surgeon  [facility]  [phone]

## 2023-05-26 ENCOUNTER — OFFICE VISIT (OUTPATIENT)
Dept: FAMILY MEDICINE CLINIC | Age: 48
End: 2023-05-26
Payer: COMMERCIAL

## 2023-05-26 VITALS
TEMPERATURE: 96.9 F | WEIGHT: 232 LBS | BODY MASS INDEX: 41.11 KG/M2 | HEART RATE: 87 BPM | HEIGHT: 63 IN | DIASTOLIC BLOOD PRESSURE: 82 MMHG | SYSTOLIC BLOOD PRESSURE: 126 MMHG | OXYGEN SATURATION: 98 %

## 2023-05-26 DIAGNOSIS — J30.9 ALLERGIC RHINITIS, UNSPECIFIED SEASONALITY, UNSPECIFIED TRIGGER: Primary | ICD-10-CM

## 2023-05-26 DIAGNOSIS — R73.03 PREDIABETES: ICD-10-CM

## 2023-05-26 DIAGNOSIS — E66.01 CLASS 3 SEVERE OBESITY DUE TO EXCESS CALORIES WITH BODY MASS INDEX (BMI) OF 40.0 TO 44.9 IN ADULT, UNSPECIFIED WHETHER SERIOUS COMORBIDITY PRESENT (HCC): ICD-10-CM

## 2023-05-26 LAB — HBA1C MFR BLD: 5.9 % (ref 4.8–5.9)

## 2023-05-26 PROCEDURE — 4004F PT TOBACCO SCREEN RCVD TLK: CPT | Performed by: FAMILY MEDICINE

## 2023-05-26 PROCEDURE — 99213 OFFICE O/P EST LOW 20 MIN: CPT | Performed by: FAMILY MEDICINE

## 2023-05-26 PROCEDURE — G8417 CALC BMI ABV UP PARAM F/U: HCPCS | Performed by: FAMILY MEDICINE

## 2023-05-26 PROCEDURE — G8427 DOCREV CUR MEDS BY ELIG CLIN: HCPCS | Performed by: FAMILY MEDICINE

## 2023-05-26 RX ORDER — AMMONIUM LACTATE 12 G/100G
LOTION TOPICAL
Qty: 222 ML | Refills: 0 | Status: SHIPPED | OUTPATIENT
Start: 2023-05-26

## 2023-05-26 RX ORDER — PHENTERMINE HYDROCHLORIDE 37.5 MG/1
37.5 TABLET ORAL
Qty: 30 TABLET | Refills: 0 | Status: SHIPPED | OUTPATIENT
Start: 2023-05-26 | End: 2023-06-25

## 2023-05-26 RX ORDER — PREDNISONE 10 MG/1
10 TABLET ORAL DAILY
Qty: 40 TABLET | Refills: 0 | Status: CANCELLED | OUTPATIENT
Start: 2023-05-26 | End: 2023-06-05

## 2023-05-26 NOTE — PROGRESS NOTES
Chief Complaint   Patient presents with    1 Month Follow-Up    Weight Management     Patient reports adipex is barely working for her. One pound difference since last month    Rash     Patient developing a rash when she touches plants/bushes. HPI: Vishnu Brown 50 y.o. female presenting for      Obesity   Has been gaining weight   Tried weight loss fads with no success   Would like medication to help lose weight     F/u   Lost some weight   Admits that her diet could be better   Compliant with the  medicaiton. Still has issues with eating at night. F/u   Weight hte same   Patient rpeorts that she has not tried the topamax (forgets to take)   Would like to try the adipex again. F/u   Not working as well   Has made changes to her diet. Takes the adipex 1 hour after eating. Allergic dermatitis  Patient reports that she would get allergy shots in the past   Patient reports that she was brushed against against a tree (not poison ivy)   Patient just stopped the steroids . Would like referral to allergist.       Current Outpatient Medications   Medication Sig Dispense Refill    phentermine (ADIPEX-P) 37.5 MG tablet Take 1 tablet by mouth every morning (before breakfast) for 30 days. Max Daily Amount: 37.5 mg 30 tablet 0    ammonium lactate (LAC-HYDRIN) 12 % lotion Apply topically as needed. 222 mL 0    omeprazole (PRILOSEC) 40 MG delayed release capsule Take 1 capsule by mouth every morning (before breakfast) 90 capsule 2     No current facility-administered medications for this visit. ROS  CONSTITUTIONAL: The patient denies fevers, chills, sweats and body ache. Admits to no changes in weight. HEENT: Denies headache, blurry vision, eye pain, tinnitus, vertigo,  sore throat, neck or thyroid masses. RESPIRATORY: Denies cough, sputum, hemoptysis. CARDIAC: Denies chest pain, pressure, palpitations, Denies lower extremity edema.   GASTROINTESTINAL: Denies abdominal pain, constipation,

## 2023-06-05 ENCOUNTER — HOSPITAL ENCOUNTER (OUTPATIENT)
Dept: MRI IMAGING | Age: 48
Discharge: HOME | End: 2023-06-07
Payer: COMMERCIAL

## 2023-06-05 DIAGNOSIS — R22.32 NODULE OF LEFT PALM: ICD-10-CM

## 2023-06-05 DIAGNOSIS — R22.31 NODULE OF FINGER OF RIGHT HAND: ICD-10-CM

## 2023-06-05 PROCEDURE — 73220 MRI UPPR EXTREMITY W/O&W/DYE: CPT

## 2023-06-05 PROCEDURE — A9579 GAD-BASE MR CONTRAST NOS,1ML: HCPCS | Performed by: ORTHOPAEDIC SURGERY

## 2023-06-05 PROCEDURE — 6360000004 HC RX CONTRAST MEDICATION: Performed by: ORTHOPAEDIC SURGERY

## 2023-06-05 RX ADMIN — GADOTERIDOL 20 ML: 279.3 INJECTION, SOLUTION INTRAVENOUS at 13:32

## 2023-06-09 ENCOUNTER — TELEPHONE (OUTPATIENT)
Dept: ORTHOPEDIC SURGERY | Age: 48
End: 2023-06-09

## 2023-06-23 ENCOUNTER — OFFICE VISIT (OUTPATIENT)
Dept: FAMILY MEDICINE CLINIC | Age: 48
End: 2023-06-23
Payer: COMMERCIAL

## 2023-06-23 VITALS
BODY MASS INDEX: 40.22 KG/M2 | WEIGHT: 227 LBS | DIASTOLIC BLOOD PRESSURE: 86 MMHG | SYSTOLIC BLOOD PRESSURE: 126 MMHG | TEMPERATURE: 96.9 F | OXYGEN SATURATION: 97 % | HEART RATE: 94 BPM | HEIGHT: 63 IN

## 2023-06-23 DIAGNOSIS — E66.01 CLASS 3 SEVERE OBESITY DUE TO EXCESS CALORIES WITH BODY MASS INDEX (BMI) OF 40.0 TO 44.9 IN ADULT, UNSPECIFIED WHETHER SERIOUS COMORBIDITY PRESENT (HCC): ICD-10-CM

## 2023-06-23 DIAGNOSIS — R73.03 PREDIABETES: Primary | ICD-10-CM

## 2023-06-23 PROCEDURE — 4004F PT TOBACCO SCREEN RCVD TLK: CPT | Performed by: FAMILY MEDICINE

## 2023-06-23 PROCEDURE — G8417 CALC BMI ABV UP PARAM F/U: HCPCS | Performed by: FAMILY MEDICINE

## 2023-06-23 PROCEDURE — 99213 OFFICE O/P EST LOW 20 MIN: CPT | Performed by: FAMILY MEDICINE

## 2023-06-23 PROCEDURE — G8427 DOCREV CUR MEDS BY ELIG CLIN: HCPCS | Performed by: FAMILY MEDICINE

## 2023-06-23 RX ORDER — PHENTERMINE HYDROCHLORIDE 37.5 MG/1
37.5 TABLET ORAL
Qty: 30 TABLET | Refills: 0 | Status: SHIPPED | OUTPATIENT
Start: 2023-06-23 | End: 2023-07-23

## 2023-06-23 NOTE — PROGRESS NOTES
Chief Complaint   Patient presents with    1 Month Follow-Up    Weight Management     Patient doing alright, she felt like she was not losing weight but she dropped a few from last month        HPI: Martin Wiseman 50 y.o. female presenting for      Obesity   Has been gaining weight   Tried weight loss fads with no success   Would like medication to help lose weight     F/u   Lost some weight   Admits that her diet could be better   Compliant with the  medicaiton. Still has issues with eating at night. F/u   Weight hte same   Patient rpeorts that she has not tried the topamax (forgets to take)   Would like to try the adipex again. F/u   Not working as well   Has made changes to her diet. Takes the adipex 1 hour after eating. F/u   Lost some weight   Would like to continue the adipex   Would like to try other medications. Current Outpatient Medications   Medication Sig Dispense Refill    phentermine (ADIPEX-P) 37.5 MG tablet Take 1 tablet by mouth every morning (before breakfast) for 30 days. Max Daily Amount: 37.5 mg 30 tablet 0    ammonium lactate (LAC-HYDRIN) 12 % lotion Apply topically as needed. 222 mL 0    omeprazole (PRILOSEC) 40 MG delayed release capsule Take 1 capsule by mouth every morning (before breakfast) 90 capsule 2     No current facility-administered medications for this visit. ROS  CONSTITUTIONAL: The patient denies fevers, chills, sweats and body ache. Admits to weight loss   HEENT: Denies headache, blurry vision, eye pain, tinnitus, vertigo,  sore throat, neck or thyroid masses. RESPIRATORY: Denies cough, sputum, hemoptysis. CARDIAC: Denies chest pain, pressure, palpitations, Denies lower extremity edema. GASTROINTESTINAL: Denies abdominal pain, constipation, diarrhea, bleeding in the stools,   GENITOURINARY: Denies dysuria, hematuria, nocturia or frequency, urinary incontinence.   NEUROLOGIC: Denies headaches, dizziness, syncope, weakness  MUSCULOSKELETAL:

## 2023-07-21 ENCOUNTER — TELEMEDICINE (OUTPATIENT)
Dept: FAMILY MEDICINE CLINIC | Age: 48
End: 2023-07-21
Payer: COMMERCIAL

## 2023-07-21 DIAGNOSIS — K21.9 GASTROESOPHAGEAL REFLUX DISEASE WITHOUT ESOPHAGITIS: ICD-10-CM

## 2023-07-21 DIAGNOSIS — E66.01 CLASS 3 SEVERE OBESITY DUE TO EXCESS CALORIES WITH BODY MASS INDEX (BMI) OF 40.0 TO 44.9 IN ADULT, UNSPECIFIED WHETHER SERIOUS COMORBIDITY PRESENT (HCC): Primary | ICD-10-CM

## 2023-07-21 DIAGNOSIS — R73.03 PREDIABETES: ICD-10-CM

## 2023-07-21 PROCEDURE — G8427 DOCREV CUR MEDS BY ELIG CLIN: HCPCS | Performed by: FAMILY MEDICINE

## 2023-07-21 PROCEDURE — 4004F PT TOBACCO SCREEN RCVD TLK: CPT | Performed by: FAMILY MEDICINE

## 2023-07-21 PROCEDURE — 99213 OFFICE O/P EST LOW 20 MIN: CPT | Performed by: FAMILY MEDICINE

## 2023-07-21 PROCEDURE — G8417 CALC BMI ABV UP PARAM F/U: HCPCS | Performed by: FAMILY MEDICINE

## 2023-07-21 RX ORDER — OMEPRAZOLE 40 MG/1
40 CAPSULE, DELAYED RELEASE ORAL
Qty: 90 CAPSULE | Refills: 2 | Status: SHIPPED | OUTPATIENT
Start: 2023-07-21

## 2023-07-21 NOTE — PROGRESS NOTES
2023    TELEHEALTH EVALUATION -- Audio/Visual    HPI:    Lisette Jones (:  1975) has requested an audio/video evaluation for the following concern(s):    HPI: Lisette Jones 50 y.o. female presenting for        Obesity   Has been gaining weight   Tried weight loss fads with no success   Would like medication to help lose weight      F/u   Lost some weight   Admits that her diet could be better   Compliant with the  medicaiton. Still has issues with eating at night. F/u   Weight hte same   Patient rpeorts that she has not tried the topamax (forgets to take)   Would like to try the adipex again. F/u   Not working as well   Has made changes to her diet. Takes the adipex 1 hour after eating. F/u   Did not  wegovy unsure if it was covered   Does not want to continue adipex due to no results   Hesitant about bariatric surgery at this time. GERD   Stable needs   Refills. Current Facility-Administered Medications          Current Outpatient Medications   Medication Sig Dispense Refill    omeprazole (PRILOSEC) 40 MG delayed release capsule Take 1 capsule by mouth every morning (before breakfast) 90 capsule 2    ammonium lactate (LAC-HYDRIN) 12 % lotion Apply topically as needed. 222 mL 0      No current facility-administered medications for this visit. ROS  CONSTITUTIONAL: The patient denies fevers, chills, sweats and body ache. Admits to no weight change  HEENT: Denies headache, blurry vision, eye pain, tinnitus, vertigo,  sore throat, neck or thyroid masses. RESPIRATORY: Denies cough, sputum, hemoptysis. CARDIAC: Denies chest pain, pressure, palpitations, Denies lower extremity edema. GASTROINTESTINAL: Denies abdominal pain, constipation, diarrhea, bleeding in the stools,   GENITOURINARY: Denies dysuria, hematuria, nocturia or frequency, urinary incontinence.   NEUROLOGIC: Denies headaches, dizziness, syncope, weakness  MUSCULOSKELETAL: denies

## 2023-08-10 ENCOUNTER — TELEPHONE (OUTPATIENT)
Dept: FAMILY MEDICINE CLINIC | Age: 48
End: 2023-08-10

## 2023-08-10 NOTE — TELEPHONE ENCOUNTER
Re; Summer crisis program    Needs letter stating that she is in need of an air conditioner due to her:    Hx  Asthma    Is a letter able to be written - pt last seen by Dr. Jennifer Warner  7/21/23    and is ernestina'd to see her again on Dec 19 at 9;15  Or does she need to see another PCP??

## 2024-02-01 ENCOUNTER — OFFICE VISIT (OUTPATIENT)
Dept: FAMILY MEDICINE CLINIC | Age: 49
End: 2024-02-01
Payer: COMMERCIAL

## 2024-02-01 VITALS
TEMPERATURE: 97.2 F | HEART RATE: 90 BPM | WEIGHT: 235 LBS | BODY MASS INDEX: 41.63 KG/M2 | OXYGEN SATURATION: 99 % | SYSTOLIC BLOOD PRESSURE: 126 MMHG | DIASTOLIC BLOOD PRESSURE: 84 MMHG

## 2024-02-01 DIAGNOSIS — E66.01 CLASS 3 SEVERE OBESITY DUE TO EXCESS CALORIES WITH BODY MASS INDEX (BMI) OF 40.0 TO 44.9 IN ADULT, UNSPECIFIED WHETHER SERIOUS COMORBIDITY PRESENT (HCC): ICD-10-CM

## 2024-02-01 PROCEDURE — G8427 DOCREV CUR MEDS BY ELIG CLIN: HCPCS | Performed by: FAMILY MEDICINE

## 2024-02-01 PROCEDURE — G8484 FLU IMMUNIZE NO ADMIN: HCPCS | Performed by: FAMILY MEDICINE

## 2024-02-01 PROCEDURE — 4004F PT TOBACCO SCREEN RCVD TLK: CPT | Performed by: FAMILY MEDICINE

## 2024-02-01 PROCEDURE — G8417 CALC BMI ABV UP PARAM F/U: HCPCS | Performed by: FAMILY MEDICINE

## 2024-02-01 PROCEDURE — 99213 OFFICE O/P EST LOW 20 MIN: CPT | Performed by: FAMILY MEDICINE

## 2024-02-01 RX ORDER — PHENTERMINE HYDROCHLORIDE 37.5 MG/1
37.5 TABLET ORAL
Qty: 30 TABLET | Refills: 0 | Status: SHIPPED | OUTPATIENT
Start: 2024-02-01 | End: 2024-03-02

## 2024-02-01 ASSESSMENT — PATIENT HEALTH QUESTIONNAIRE - PHQ9
2. FEELING DOWN, DEPRESSED OR HOPELESS: 0
1. LITTLE INTEREST OR PLEASURE IN DOING THINGS: 0
SUM OF ALL RESPONSES TO PHQ QUESTIONS 1-9: 0
SUM OF ALL RESPONSES TO PHQ QUESTIONS 1-9: 0
SUM OF ALL RESPONSES TO PHQ9 QUESTIONS 1 & 2: 0
SUM OF ALL RESPONSES TO PHQ QUESTIONS 1-9: 0
SUM OF ALL RESPONSES TO PHQ QUESTIONS 1-9: 0

## 2024-02-01 NOTE — PROGRESS NOTES
Chief Complaint   Patient presents with    1 Month Follow-Up    Weight Management     Lost 6 pounds        HPI: Rashad Nicholson 48 y.o. female presenting for      Obesity   Has been gaining weight   Tried weight loss fads with no success   Would like medication to help lose weight     F/u   Not working as well   Has made changes to her diet.   Takes the adipex 1 hour after eating.     F/u   Lost some weight   Would like to continue the adipex   Would like to try other medications.   Has decreased snacks.          Current Outpatient Medications   Medication Sig Dispense Refill    albuterol (PROVENTIL) (2.5 MG/3ML) 0.083% nebulizer solution INHALE THE CONTENTS OF 1 VIAL (3 ML) VIA NEBULIZER EVERY 4 HOURS AS NEEDED      omeprazole (PRILOSEC) 40 MG delayed release capsule Take 1 capsule by mouth every morning (before breakfast) 90 capsule 2    Semaglutide,0.25 or 0.5MG/DOS, 2 MG/1.5ML SOPN Inject 0.25 mg into the skin once a week 3 mL 1     No current facility-administered medications for this visit.        ROS  CONSTITUTIONAL: The patient denies fevers, chills, sweats and body ache. Admits to weight loss   HEENT: Denies headache, blurry vision, eye pain, tinnitus, vertigo,  sore throat, neck or thyroid masses. Admits to ear pain.   RESPIRATORY: Denies cough, sputum, hemoptysis.  CARDIAC: Denies chest pain, pressure, palpitations, Denies lower extremity edema.  GASTROINTESTINAL: Denies abdominal pain, constipation, diarrhea, bleeding in the stools,   GENITOURINARY: Denies dysuria, hematuria, nocturia or frequency, urinary incontinence.  NEUROLOGIC: Denies headaches, dizziness, syncope, weakness  MUSCULOSKELETAL: denies changes in range of motion, joint pain, stiffness.  ENDOCRINOLOGY: Denies heat or cold intolerance.   HEMATOLOGY: Denies easy bleeding or blood transfusion,anemia  DERMATOLOGY: admits to rash on her upper extremities   PSYCHIATRY: Denies depression, agitation or anxiety.    No past medical history on

## 2024-02-29 ENCOUNTER — OFFICE VISIT (OUTPATIENT)
Dept: FAMILY MEDICINE CLINIC | Age: 49
End: 2024-02-29
Payer: COMMERCIAL

## 2024-02-29 VITALS
WEIGHT: 238 LBS | DIASTOLIC BLOOD PRESSURE: 84 MMHG | HEIGHT: 63 IN | HEART RATE: 80 BPM | OXYGEN SATURATION: 100 % | SYSTOLIC BLOOD PRESSURE: 124 MMHG | BODY MASS INDEX: 42.17 KG/M2 | TEMPERATURE: 97.3 F

## 2024-02-29 DIAGNOSIS — E66.01 CLASS 3 SEVERE OBESITY DUE TO EXCESS CALORIES WITH BODY MASS INDEX (BMI) OF 40.0 TO 44.9 IN ADULT, UNSPECIFIED WHETHER SERIOUS COMORBIDITY PRESENT (HCC): ICD-10-CM

## 2024-02-29 PROCEDURE — G8417 CALC BMI ABV UP PARAM F/U: HCPCS | Performed by: FAMILY MEDICINE

## 2024-02-29 PROCEDURE — G8427 DOCREV CUR MEDS BY ELIG CLIN: HCPCS | Performed by: FAMILY MEDICINE

## 2024-02-29 PROCEDURE — G8484 FLU IMMUNIZE NO ADMIN: HCPCS | Performed by: FAMILY MEDICINE

## 2024-02-29 PROCEDURE — 99213 OFFICE O/P EST LOW 20 MIN: CPT | Performed by: FAMILY MEDICINE

## 2024-02-29 PROCEDURE — 4004F PT TOBACCO SCREEN RCVD TLK: CPT | Performed by: FAMILY MEDICINE

## 2024-02-29 RX ORDER — TOPIRAMATE 50 MG/1
TABLET, FILM COATED ORAL
Qty: 30 TABLET | Refills: 2 | Status: SHIPPED | OUTPATIENT
Start: 2024-02-29

## 2024-02-29 RX ORDER — PHENTERMINE HYDROCHLORIDE 37.5 MG/1
37.5 TABLET ORAL
Qty: 30 TABLET | Refills: 0 | Status: SHIPPED | OUTPATIENT
Start: 2024-02-29 | End: 2024-03-30

## 2024-02-29 NOTE — PROGRESS NOTES
Chief Complaint   Patient presents with    1 Month Follow-Up    Weight Management     Patient is doing good, she did increase in weight. Discuss increase dose        HPI: Rashad Nicholson 48 y.o. female presenting for      Obesity   Has been gaining weight   Tried weight loss fads with no success   Would like medication to help lose weight     F/u   Not working as well   Has made changes to her diet.   Takes the adipex 1 hour after eating.     F/u   Gained weight   Admits to night time eating   Would like to continue adipex.          Current Outpatient Medications   Medication Sig Dispense Refill    phentermine (ADIPEX-P) 37.5 MG tablet Take 1 tablet by mouth every morning (before breakfast) for 30 days. Max Daily Amount: 37.5 mg 30 tablet 0    topiramate (TOPAMAX) 50 MG tablet Take 0.5 tab by mouth nightly for 1 week. Then afterwards take 1 tab by mouth nightly 30 tablet 2    albuterol (PROVENTIL) (2.5 MG/3ML) 0.083% nebulizer solution INHALE THE CONTENTS OF 1 VIAL (3 ML) VIA NEBULIZER EVERY 4 HOURS AS NEEDED      omeprazole (PRILOSEC) 40 MG delayed release capsule Take 1 capsule by mouth every morning (before breakfast) 90 capsule 2     No current facility-administered medications for this visit.        ROS  CONSTITUTIONAL: The patient denies fevers, chills, sweats and body ache. Admits to weight gain  HEENT: Denies headache, blurry vision, eye pain, tinnitus, vertigo,  sore throat, neck or thyroid masses. Admits to ear pain.   RESPIRATORY: Denies cough, sputum, hemoptysis.  CARDIAC: Denies chest pain, pressure, palpitations, Denies lower extremity edema.  GASTROINTESTINAL: Denies abdominal pain, constipation, diarrhea, bleeding in the stools,   GENITOURINARY: Denies dysuria, hematuria, nocturia or frequency, urinary incontinence.  NEUROLOGIC: Denies headaches, dizziness, syncope, weakness  MUSCULOSKELETAL: denies changes in range of motion, joint pain, stiffness.  ENDOCRINOLOGY: Denies heat or cold intolerance.

## 2024-03-01 ENCOUNTER — TELEPHONE (OUTPATIENT)
Dept: FAMILY MEDICINE CLINIC | Age: 49
End: 2024-03-01

## 2024-03-01 DIAGNOSIS — R73.03 PREDIABETES: Primary | ICD-10-CM

## 2024-03-01 DIAGNOSIS — E66.01 CLASS 3 SEVERE OBESITY DUE TO EXCESS CALORIES WITH BODY MASS INDEX (BMI) OF 40.0 TO 44.9 IN ADULT, UNSPECIFIED WHETHER SERIOUS COMORBIDITY PRESENT (HCC): ICD-10-CM

## 2024-03-01 RX ORDER — TIRZEPATIDE 2.5 MG/.5ML
2.5 INJECTION, SOLUTION SUBCUTANEOUS WEEKLY
Qty: 2 ML | Refills: 5 | Status: SHIPPED | OUTPATIENT
Start: 2024-03-01

## 2024-03-01 NOTE — TELEPHONE ENCOUNTER
----- Message from Michaela Blandon MD sent at 2/29/2024  4:19 PM EST -----  Please let patient know the medication that May help with weight loss is called zepbound. It is a med that is injected. Please ask her if she would like me to order this ?     Thanks

## 2024-03-28 ENCOUNTER — OFFICE VISIT (OUTPATIENT)
Dept: FAMILY MEDICINE CLINIC | Age: 49
End: 2024-03-28
Payer: COMMERCIAL

## 2024-03-28 VITALS
WEIGHT: 236 LBS | DIASTOLIC BLOOD PRESSURE: 72 MMHG | OXYGEN SATURATION: 98 % | BODY MASS INDEX: 41.82 KG/M2 | TEMPERATURE: 97.3 F | HEIGHT: 63 IN | HEART RATE: 97 BPM | SYSTOLIC BLOOD PRESSURE: 128 MMHG

## 2024-03-28 DIAGNOSIS — E66.01 CLASS 3 SEVERE OBESITY DUE TO EXCESS CALORIES WITH BODY MASS INDEX (BMI) OF 40.0 TO 44.9 IN ADULT, UNSPECIFIED WHETHER SERIOUS COMORBIDITY PRESENT (HCC): ICD-10-CM

## 2024-03-28 PROCEDURE — 4004F PT TOBACCO SCREEN RCVD TLK: CPT | Performed by: FAMILY MEDICINE

## 2024-03-28 PROCEDURE — G8417 CALC BMI ABV UP PARAM F/U: HCPCS | Performed by: FAMILY MEDICINE

## 2024-03-28 PROCEDURE — G8484 FLU IMMUNIZE NO ADMIN: HCPCS | Performed by: FAMILY MEDICINE

## 2024-03-28 PROCEDURE — 99213 OFFICE O/P EST LOW 20 MIN: CPT | Performed by: FAMILY MEDICINE

## 2024-03-28 PROCEDURE — G8427 DOCREV CUR MEDS BY ELIG CLIN: HCPCS | Performed by: FAMILY MEDICINE

## 2024-03-28 RX ORDER — PHENTERMINE HYDROCHLORIDE 37.5 MG/1
37.5 TABLET ORAL
Qty: 30 TABLET | Refills: 1 | Status: SHIPPED | OUTPATIENT
Start: 2024-03-28 | End: 2024-05-27

## 2024-03-28 RX ORDER — BENZONATATE 100 MG/1
100 CAPSULE ORAL 3 TIMES DAILY PRN
COMMUNITY
Start: 2024-03-06

## 2024-03-28 RX ORDER — FLUTICASONE PROPIONATE 50 MCG
1 SPRAY, SUSPENSION (ML) NASAL NIGHTLY
COMMUNITY
Start: 2024-03-06 | End: 2024-04-03

## 2024-03-28 RX ORDER — TOPIRAMATE 50 MG/1
100 TABLET, FILM COATED ORAL NIGHTLY
Qty: 180 TABLET | Refills: 1 | Status: SHIPPED | OUTPATIENT
Start: 2024-03-28

## 2024-03-28 NOTE — PROGRESS NOTES
Chief Complaint   Patient presents with    1 Month Follow-Up    Weight Management     Pt doing well, lost 2 pounds         HPI: Rashad Nicholson 49 y.o. female presenting for      Obesity   Has been gaining weight   Tried weight loss fads with no success   Would like medication to help lose weight     F/u   Not working as well   Has made changes to her diet.   Takes the adipex 1 hour after eating.     F/u   Gained weight   Admits to night time eating   Would like to continue adipex.   Topamax did help would like to increase the dose.          Current Outpatient Medications   Medication Sig Dispense Refill    benzonatate (TESSALON) 100 MG capsule Take 1 capsule by mouth 3 times daily as needed for Cough      fluticasone (FLONASE) 50 MCG/ACT nasal spray 1 spray by NOT APPLICABLE route nightly      Tirzepatide-Weight Management (ZEPBOUND) 2.5 MG/0.5ML SOAJ Inject 2.5 mg into the skin once a week 2 mL 5    phentermine (ADIPEX-P) 37.5 MG tablet Take 1 tablet by mouth every morning (before breakfast) for 30 days. Max Daily Amount: 37.5 mg 30 tablet 0    topiramate (TOPAMAX) 50 MG tablet Take 0.5 tab by mouth nightly for 1 week. Then afterwards take 1 tab by mouth nightly 30 tablet 2    albuterol (PROVENTIL) (2.5 MG/3ML) 0.083% nebulizer solution INHALE THE CONTENTS OF 1 VIAL (3 ML) VIA NEBULIZER EVERY 4 HOURS AS NEEDED      omeprazole (PRILOSEC) 40 MG delayed release capsule Take 1 capsule by mouth every morning (before breakfast) 90 capsule 2     No current facility-administered medications for this visit.        ROS  CONSTITUTIONAL: The patient denies fevers, chills, sweats and body ache. Admits to weight gain  HEENT: Denies headache, blurry vision, eye pain, tinnitus, vertigo,  sore throat, neck or thyroid masses. Admits to ear pain.   RESPIRATORY: Denies cough, sputum, hemoptysis.  CARDIAC: Denies chest pain, pressure, palpitations, Denies lower extremity edema.  GASTROINTESTINAL: Denies abdominal pain, constipation,

## 2024-05-09 DIAGNOSIS — K21.9 GASTROESOPHAGEAL REFLUX DISEASE WITHOUT ESOPHAGITIS: ICD-10-CM

## 2024-05-09 DIAGNOSIS — R73.03 PREDIABETES: ICD-10-CM

## 2024-05-09 DIAGNOSIS — E66.01 CLASS 3 SEVERE OBESITY DUE TO EXCESS CALORIES WITH BODY MASS INDEX (BMI) OF 40.0 TO 44.9 IN ADULT, UNSPECIFIED WHETHER SERIOUS COMORBIDITY PRESENT (HCC): ICD-10-CM

## 2024-05-10 RX ORDER — PHENTERMINE HYDROCHLORIDE 37.5 MG/1
37.5 TABLET ORAL
Qty: 30 TABLET | Refills: 1 | Status: SHIPPED | OUTPATIENT
Start: 2024-05-10 | End: 2024-07-09

## 2024-05-10 RX ORDER — TIRZEPATIDE 2.5 MG/.5ML
2.5 INJECTION, SOLUTION SUBCUTANEOUS WEEKLY
Qty: 2 ML | Refills: 5 | Status: SHIPPED | OUTPATIENT
Start: 2024-05-10

## 2024-05-10 RX ORDER — OMEPRAZOLE 40 MG/1
40 CAPSULE, DELAYED RELEASE ORAL
Qty: 90 CAPSULE | Refills: 2 | Status: SHIPPED | OUTPATIENT
Start: 2024-05-10

## 2024-05-30 DIAGNOSIS — E66.01 CLASS 3 SEVERE OBESITY DUE TO EXCESS CALORIES WITH BODY MASS INDEX (BMI) OF 40.0 TO 44.9 IN ADULT, UNSPECIFIED WHETHER SERIOUS COMORBIDITY PRESENT (HCC): ICD-10-CM

## 2024-05-30 RX ORDER — TOPIRAMATE 50 MG/1
100 TABLET, FILM COATED ORAL NIGHTLY
Qty: 180 TABLET | Refills: 1 | Status: SHIPPED | OUTPATIENT
Start: 2024-05-30

## 2024-05-30 RX ORDER — PHENTERMINE HYDROCHLORIDE 37.5 MG/1
37.5 TABLET ORAL
Qty: 30 TABLET | Refills: 1 | Status: SHIPPED | OUTPATIENT
Start: 2024-05-30 | End: 2024-07-29

## 2024-07-08 ENCOUNTER — OFFICE VISIT (OUTPATIENT)
Dept: FAMILY MEDICINE CLINIC | Age: 49
End: 2024-07-08
Payer: COMMERCIAL

## 2024-07-08 VITALS
DIASTOLIC BLOOD PRESSURE: 78 MMHG | HEIGHT: 63 IN | TEMPERATURE: 97.3 F | BODY MASS INDEX: 41.11 KG/M2 | OXYGEN SATURATION: 98 % | HEART RATE: 86 BPM | SYSTOLIC BLOOD PRESSURE: 126 MMHG | WEIGHT: 232 LBS

## 2024-07-08 DIAGNOSIS — R73.03 PREDIABETES: ICD-10-CM

## 2024-07-08 DIAGNOSIS — Z12.31 BREAST CANCER SCREENING BY MAMMOGRAM: ICD-10-CM

## 2024-07-08 DIAGNOSIS — E66.01 CLASS 3 SEVERE OBESITY DUE TO EXCESS CALORIES WITH BODY MASS INDEX (BMI) OF 40.0 TO 44.9 IN ADULT, UNSPECIFIED WHETHER SERIOUS COMORBIDITY PRESENT (HCC): Primary | ICD-10-CM

## 2024-07-08 DIAGNOSIS — J45.20 MILD INTERMITTENT ASTHMA WITHOUT COMPLICATION: ICD-10-CM

## 2024-07-08 DIAGNOSIS — K21.9 GASTROESOPHAGEAL REFLUX DISEASE WITHOUT ESOPHAGITIS: ICD-10-CM

## 2024-07-08 PROCEDURE — 99214 OFFICE O/P EST MOD 30 MIN: CPT | Performed by: FAMILY MEDICINE

## 2024-07-08 PROCEDURE — G8417 CALC BMI ABV UP PARAM F/U: HCPCS | Performed by: FAMILY MEDICINE

## 2024-07-08 PROCEDURE — 4004F PT TOBACCO SCREEN RCVD TLK: CPT | Performed by: FAMILY MEDICINE

## 2024-07-08 PROCEDURE — G8427 DOCREV CUR MEDS BY ELIG CLIN: HCPCS | Performed by: FAMILY MEDICINE

## 2024-07-08 RX ORDER — PHENTERMINE HYDROCHLORIDE 37.5 MG/1
37.5 TABLET ORAL
Qty: 30 TABLET | Refills: 1 | Status: SHIPPED | OUTPATIENT
Start: 2024-07-08 | End: 2024-09-06

## 2024-07-08 RX ORDER — OMEPRAZOLE 40 MG/1
40 CAPSULE, DELAYED RELEASE ORAL
Qty: 90 CAPSULE | Refills: 3 | Status: SHIPPED | OUTPATIENT
Start: 2024-07-08

## 2024-07-08 SDOH — ECONOMIC STABILITY: FOOD INSECURITY: WITHIN THE PAST 12 MONTHS, YOU WORRIED THAT YOUR FOOD WOULD RUN OUT BEFORE YOU GOT MONEY TO BUY MORE.: NEVER TRUE

## 2024-07-08 SDOH — ECONOMIC STABILITY: FOOD INSECURITY: WITHIN THE PAST 12 MONTHS, THE FOOD YOU BOUGHT JUST DIDN'T LAST AND YOU DIDN'T HAVE MONEY TO GET MORE.: NEVER TRUE

## 2024-07-08 SDOH — ECONOMIC STABILITY: INCOME INSECURITY: HOW HARD IS IT FOR YOU TO PAY FOR THE VERY BASICS LIKE FOOD, HOUSING, MEDICAL CARE, AND HEATING?: NOT HARD AT ALL

## 2024-07-08 NOTE — PROGRESS NOTES
transfusion,anemia  DERMATOLOGY: admits to rash on her upper extremities   PSYCHIATRY: Denies depression, agitation or anxiety.    No past medical history on file.     Past Surgical History:   Procedure Laterality Date     SECTION      x4 emergency  c- section for the first one.    AR DENTAL SURGERY PROCEDURE Left 2018    REMOVAL OF TOOTH #18 WITH IRRIGARION OF ABSCESS performed by Verena Jones DDS at [facility]    AR DENTAL SURGERY PROCEDURE N/A 07/15/2018    DENTAL EXTRACTION performed by Verena Jones DDS at [facility] OR        Family History   Problem Relation Age of Onset    No Known Problems Father         Social History     Socioeconomic History    Marital status: Single     Spouse name: Not on file    Number of children: Not on file    Years of education: Not on file    Highest education level: Not on file   Occupational History    Not on file   Tobacco Use    Smoking status: Never    Smokeless tobacco: Never   Vaping Use    Vaping Use: Never used   Substance and Sexual Activity    Alcohol use: No    Drug use: No    Sexual activity: Not Currently   Other Topics Concern    Not on file   Social History Narrative    Born in [place]     Has 4 children     Does not work .     Social Determinants of Health     Financial Resource Strain: Low Risk  (2024)    Overall Financial Resource Strain (CARDIA)     Difficulty of Paying Living Expenses: Not hard at all   Food Insecurity: No Food Insecurity (2024)    Hunger Vital Sign     Worried About Running Out of Food in the Last Year: Never true     Ran Out of Food in the Last Year: Never true   Transportation Needs: Unknown (2024)    PRAPARE - Transportation     Lack of Transportation (Medical): Not on file     Lack of Transportation (Non-Medical): No   Physical Activity: Not on file   Stress: Not on file   Social Connections: Not on file   Intimate Partner Violence: Not on file   Housing Stability: Unknown (2024)    Housing Stability

## 2024-07-09 LAB
ESTIMATED AVERAGE GLUCOSE: 128 MG/DL
HBA1C MFR BLD: 6.1 % (ref 4–6)

## 2024-09-09 ENCOUNTER — OFFICE VISIT (OUTPATIENT)
Dept: FAMILY MEDICINE CLINIC | Age: 49
End: 2024-09-09
Payer: COMMERCIAL

## 2024-09-09 VITALS
BODY MASS INDEX: 42.35 KG/M2 | DIASTOLIC BLOOD PRESSURE: 84 MMHG | OXYGEN SATURATION: 96 % | HEART RATE: 91 BPM | TEMPERATURE: 97.4 F | HEIGHT: 63 IN | WEIGHT: 239 LBS | SYSTOLIC BLOOD PRESSURE: 126 MMHG

## 2024-09-09 DIAGNOSIS — E66.01 CLASS 3 SEVERE OBESITY DUE TO EXCESS CALORIES WITH BODY MASS INDEX (BMI) OF 40.0 TO 44.9 IN ADULT, UNSPECIFIED WHETHER SERIOUS COMORBIDITY PRESENT (HCC): ICD-10-CM

## 2024-09-09 DIAGNOSIS — R73.03 PREDIABETES: ICD-10-CM

## 2024-09-09 DIAGNOSIS — K21.9 GASTROESOPHAGEAL REFLUX DISEASE WITHOUT ESOPHAGITIS: Primary | ICD-10-CM

## 2024-09-09 PROCEDURE — G8427 DOCREV CUR MEDS BY ELIG CLIN: HCPCS | Performed by: FAMILY MEDICINE

## 2024-09-09 PROCEDURE — G8417 CALC BMI ABV UP PARAM F/U: HCPCS | Performed by: FAMILY MEDICINE

## 2024-09-09 PROCEDURE — 4004F PT TOBACCO SCREEN RCVD TLK: CPT | Performed by: FAMILY MEDICINE

## 2024-09-09 PROCEDURE — 99214 OFFICE O/P EST MOD 30 MIN: CPT | Performed by: FAMILY MEDICINE

## 2024-09-09 RX ORDER — OMEPRAZOLE 40 MG/1
40 CAPSULE, DELAYED RELEASE ORAL
Qty: 90 CAPSULE | Refills: 3 | Status: SHIPPED | OUTPATIENT
Start: 2024-09-09

## 2024-09-09 RX ORDER — PHENTERMINE HYDROCHLORIDE 37.5 MG/1
37.5 TABLET ORAL
Qty: 30 TABLET | Refills: 2 | Status: SHIPPED | OUTPATIENT
Start: 2024-09-09 | End: 2024-12-09

## 2024-09-09 RX ORDER — PHENTERMINE HYDROCHLORIDE 37.5 MG/1
37.5 TABLET ORAL
COMMUNITY
Start: 2024-08-15 | End: 2024-09-09 | Stop reason: SDUPTHER

## 2024-11-05 ENCOUNTER — HOSPITAL ENCOUNTER (OUTPATIENT)
Dept: WOMENS IMAGING | Age: 49
Discharge: HOME | End: 2024-11-07
Attending: FAMILY MEDICINE
Payer: COMMERCIAL

## 2024-11-05 DIAGNOSIS — Z12.31 BREAST CANCER SCREENING BY MAMMOGRAM: ICD-10-CM

## 2024-11-05 PROCEDURE — 77063 BREAST TOMOSYNTHESIS BI: CPT

## 2024-11-13 ENCOUNTER — TELEPHONE (OUTPATIENT)
Dept: FAMILY MEDICINE CLINIC | Age: 49
End: 2024-11-13

## 2024-11-13 NOTE — TELEPHONE ENCOUNTER
Patient called about the script for Trulicity. She wants to know if she can get the next dose up of the medication.      Please advise, thank you.

## 2024-11-14 DIAGNOSIS — E66.813 CLASS 3 SEVERE OBESITY DUE TO EXCESS CALORIES WITH BODY MASS INDEX (BMI) OF 40.0 TO 44.9 IN ADULT, UNSPECIFIED WHETHER SERIOUS COMORBIDITY PRESENT: Primary | ICD-10-CM

## 2024-11-14 DIAGNOSIS — E66.01 CLASS 3 SEVERE OBESITY DUE TO EXCESS CALORIES WITH BODY MASS INDEX (BMI) OF 40.0 TO 44.9 IN ADULT, UNSPECIFIED WHETHER SERIOUS COMORBIDITY PRESENT: Primary | ICD-10-CM

## 2024-11-14 DIAGNOSIS — R73.03 PREDIABETES: ICD-10-CM

## 2024-11-14 RX ORDER — DULAGLUTIDE 1.5 MG/.5ML
1.5 INJECTION, SOLUTION SUBCUTANEOUS WEEKLY
Qty: 2 ML | Refills: 0 | Status: SHIPPED | OUTPATIENT
Start: 2024-11-14

## 2024-12-04 DIAGNOSIS — R73.03 PREDIABETES: ICD-10-CM

## 2024-12-04 DIAGNOSIS — E66.01 CLASS 3 SEVERE OBESITY DUE TO EXCESS CALORIES WITH BODY MASS INDEX (BMI) OF 40.0 TO 44.9 IN ADULT, UNSPECIFIED WHETHER SERIOUS COMORBIDITY PRESENT: ICD-10-CM

## 2024-12-04 DIAGNOSIS — E66.813 CLASS 3 SEVERE OBESITY DUE TO EXCESS CALORIES WITH BODY MASS INDEX (BMI) OF 40.0 TO 44.9 IN ADULT, UNSPECIFIED WHETHER SERIOUS COMORBIDITY PRESENT: ICD-10-CM

## 2024-12-05 RX ORDER — DULAGLUTIDE 1.5 MG/.5ML
INJECTION, SOLUTION SUBCUTANEOUS
Qty: 2 ML | Refills: 0 | Status: SHIPPED | OUTPATIENT
Start: 2024-12-05

## 2024-12-09 ENCOUNTER — OFFICE VISIT (OUTPATIENT)
Dept: FAMILY MEDICINE CLINIC | Age: 49
End: 2024-12-09
Payer: COMMERCIAL

## 2024-12-09 VITALS
SYSTOLIC BLOOD PRESSURE: 126 MMHG | HEIGHT: 63 IN | TEMPERATURE: 97.1 F | OXYGEN SATURATION: 94 % | HEART RATE: 76 BPM | BODY MASS INDEX: 40.04 KG/M2 | WEIGHT: 226 LBS | DIASTOLIC BLOOD PRESSURE: 80 MMHG

## 2024-12-09 DIAGNOSIS — J45.20 MILD INTERMITTENT ASTHMA WITHOUT COMPLICATION: ICD-10-CM

## 2024-12-09 DIAGNOSIS — R73.03 PREDIABETES: ICD-10-CM

## 2024-12-09 DIAGNOSIS — R73.03 PREDIABETES: Primary | ICD-10-CM

## 2024-12-09 DIAGNOSIS — E66.813 CLASS 3 SEVERE OBESITY DUE TO EXCESS CALORIES WITH BODY MASS INDEX (BMI) OF 40.0 TO 44.9 IN ADULT, UNSPECIFIED WHETHER SERIOUS COMORBIDITY PRESENT: ICD-10-CM

## 2024-12-09 DIAGNOSIS — E66.01 CLASS 3 SEVERE OBESITY DUE TO EXCESS CALORIES WITH BODY MASS INDEX (BMI) OF 40.0 TO 44.9 IN ADULT, UNSPECIFIED WHETHER SERIOUS COMORBIDITY PRESENT: ICD-10-CM

## 2024-12-09 DIAGNOSIS — K59.03 DRUG INDUCED CONSTIPATION: ICD-10-CM

## 2024-12-09 DIAGNOSIS — K21.9 GASTROESOPHAGEAL REFLUX DISEASE WITHOUT ESOPHAGITIS: ICD-10-CM

## 2024-12-09 PROCEDURE — 1036F TOBACCO NON-USER: CPT | Performed by: FAMILY MEDICINE

## 2024-12-09 PROCEDURE — G8484 FLU IMMUNIZE NO ADMIN: HCPCS | Performed by: FAMILY MEDICINE

## 2024-12-09 PROCEDURE — G8417 CALC BMI ABV UP PARAM F/U: HCPCS | Performed by: FAMILY MEDICINE

## 2024-12-09 PROCEDURE — 99214 OFFICE O/P EST MOD 30 MIN: CPT | Performed by: FAMILY MEDICINE

## 2024-12-09 PROCEDURE — G8427 DOCREV CUR MEDS BY ELIG CLIN: HCPCS | Performed by: FAMILY MEDICINE

## 2024-12-09 RX ORDER — PHENTERMINE HYDROCHLORIDE 37.5 MG/1
37.5 TABLET ORAL
Qty: 30 TABLET | Refills: 2 | Status: SHIPPED | OUTPATIENT
Start: 2024-12-09 | End: 2025-03-10

## 2024-12-09 RX ORDER — DULAGLUTIDE 1.5 MG/.5ML
1.5 INJECTION, SOLUTION SUBCUTANEOUS WEEKLY
Qty: 2 ML | Refills: 2 | Status: SHIPPED | OUTPATIENT
Start: 2024-12-09

## 2024-12-09 RX ORDER — DOCUSATE SODIUM 100 MG/1
100 CAPSULE, LIQUID FILLED ORAL 2 TIMES DAILY
Qty: 60 CAPSULE | Refills: 2 | Status: SHIPPED | OUTPATIENT
Start: 2024-12-09 | End: 2025-03-09

## 2024-12-09 RX ORDER — ALBUTEROL SULFATE 90 UG/1
2 INHALANT RESPIRATORY (INHALATION) EVERY 6 HOURS PRN
Qty: 18 G | Refills: 3 | Status: SHIPPED | OUTPATIENT
Start: 2024-12-09

## 2024-12-09 RX ORDER — NAPROXEN 250 MG/1
250 TABLET ORAL EVERY 12 HOURS PRN
Qty: 120 TABLET | Refills: 0 | Status: SHIPPED | OUTPATIENT
Start: 2024-12-09

## 2024-12-09 NOTE — PROGRESS NOTES
obesity due to excess calories with body mass index (BMI) of 40.0 to 44.9 in adult, unspecified whether serious comorbidity present    - dulaglutide (TRULICITY) 1.5 MG/0.5ML SC injection; Inject 0.5 mLs into the skin once a week  Dispense: 2 mL; Refill: 2  - phentermine (ADIPEX-P) 37.5 MG tablet; Take 1 tablet by mouth every morning (before breakfast) for 91 days. Max Daily Amount: 37.5 mg  Dispense: 30 tablet; Refill: 2    3. Gastroesophageal reflux disease without esophagitis    - phentermine (ADIPEX-P) 37.5 MG tablet; Take 1 tablet by mouth every morning (before breakfast) for 91 days. Max Daily Amount: 37.5 mg  Dispense: 30 tablet; Refill: 2    4. Mild intermittent asthma without complication    - albuterol sulfate HFA (PROVENTIL HFA) 108 (90 Base) MCG/ACT inhaler; Inhale 2 puffs into the lungs every 6 hours as needed for Wheezing  Dispense: 18 g; Refill: 3    5. Drug induced constipation    - docusate sodium (COLACE) 100 MG capsule; Take 1 capsule by mouth 2 times daily  Dispense: 60 capsule; Refill: 2                          Please note, this report has been partially produced using speech recognition software  and may cause  and /or contain errors related to that system including grammar, punctuation and spelling as well as words and phrases that may seem inappropriate. If there are questions or concerns please feel free to contact me to clarify.

## 2024-12-10 LAB
ESTIMATED AVERAGE GLUCOSE: 114 MG/DL
HBA1C MFR BLD: 5.6 % (ref 4–6)

## 2025-03-14 DIAGNOSIS — K59.03 DRUG INDUCED CONSTIPATION: ICD-10-CM

## 2025-03-14 DIAGNOSIS — R73.03 PREDIABETES: ICD-10-CM

## 2025-03-14 DIAGNOSIS — E66.01 CLASS 3 SEVERE OBESITY DUE TO EXCESS CALORIES WITH BODY MASS INDEX (BMI) OF 40.0 TO 44.9 IN ADULT, UNSPECIFIED WHETHER SERIOUS COMORBIDITY PRESENT: ICD-10-CM

## 2025-03-14 DIAGNOSIS — K21.9 GASTROESOPHAGEAL REFLUX DISEASE WITHOUT ESOPHAGITIS: ICD-10-CM

## 2025-03-14 DIAGNOSIS — E66.813 CLASS 3 SEVERE OBESITY DUE TO EXCESS CALORIES WITH BODY MASS INDEX (BMI) OF 40.0 TO 44.9 IN ADULT, UNSPECIFIED WHETHER SERIOUS COMORBIDITY PRESENT: ICD-10-CM

## 2025-03-17 RX ORDER — PHENTERMINE HYDROCHLORIDE 37.5 MG/1
TABLET ORAL
Qty: 30 TABLET | Refills: 0 | Status: SHIPPED | OUTPATIENT
Start: 2025-03-17 | End: 2025-04-06

## 2025-03-17 RX ORDER — NAPROXEN 250 MG/1
250 TABLET ORAL EVERY 12 HOURS PRN
Qty: 120 TABLET | Refills: 0 | Status: SHIPPED | OUTPATIENT
Start: 2025-03-17

## 2025-03-17 RX ORDER — DOCUSATE SODIUM 100 MG/1
100 CAPSULE, LIQUID FILLED ORAL 2 TIMES DAILY
Qty: 60 CAPSULE | Refills: 0 | Status: SHIPPED | OUTPATIENT
Start: 2025-03-17

## 2025-03-17 NOTE — TELEPHONE ENCOUNTER
requesting medication refill. Please approve or deny this request.    Rx requested:  Requested Prescriptions     Pending Prescriptions Disp Refills    naproxen (NAPROSYN) 250 MG tablet [Pharmacy Med Name: Naproxen Oral Tablet 250 MG] 120 tablet 0     Sig: Take 1 tablet by mouth every 12 hours as needed for Pain (headache)    docusate sodium (COLACE) 100 MG capsule [Pharmacy Med Name: Docusate Sodium Oral Capsule 100 MG] 60 capsule 0     Sig: TAKE 1 CAPSULE BY MOUTH 2 TIMES A DAY    phentermine (ADIPEX-P) 37.5 MG tablet [Pharmacy Med Name: Phentermine HCl Oral Tablet 37.5 MG] 30 tablet 0     Sig: Take 1 tablet by mouth every morning (before breakfast). Max Daily Amount: 37.5 mg         Last Office Visit:   12/9/2024      Next Visit Date:  Future Appointments   Date Time Provider Department Center   3/26/2025  8:45 AM Michaela Blandon MD [facility] DE[facility]

## 2025-03-26 ENCOUNTER — OFFICE VISIT (OUTPATIENT)
Age: 50
End: 2025-03-26
Payer: COMMERCIAL

## 2025-03-26 VITALS
WEIGHT: 231 LBS | HEART RATE: 96 BPM | HEIGHT: 63 IN | SYSTOLIC BLOOD PRESSURE: 112 MMHG | TEMPERATURE: 97 F | DIASTOLIC BLOOD PRESSURE: 82 MMHG | OXYGEN SATURATION: 95 % | BODY MASS INDEX: 40.93 KG/M2

## 2025-03-26 DIAGNOSIS — E66.813 CLASS 3 SEVERE OBESITY DUE TO EXCESS CALORIES WITH BODY MASS INDEX (BMI) OF 40.0 TO 44.9 IN ADULT, UNSPECIFIED WHETHER SERIOUS COMORBIDITY PRESENT: ICD-10-CM

## 2025-03-26 DIAGNOSIS — K21.9 GASTROESOPHAGEAL REFLUX DISEASE WITHOUT ESOPHAGITIS: ICD-10-CM

## 2025-03-26 DIAGNOSIS — B96.89 ACUTE BACTERIAL SINUSITIS: Primary | ICD-10-CM

## 2025-03-26 DIAGNOSIS — R73.03 PREDIABETES: ICD-10-CM

## 2025-03-26 DIAGNOSIS — J45.20 MILD INTERMITTENT ASTHMA WITHOUT COMPLICATION: ICD-10-CM

## 2025-03-26 DIAGNOSIS — J01.90 ACUTE BACTERIAL SINUSITIS: Primary | ICD-10-CM

## 2025-03-26 DIAGNOSIS — E66.01 CLASS 3 SEVERE OBESITY DUE TO EXCESS CALORIES WITH BODY MASS INDEX (BMI) OF 40.0 TO 44.9 IN ADULT, UNSPECIFIED WHETHER SERIOUS COMORBIDITY PRESENT: ICD-10-CM

## 2025-03-26 PROCEDURE — G8417 CALC BMI ABV UP PARAM F/U: HCPCS | Performed by: FAMILY MEDICINE

## 2025-03-26 PROCEDURE — 99213 OFFICE O/P EST LOW 20 MIN: CPT | Performed by: FAMILY MEDICINE

## 2025-03-26 PROCEDURE — 99214 OFFICE O/P EST MOD 30 MIN: CPT | Performed by: FAMILY MEDICINE

## 2025-03-26 PROCEDURE — 1036F TOBACCO NON-USER: CPT | Performed by: FAMILY MEDICINE

## 2025-03-26 PROCEDURE — G8427 DOCREV CUR MEDS BY ELIG CLIN: HCPCS | Performed by: FAMILY MEDICINE

## 2025-03-26 RX ORDER — GUAIFENESIN 600 MG/1
1200 TABLET, EXTENDED RELEASE ORAL 2 TIMES DAILY
Qty: 40 TABLET | Refills: 0 | Status: SHIPPED | OUTPATIENT
Start: 2025-03-26 | End: 2025-04-05

## 2025-03-26 RX ORDER — PHENTERMINE HYDROCHLORIDE 37.5 MG/1
37.5 TABLET ORAL
Qty: 30 TABLET | Refills: 2 | Status: SHIPPED | OUTPATIENT
Start: 2025-03-26 | End: 2025-06-24

## 2025-03-26 RX ORDER — TOPIRAMATE 50 MG/1
50 TABLET, FILM COATED ORAL DAILY
Qty: 30 TABLET | Refills: 2 | Status: SHIPPED | OUTPATIENT
Start: 2025-03-26

## 2025-03-26 SDOH — ECONOMIC STABILITY: FOOD INSECURITY: WITHIN THE PAST 12 MONTHS, THE FOOD YOU BOUGHT JUST DIDN'T LAST AND YOU DIDN'T HAVE MONEY TO GET MORE.: NEVER TRUE

## 2025-03-26 SDOH — ECONOMIC STABILITY: INCOME INSECURITY: IN THE LAST 12 MONTHS, WAS THERE A TIME WHEN YOU WERE NOT ABLE TO PAY THE MORTGAGE OR RENT ON TIME?: NO

## 2025-03-26 SDOH — ECONOMIC STABILITY: FOOD INSECURITY: WITHIN THE PAST 12 MONTHS, YOU WORRIED THAT YOUR FOOD WOULD RUN OUT BEFORE YOU GOT MONEY TO BUY MORE.: NEVER TRUE

## 2025-03-26 SDOH — ECONOMIC STABILITY: TRANSPORTATION INSECURITY
IN THE PAST 12 MONTHS, HAS LACK OF TRANSPORTATION KEPT YOU FROM MEETINGS, WORK, OR FROM GETTING THINGS NEEDED FOR DAILY LIVING?: NO

## 2025-03-26 SDOH — ECONOMIC STABILITY: TRANSPORTATION INSECURITY
IN THE PAST 12 MONTHS, HAS THE LACK OF TRANSPORTATION KEPT YOU FROM MEDICAL APPOINTMENTS OR FROM GETTING MEDICATIONS?: NO

## 2025-03-26 ASSESSMENT — PATIENT HEALTH QUESTIONNAIRE - PHQ9
SUM OF ALL RESPONSES TO PHQ QUESTIONS 1-9: 0
2. FEELING DOWN, DEPRESSED OR HOPELESS: NOT AT ALL
SUM OF ALL RESPONSES TO PHQ QUESTIONS 1-9: 0
1. LITTLE INTEREST OR PLEASURE IN DOING THINGS: NOT AT ALL
1. LITTLE INTEREST OR PLEASURE IN DOING THINGS: NOT AT ALL
SUM OF ALL RESPONSES TO PHQ9 QUESTIONS 1 & 2: 0
SUM OF ALL RESPONSES TO PHQ QUESTIONS 1-9: 0
2. FEELING DOWN, DEPRESSED OR HOPELESS: NOT AT ALL
SUM OF ALL RESPONSES TO PHQ QUESTIONS 1-9: 0

## 2025-03-26 NOTE — PROGRESS NOTES
Chief Complaint   Patient presents with    3 Month Follow-Up     Patient had no issues or concerns.        HPI: Rashad Nicholson 49 y.o. female presenting for    History of Present Illness  The patient presents for evaluation of weight management, sinus congestion, and asthma. She is accompanied by her daughter.    Obesity   Has been gaining weight   Tried weight loss fads with no success   Would like medication to help lose weight     F/u     A cessation in weight loss progress is reported, attributed to fluctuations in appetite. Currently, she is on Trulicity 1.5 mg and Adipex. She previously tried Topamax but discontinued its use due to perceived ineffectiveness. She expresses a desire to resume Topamax, suspecting that late administration may have contributed to its lack of efficacy. No drowsiness was recalled as a side effect of Topamax. A refill of her Trulicity prescription is requested, with an inquiry about increasing the dosage. Trulicity induces constipation, which is managed with stool softeners.    SInusitis  Sinus congestion has been present for approximately one week, characterized by difficulty in expectorating mucus. Sudafed was tried but proved ineffective. An observation of a gray sac-like structure in her ear using an otoscope is reported, and she questions its normalcy.    Asthma is well-controlled.          Current Outpatient Medications   Medication Sig Dispense Refill    phentermine (ADIPEX-P) 37.5 MG tablet Take 1 tablet by mouth every morning (before breakfast) for 90 days. Max Daily Amount: 37.5 mg 30 tablet 2    topiramate (TOPAMAX) 50 MG tablet Take 1 tablet by mouth daily 30 tablet 2    Dulaglutide 3 MG/0.5ML SOAJ Inject 3 mg into the skin every 7 days 2 mL 2    amoxicillin-clavulanate (AUGMENTIN) 875-125 MG per tablet Take 1 tablet by mouth 2 times daily for 10 days 20 tablet 0    guaiFENesin (MUCINEX) 600 MG extended release tablet Take 2 tablets by mouth 2 times daily for 10 days 40

## 2025-04-16 DIAGNOSIS — K59.03 DRUG INDUCED CONSTIPATION: ICD-10-CM

## 2025-04-16 RX ORDER — DOCUSATE SODIUM 100 MG/1
100 CAPSULE, LIQUID FILLED ORAL 2 TIMES DAILY
Qty: 60 CAPSULE | Refills: 5 | Status: SHIPPED | OUTPATIENT
Start: 2025-04-16

## 2025-06-20 DIAGNOSIS — E66.813 CLASS 3 SEVERE OBESITY DUE TO EXCESS CALORIES WITH BODY MASS INDEX (BMI) OF 40.0 TO 44.9 IN ADULT, UNSPECIFIED WHETHER SERIOUS COMORBIDITY PRESENT (HCC): ICD-10-CM

## 2025-06-20 DIAGNOSIS — R73.03 PREDIABETES: ICD-10-CM

## 2025-06-20 RX ORDER — DULAGLUTIDE 3 MG/.5ML
INJECTION, SOLUTION SUBCUTANEOUS
Qty: 2 ML | Refills: 0 | Status: ACTIVE | OUTPATIENT
Start: 2025-06-20

## 2025-07-01 ENCOUNTER — OFFICE VISIT (OUTPATIENT)
Age: 50
End: 2025-07-01
Payer: COMMERCIAL

## 2025-07-01 VITALS
BODY MASS INDEX: 36.68 KG/M2 | OXYGEN SATURATION: 99 % | HEIGHT: 63 IN | SYSTOLIC BLOOD PRESSURE: 122 MMHG | TEMPERATURE: 97.6 F | WEIGHT: 207 LBS | DIASTOLIC BLOOD PRESSURE: 80 MMHG | HEART RATE: 85 BPM

## 2025-07-01 DIAGNOSIS — G44.89 OTHER HEADACHE SYNDROME: ICD-10-CM

## 2025-07-01 DIAGNOSIS — R73.03 PREDIABETES: ICD-10-CM

## 2025-07-01 DIAGNOSIS — R74.8 ELEVATED LIVER ENZYMES: ICD-10-CM

## 2025-07-01 LAB
ALBUMIN SERPL-MCNC: 4.1 G/DL (ref 3.5–4.6)
ALP SERPL-CCNC: 95 U/L (ref 40–130)
ALT SERPL-CCNC: 7 U/L (ref 0–33)
AST SERPL-CCNC: 22 U/L (ref 0–35)
BILIRUB DIRECT SERPL-MCNC: <0.1 MG/DL (ref 0–0.4)
BILIRUB INDIRECT SERPL-MCNC: 0.2 MG/DL (ref 0–0.6)
BILIRUB SERPL-MCNC: 0.3 MG/DL (ref 0.2–0.7)
HBA1C MFR BLD: 5.5 %
PROT SERPL-MCNC: 7.7 G/DL (ref 6.3–8)
TSH REFLEX: 1.43 UIU/ML (ref 0.44–3.86)

## 2025-07-01 PROCEDURE — 1036F TOBACCO NON-USER: CPT | Performed by: PHYSICIAN ASSISTANT

## 2025-07-01 PROCEDURE — PBSHW POCT GLYCOSYLATED HEMOGLOBIN (HGB A1C): Performed by: PHYSICIAN ASSISTANT

## 2025-07-01 PROCEDURE — 99212 OFFICE O/P EST SF 10 MIN: CPT | Performed by: PHYSICIAN ASSISTANT

## 2025-07-01 PROCEDURE — G8417 CALC BMI ABV UP PARAM F/U: HCPCS | Performed by: PHYSICIAN ASSISTANT

## 2025-07-01 PROCEDURE — 83036 HEMOGLOBIN GLYCOSYLATED A1C: CPT | Performed by: PHYSICIAN ASSISTANT

## 2025-07-01 PROCEDURE — 3017F COLORECTAL CA SCREEN DOC REV: CPT | Performed by: PHYSICIAN ASSISTANT

## 2025-07-01 PROCEDURE — 99213 OFFICE O/P EST LOW 20 MIN: CPT | Performed by: PHYSICIAN ASSISTANT

## 2025-07-01 PROCEDURE — G8427 DOCREV CUR MEDS BY ELIG CLIN: HCPCS | Performed by: PHYSICIAN ASSISTANT

## 2025-07-01 RX ORDER — NAPROXEN 250 MG/1
250 TABLET ORAL 2 TIMES DAILY PRN
Qty: 60 TABLET | Refills: 1 | Status: SHIPPED | OUTPATIENT
Start: 2025-07-01

## 2025-07-01 SDOH — ECONOMIC STABILITY: FOOD INSECURITY: WITHIN THE PAST 12 MONTHS, YOU WORRIED THAT YOUR FOOD WOULD RUN OUT BEFORE YOU GOT MONEY TO BUY MORE.: NEVER TRUE

## 2025-07-01 SDOH — ECONOMIC STABILITY: FOOD INSECURITY: WITHIN THE PAST 12 MONTHS, THE FOOD YOU BOUGHT JUST DIDN'T LAST AND YOU DIDN'T HAVE MONEY TO GET MORE.: NEVER TRUE

## 2025-07-01 NOTE — PROGRESS NOTES
Subjective  Rashad Nicholson, 50 y.o. female presents today with:  Chief Complaint   Patient presents with    Weight Management     Patient presents today for 3 month f/up. Patient lost 29 lb. Last A1C 24 with results of 5.6       HPI  Patient is here for follow-up on weight management on combination of Trulicity Adipex and Topamax  Goal weight 170#s  She has lost a total of 29 pounds over the past 3 months  reports mild nausea on awakening in the morning -occasional headaches since starting Trulicity requesting refill of naproxen   Elevated liver enzymes on previous study- she states since starting Trulicity she has stopped drinking alcohol and has been limiting dietary fats  Review of Systems   All other systems reviewed and are negative.        No past medical history on file.  Past Surgical History:   Procedure Laterality Date     SECTION      x4 emergency  c- section for the first one.    ID UNLISTED PROCEDURE DENTOALVEOLAR STRUCTURES Left 2018    REMOVAL OF TOOTH #18 WITH IRRIGARION OF ABSCESS performed by Verena Jones DDS at [facility]    ID UNLISTED PROCEDURE DENTOALVEOLAR STRUCTURES N/A 07/15/2018    DENTAL EXTRACTION performed by Verena Jones DDS at [facility]     Social History     Socioeconomic History    Marital status: Single     Spouse name: Not on file    Number of children: Not on file    Years of education: Not on file    Highest education level: Not on file   Occupational History    Not on file   Tobacco Use    Smoking status: Never    Smokeless tobacco: Never   Vaping Use    Vaping status: Never Used   Substance and Sexual Activity    Alcohol use: No    Drug use: No    Sexual activity: Not Currently   Other Topics Concern    Not on file   Social History Narrative    Born in [place]     Has 4 children     Does not work .     Social Drivers of Health     Financial Resource Strain: Low Risk  (2024)    Overall Financial Resource Strain (CARDIA)     Difficulty of

## 2025-07-02 ENCOUNTER — RESULTS FOLLOW-UP (OUTPATIENT)
Age: 50
End: 2025-07-02

## 2025-07-19 DIAGNOSIS — R73.03 PREDIABETES: ICD-10-CM

## 2025-07-19 DIAGNOSIS — E66.813 CLASS 3 SEVERE OBESITY DUE TO EXCESS CALORIES WITH BODY MASS INDEX (BMI) OF 40.0 TO 44.9 IN ADULT, UNSPECIFIED WHETHER SERIOUS COMORBIDITY PRESENT (HCC): ICD-10-CM

## 2025-07-20 RX ORDER — DULAGLUTIDE 3 MG/.5ML
INJECTION, SOLUTION SUBCUTANEOUS
Qty: 2 ML | Refills: 0 | Status: SHIPPED | OUTPATIENT
Start: 2025-07-20

## 2025-07-28 ENCOUNTER — TELEPHONE (OUTPATIENT)
Age: 50
End: 2025-07-28

## 2025-07-28 DIAGNOSIS — E66.813 CLASS 3 SEVERE OBESITY DUE TO EXCESS CALORIES WITH BODY MASS INDEX (BMI) OF 40.0 TO 44.9 IN ADULT, UNSPECIFIED WHETHER SERIOUS COMORBIDITY PRESENT (HCC): Primary | ICD-10-CM

## 2025-07-28 RX ORDER — PHENTERMINE HYDROCHLORIDE 37.5 MG/1
37.5 TABLET ORAL
Qty: 30 TABLET | Refills: 0 | Status: SHIPPED | OUTPATIENT
Start: 2025-07-28 | End: 2025-08-27

## 2025-08-12 DIAGNOSIS — E66.813 CLASS 3 SEVERE OBESITY DUE TO EXCESS CALORIES WITH BODY MASS INDEX (BMI) OF 40.0 TO 44.9 IN ADULT, UNSPECIFIED WHETHER SERIOUS COMORBIDITY PRESENT (HCC): ICD-10-CM

## 2025-08-12 DIAGNOSIS — R73.03 PREDIABETES: ICD-10-CM

## 2025-08-12 RX ORDER — DULAGLUTIDE 3 MG/.5ML
INJECTION, SOLUTION SUBCUTANEOUS
Qty: 2 ML | Refills: 2 | Status: ACTIVE | OUTPATIENT
Start: 2025-08-12

## (undated) DEVICE — TUBING, SUCTION, 1/4" X 10', STRAIGHT: Brand: MEDLINE

## (undated) DEVICE — TOWEL,OR,DSP,ST,BLUE,STD,4/PK,20PK/CS: Brand: MEDLINE

## (undated) DEVICE — GAUZE,SPONGE,4"X4",16PLY,XRAY,STRL,LF: Brand: MEDLINE

## (undated) DEVICE — PACK,SET UP,DRAPE: Brand: MEDLINE

## (undated) DEVICE — NEEDLE HYPO 22GA L1 1/2IN PIVOTING SHLD FOR LUERLOCK SYR

## (undated) DEVICE — SYRINGE IRRIG 60ML SFT PLIABLE BLB EZ TO GRP 1 HND USE W/

## (undated) DEVICE — INTENDED FOR TISSUE SEPARATION, AND OTHER PROCEDURES THAT REQUIRE A SHARP SURGICAL BLADE TO PUNCTURE OR CUT.: Brand: BARD-PARKER ® CARBON RIB-BACK BLADES

## (undated) DEVICE — GOWN,AURORA,NONREINFORCED,LARGE: Brand: MEDLINE

## (undated) DEVICE — Device

## (undated) DEVICE — SYRINGE MED 10ML TRNSLUC BRL PLUNG BLK MRK POLYPR CTRL

## (undated) DEVICE — ASTOUND STANDARD SURGICAL GOWN, XXL: Brand: CONVERTORS

## (undated) DEVICE — GLOVE SURG SZ 85 STD WHT LTX SYN POLYMER BEAD REINF ANTI RL

## (undated) DEVICE — BUR SURG 4X8MM STD CARB OVL

## (undated) DEVICE — COUNTER NDL 40 COUNT HLD 70 FOAM BLK ADH W/ MAG

## (undated) DEVICE — LABEL MED MINI W/ MARKER

## (undated) DEVICE — SUTURE CHROMIC GUT SZ 2-0 L27IN ABSRB BRN L26MM SH 1/2 CIR G123H